# Patient Record
Sex: FEMALE | Race: WHITE | NOT HISPANIC OR LATINO | Employment: OTHER | URBAN - METROPOLITAN AREA
[De-identification: names, ages, dates, MRNs, and addresses within clinical notes are randomized per-mention and may not be internally consistent; named-entity substitution may affect disease eponyms.]

---

## 2017-01-13 ENCOUNTER — TELEPHONE (OUTPATIENT)
Dept: INTERNAL MEDICINE | Facility: CLINIC | Age: 82
End: 2017-01-13

## 2017-01-13 NOTE — TELEPHONE ENCOUNTER
----- Message from Kimberly Avalos sent at 1/13/2017  9:01 AM CST -----  Contact: Anca/daughter   [phone]  Pt daughter states she need to be seen today.  Pt went to the ER on 1/4/2017 and was told if she didn't get better to see her PCP    Please advise

## 2017-01-17 ENCOUNTER — OFFICE VISIT (OUTPATIENT)
Dept: INTERNAL MEDICINE | Facility: CLINIC | Age: 82
End: 2017-01-17
Payer: MEDICARE

## 2017-01-17 VITALS
HEART RATE: 82 BPM | TEMPERATURE: 98 F | BODY MASS INDEX: 27.02 KG/M2 | WEIGHT: 147.69 LBS | SYSTOLIC BLOOD PRESSURE: 168 MMHG | DIASTOLIC BLOOD PRESSURE: 88 MMHG | OXYGEN SATURATION: 97 %

## 2017-01-17 DIAGNOSIS — R51.9 CHRONIC NONINTRACTABLE HEADACHE, UNSPECIFIED HEADACHE TYPE: ICD-10-CM

## 2017-01-17 DIAGNOSIS — G89.29 CHRONIC NONINTRACTABLE HEADACHE, UNSPECIFIED HEADACHE TYPE: ICD-10-CM

## 2017-01-17 DIAGNOSIS — F41.0 PANIC DISORDER: Primary | ICD-10-CM

## 2017-01-17 PROCEDURE — 99999 PR PBB SHADOW E&M-EST. PATIENT-LVL IV: CPT | Mod: PBBFAC,,, | Performed by: INTERNAL MEDICINE

## 2017-01-17 PROCEDURE — 99499 UNLISTED E&M SERVICE: CPT | Mod: S$GLB,,, | Performed by: INTERNAL MEDICINE

## 2017-01-17 PROCEDURE — 1159F MED LIST DOCD IN RCRD: CPT | Mod: S$GLB,,, | Performed by: INTERNAL MEDICINE

## 2017-01-17 PROCEDURE — 99213 OFFICE O/P EST LOW 20 MIN: CPT | Mod: S$GLB,,, | Performed by: INTERNAL MEDICINE

## 2017-01-17 PROCEDURE — 1125F AMNT PAIN NOTED PAIN PRSNT: CPT | Mod: S$GLB,,, | Performed by: INTERNAL MEDICINE

## 2017-01-17 PROCEDURE — 1157F ADVNC CARE PLAN IN RCRD: CPT | Mod: S$GLB,,, | Performed by: INTERNAL MEDICINE

## 2017-01-17 PROCEDURE — 3077F SYST BP >= 140 MM HG: CPT | Mod: S$GLB,,, | Performed by: INTERNAL MEDICINE

## 2017-01-17 PROCEDURE — 1160F RVW MEDS BY RX/DR IN RCRD: CPT | Mod: S$GLB,,, | Performed by: INTERNAL MEDICINE

## 2017-01-17 PROCEDURE — 3079F DIAST BP 80-89 MM HG: CPT | Mod: S$GLB,,, | Performed by: INTERNAL MEDICINE

## 2017-01-17 RX ORDER — AMOXICILLIN AND CLAVULANATE POTASSIUM 875; 125 MG/1; MG/1
1 TABLET, FILM COATED ORAL 2 TIMES DAILY
Qty: 14 TABLET | Refills: 0 | Status: SHIPPED | OUTPATIENT
Start: 2017-01-17 | End: 2017-01-24

## 2017-01-17 RX ORDER — FLUTICASONE PROPIONATE 50 MCG
2 SPRAY, SUSPENSION (ML) NASAL DAILY PRN
Qty: 16 G | Refills: 12 | Status: SHIPPED | OUTPATIENT
Start: 2017-01-17 | End: 2017-02-16

## 2017-01-17 NOTE — MR AVS SNAPSHOT
[facility] Internal Medicine   [place] [address]  Phone: [phone]  Fax: [phone]                  Katherine Hernandez   2017 4:20 PM   Office Visit    Description:  Female : 1931   Provider:  Wilder Martinez MD   Department:  [facility] Medicine           Reason for Visit     Sinusitis     Headache                To Do List           Goals (5 Years of Data)     None       These Medications        Disp Refills Start End    fluticasone (FLONASE) 50 mcg/actuation nasal spray 16 g 12 2017    2 sprays by Each Nare route daily as needed (Use as directed for sinus pressure). - Each Nare    Pharmacy: [facility] [address] Ph #: [phone]       amoxicillin-clavulanate 875-125mg (AUGMENTIN) 875-125 mg per tablet 14 tablet 0 2017    Take 1 tablet by mouth 2 (two) times daily. - Oral    Pharmacy: [facility] [address] Ph #: [phone]         [facility]     [facility] Nurse [facility] -  Assistance  Registered nurses in the [facility] provide clinical advisement, health education, appointment booking, and other advisory services.  Call for this free service at [phone].             Medications           Message regarding Medications     Verify the changes and/or additions to your medication regime listed below are the same as discussed with your clinician today.  If any of these changes or additions are incorrect, please notify your healthcare provider.        START taking these NEW medications        Refills    fluticasone (FLONASE) 50 mcg/actuation nasal spray 12    Si sprays by Each Nare route daily as needed (Use as directed for sinus pressure).    Class: Print    Route: Each Nare    amoxicillin-clavulanate 875-125mg (AUGMENTIN) 875-125 mg per tablet 0    Sig: Take 1 tablet by mouth 2 (two) times daily.    Class: Print    Route:  Oral           Verify that the below list of medications is an accurate representation of the medications you are currently taking.  If none reported, the list may be blank. If incorrect, please contact your healthcare provider. Carry this list with you in case of emergency.           Current Medications     alprazolam (XANAX) 0.5 MG tablet Take 1 tablet (0.5 mg total) by mouth daily as needed for Anxiety (caution on sedative effects).    amlodipine (NORVASC) 10 MG tablet Take 5 mg by mouth once daily.     apixaban (ELIQUIS) 2.5 mg Tab Take 2.5 mg by mouth 2 (two) times daily.    CA CARB/D3/MAG OX//ANNA/ZN (CALTRATE + D3 PLUS MINERALS ORAL) Take by mouth.    furosemide (LASIX) 20 MG tablet Take 20 mg by mouth 2 (two) times daily.    gemfibrozil (LOPID) 600 MG tablet Take 600 mg by mouth. Take 1/2 tablet QHS    losartan (COZAAR) 50 MG tablet Take 50 mg by mouth 2 (two) times daily.    metoprolol tartrate (LOPRESSOR) 25 MG tablet Take 50 mg by mouth 2 (two) times daily.     omeprazole (PRILOSEC) 20 MG capsule Take 20 mg by mouth once daily.    oxycodone-acetaminophen (PERCOCET) 5-325 mg per tablet Take 1 tablet by mouth every 6 (six) hours as needed for Pain.    polycarbophil (FIBERCON) 625 mg tablet Take 625 mg by mouth once daily.    promethazine (PHENERGAN) 25 MG tablet Take 1 tablet (25 mg total) by mouth 3 (three) times daily as needed for Nausea.    rosuvastatin (CRESTOR) 10 MG tablet Take 10 mg by mouth every evening.    senna-docusate 8.6-50 mg (SENNA WITH DOCUSATE SODIUM) 8.6-50 mg per tablet Take 1 tablet by mouth once daily.    vitamins  A,C,E-zinc-copper (PRESERVISION AREDS) 7,160-113-100 unit-mg-unit Tab Take by mouth.    amoxicillin-clavulanate 875-125mg (AUGMENTIN) 875-125 mg per tablet Take 1 tablet by mouth 2 (two) times daily.    fluticasone (FLONASE) 50 mcg/actuation nasal spray 2 sprays by Each Nare route daily as needed (Use as directed for sinus pressure).           Clinical Reference  "Information           Vital Signs - Last Recorded  Most recent update: 1/17/2017  4:46 PM by Shyanne Crow LPN    BP Pulse Temp Wt SpO2 BMI    (!) 168/88 (BP Location: Right arm, Patient Position: Sitting, BP Method: Manual) 82 97.6 °F (36.4 °C) (Oral) 67 kg (147 lb 11.3 oz) 97% 27.02 kg/m2      Blood Pressure          Most Recent Value    BP  (!)  168/88      Allergies as of 1/17/2017     Ace Inhibitors    Diovan Hct [Valsartan-hydrochlorothiazide]    Fosinopril Sodium    Codeine    Niaspan Extended-release [Niacin]      Immunizations Administered on Date of Encounter - 1/17/2017     None      [facility] Sign-Up     Activating your [facility] account is as easy as 1-2-3!     1) Visit [email or web address], select Sign Up Now, enter this activation code and your date of birth, then select Next.  [email or web address]  Expires: 2/11/2017  8:41 AM      2) Create a username and password to use when you visit [facility] in the future and select a security question in case you lose your password and select Next.    3) Enter your e-mail address and click Sign Up!    Additional Information  If you have questions, please e-mail [email or web address] or call [phone] to talk to our [facility] staff. Remember, [facility] is NOT to be used for urgent needs. For medical emergencies, dial 911.         Instructions    Plan for the sinuses  · FIRST THING is to start with Flonase nasal spray. After doing this for about 7 days, if pressure has not gotten better, you can add a new antibiotic in addition to the Flonase.   · New antibiotic is Augmentin.       How to use your Flonase   - flonase is not a very good "as needed medicine" for nasal congestion  - it works best when you use it for a period of at least 7 days in A ROW.   - I want you to take this for 7 to 14 days in a row then take a 1 week break.   - After 1 week break, if congestion returns, you can start for another 14 days.   - Always take a 7 day break between uses so you don't " get side effects like nose bleed or sore throat.   - Gargle after each use of Flonase and spit out the water       Decongestants   - do not use daily, this can make your congestion worse over time   - for rapid relief of allergy congestion you can try something like Claritin-D with a decongestant. Afrin. Psuedophed. Phenylerphrine   - this is for rapid relief and should only be used as needed, less than 4 days per week.     NASAL SALINE   Washing the nasal cavities with saline reduces postnasal drainage, removes secretions, and rinses away allergens and irritants.     Saline washes can be used immediately prior to administration of other intranasal medications so that the mucosa is freshly cleansed when the medications are introduced    You can use this daily, or multiple times daily, depending on the severity of symptoms      Reasons to Call Clinic   · Start to have headache, facial pain, yellow mucous, fevers.

## 2017-01-17 NOTE — PATIENT INSTRUCTIONS
"Plan for the sinuses  · FIRST THING is to start with Flonase nasal spray. After doing this for about 7 days, if pressure has not gotten better, you can add a new antibiotic in addition to the Flonase.   · New antibiotic is Augmentin.       How to use your Flonase   - flonase is not a very good "as needed medicine" for nasal congestion  - it works best when you use it for a period of at least 7 days in A ROW.   - I want you to take this for 7 to 14 days in a row then take a 1 week break.   - After 1 week break, if congestion returns, you can start for another 14 days.   - Always take a 7 day break between uses so you don't get side effects like nose bleed or sore throat.   - Gargle after each use of Flonase and spit out the water       Decongestants   - do not use daily, this can make your congestion worse over time   - for rapid relief of allergy congestion you can try something like Claritin-D with a decongestant. Afrin. Psuedophed. Phenylerphrine   - this is for rapid relief and should only be used as needed, less than 4 days per week.     NASAL SALINE   Washing the nasal cavities with saline reduces postnasal drainage, removes secretions, and rinses away allergens and irritants.     Saline washes can be used immediately prior to administration of other intranasal medications so that the mucosa is freshly cleansed when the medications are introduced    You can use this daily, or multiple times daily, depending on the severity of symptoms      Reasons to Call Clinic   · Start to have headache, facial pain, yellow mucous, fevers.       "

## 2017-01-18 NOTE — PROGRESS NOTES
"Portions of this note are generated with voice recognition software. Typographical errors may exist.     SUBJECTIVE:    This is a/an 85 y.o. female here for primary care visit for  Chief Complaint   Patient presents with    Sinusitis    Headache     Sinus headache.  Patient states after completing doxycycline she had modest improvement in the symptoms but states that it has not resolved.  Patient denies constitutional symptoms.  Patient struggles to describe her symptoms in detail.  Patient becomes exasperated and states "I don't know how I feel."  Denies otalgia.  Denies any significant diarrheal symptoms after anti-biotic.  Self-care has been limited.  No use of intranasal steroids.  Episodic use of over-the-counter cough and sinus medication.  She denies significant cough.    Anxiousness.  Patient endorses panic attack symptoms relating to unresolved sinus congestion.  Patient states that she has alprazolam at home for management of her anxiety attacks.    Medications Reviewed and Updated    Past medical, family, and social histories were reviewed and updated.    Review of Systems negative unless noted otherwise in history of present illness-  General ROS: negative  Psychological ROS: negative  ENT ROS: negative  Allergy and Immunology ROS: negative  Hematological and Lymphatic ROS: negative  Respiratory ROS: negative    Allergic:    Review of patient's allergies indicates:   Allergen Reactions    Ace inhibitors     Diovan hct [valsartan-hydrochlorothiazide] Swelling    Fosinopril sodium Swelling    Codeine Rash    Niaspan extended-release [niacin] Rash       OBJECTIVE:  BP: (!) 168/88 Pulse: 82 Temp: 97.6 °F (36.4 °C)  Wt Readings from Last 3 Encounters:   01/17/17 67 kg (147 lb 11.3 oz)   01/04/17 54.4 kg (120 lb)   12/28/16 73 kg (161 lb)    Body mass index is 27.02 kg/(m^2).  Previous Blood Pressure Readings :   BP Readings from Last 3 Encounters:   01/17/17 (!) 168/88   01/04/17 (!) 150/83   12/28/16 " (!) 156/77     GEN: No apparent distress  HEENT: sclera non-icteric, conjunctiva clear, moderate sinus pressure on palpation bilateral maxillary sinuses.  Equal on both sides.  No significant edema of the nasal turbinates.  No polyposis.  CV: no peripheral edema  PULM: breathing non-labored no wheezing bilateral lung fields.  ABD: Obese, protuberant abdomen.  PSYCH: Moderate psychomotor agitation.  MSK: able to rise from chair without assistance  SKIN: normal skin turgor    Pertinent Labs Reviewed       ASSESSMENT/PLAN:    Chronic headache.  Clinical follow-up warranted.  Etiology likely multifactorial.  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  Chronic tension headache due to anxiety disorder also possible.  Recommend patient pursue a nasal corticosteroid.  Patient can pursue Augmentin therapy for augmentation as necessary.  If this fails the patient likely needs follow-up with PCP to broaden treatment approach.    Panic disorder.  Clinical follow-up warranted.  Recommend patient continue to use current medication.    No future appointments.    Wilder Martinez  1/17/2017  7:08 PM

## 2017-01-27 ENCOUNTER — OFFICE VISIT (OUTPATIENT)
Dept: INTERNAL MEDICINE | Facility: CLINIC | Age: 82
End: 2017-01-27
Payer: MEDICARE

## 2017-01-27 VITALS
HEIGHT: 62 IN | HEART RATE: 63 BPM | BODY MASS INDEX: 28.11 KG/M2 | SYSTOLIC BLOOD PRESSURE: 148 MMHG | WEIGHT: 152.75 LBS | DIASTOLIC BLOOD PRESSURE: 68 MMHG

## 2017-01-27 DIAGNOSIS — F32.A DEPRESSION, UNSPECIFIED DEPRESSION TYPE: ICD-10-CM

## 2017-01-27 DIAGNOSIS — J30.9 ALLERGIC RHINITIS, UNSPECIFIED ALLERGIC RHINITIS TRIGGER, UNSPECIFIED RHINITIS SEASONALITY: ICD-10-CM

## 2017-01-27 DIAGNOSIS — I50.42 CHRONIC COMBINED SYSTOLIC AND DIASTOLIC CONGESTIVE HEART FAILURE: ICD-10-CM

## 2017-01-27 DIAGNOSIS — I48.20 CHRONIC ATRIAL FIBRILLATION: ICD-10-CM

## 2017-01-27 DIAGNOSIS — I10 ESSENTIAL HYPERTENSION: Primary | ICD-10-CM

## 2017-01-27 DIAGNOSIS — K21.9 GASTROESOPHAGEAL REFLUX DISEASE, ESOPHAGITIS PRESENCE NOT SPECIFIED: ICD-10-CM

## 2017-01-27 DIAGNOSIS — F41.9 ANXIETY: ICD-10-CM

## 2017-01-27 PROCEDURE — 1159F MED LIST DOCD IN RCRD: CPT | Mod: S$GLB,,, | Performed by: INTERNAL MEDICINE

## 2017-01-27 PROCEDURE — 3077F SYST BP >= 140 MM HG: CPT | Mod: S$GLB,,, | Performed by: INTERNAL MEDICINE

## 2017-01-27 PROCEDURE — 1157F ADVNC CARE PLAN IN RCRD: CPT | Mod: S$GLB,,, | Performed by: INTERNAL MEDICINE

## 2017-01-27 PROCEDURE — 99214 OFFICE O/P EST MOD 30 MIN: CPT | Mod: S$GLB,,, | Performed by: INTERNAL MEDICINE

## 2017-01-27 PROCEDURE — 99999 PR PBB SHADOW E&M-EST. PATIENT-LVL III: CPT | Mod: PBBFAC,,, | Performed by: INTERNAL MEDICINE

## 2017-01-27 PROCEDURE — 3078F DIAST BP <80 MM HG: CPT | Mod: S$GLB,,, | Performed by: INTERNAL MEDICINE

## 2017-01-27 PROCEDURE — 99499 UNLISTED E&M SERVICE: CPT | Mod: S$GLB,,, | Performed by: INTERNAL MEDICINE

## 2017-01-27 PROCEDURE — 1126F AMNT PAIN NOTED NONE PRSNT: CPT | Mod: S$GLB,,, | Performed by: INTERNAL MEDICINE

## 2017-01-27 PROCEDURE — 1160F RVW MEDS BY RX/DR IN RCRD: CPT | Mod: S$GLB,,, | Performed by: INTERNAL MEDICINE

## 2017-01-27 RX ORDER — SERTRALINE HYDROCHLORIDE 50 MG/1
50 TABLET, FILM COATED ORAL DAILY
Qty: 30 TABLET | Refills: 1 | Status: SHIPPED | OUTPATIENT
Start: 2017-01-27 | End: 2017-03-03

## 2017-01-27 NOTE — PROGRESS NOTES
Subjective:       Patient ID: Katherine Hernandez is a 85 y.o. female.    Chief Complaint: Sinusitis and Gastroesophageal Reflux    HPI Pt. Here for f/u for allergies; pt. Daughter, Anca, is with the pt.; She has been using flonase and her symptoms are improving; I advised her to try OTC claritin prn; she reports occassional GERD and her daughter gave her prilosec this AM which helped; she is on xanax for anxiety and she feels she needs something for depression; she denies suicidal ideation; BP is borderline elevated; she sees cardiology for A-fib; she would prefer to have cardiology manage BP and does not want adjustment in BP meds at this time; she gained a few pounds  Review of Systems   Constitutional: Negative for chills, fatigue and fever.   HENT: Positive for postnasal drip and rhinorrhea. Negative for congestion and sore throat.         Flonase prn helps   Respiratory: Negative for cough, shortness of breath and wheezing.    Cardiovascular: Negative for chest pain.   Gastrointestinal: Negative for abdominal pain, nausea and vomiting.        GERD helped with prilosec prn   Genitourinary: Negative for dysuria.   Musculoskeletal: Negative for arthralgias.   Skin: Negative for rash.   Neurological: Negative for dizziness and headaches.   Psychiatric/Behavioral: Negative for sleep disturbance. The patient is nervous/anxious.         Anxiety and depression without suicidal ideation       Objective:      Physical Exam   Constitutional: She is oriented to person, place, and time.   overweight   Eyes: EOM are normal.   Neck: Normal range of motion.   Cardiovascular: Normal rate, regular rhythm and normal heart sounds.    Pulmonary/Chest: Effort normal and breath sounds normal.   Abdominal: Soft. There is no tenderness. There is no rebound and no guarding.   Musculoskeletal: Normal range of motion.   Neurological: She is alert and oriented to person, place, and time.   Skin: No rash noted.       Assessment:       1.  Essential hypertension Well controlled   2. Gastroesophageal reflux disease, esophagitis presence not specified Well controlled   3. Anxiety Active   4. Depression, unspecified depression type Active   5. Chronic atrial fibrillation Well controlled   6. Chronic combined systolic and diastolic congestive heart failure Well controlled   7. Allergic rhinitis, unspecified allergic rhinitis trigger, unspecified rhinitis seasonality Well controlled       Plan:         Essential hypertension  Comments:  based on age goal SBP <150; encouraged low Na diet and weight loss    Gastroesophageal reflux disease, esophagitis presence not specified  Comments:  continue prilosec prn     Anxiety  Comments:  continue xanax prn and cautioned on sedative effects; monitor on zoloft  Orders:  -     sertraline (ZOLOFT) 50 MG tablet; Take 1 tablet (50 mg total) by mouth once daily.  Dispense: 30 tablet; Refill: 1    Depression, unspecified depression type  Comments:  start zoloft and re-evaluate in 1 month  Orders:  -     sertraline (ZOLOFT) 50 MG tablet; Take 1 tablet (50 mg total) by mouth once daily.  Dispense: 30 tablet; Refill: 1    Chronic atrial fibrillation  Comments:  continue current regimen and f/u cardiology who is managing    Chronic combined systolic and diastolic congestive heart failure  Comments:  continue current regimen and f/u cardiology who is managing    Allergic rhinitis, unspecified allergic rhinitis trigger, unspecified rhinitis seasonality  Comments:  continue flonase prn and asked pt. to try claritin prn

## 2017-01-27 NOTE — MR AVS SNAPSHOT
[facility] Internal Medicine   [place]  [place] [address]  Phone: [phone]  Fax: [phone]                  Katherine Hernandez   2017 10:40 AM   Office Visit    Description:  Female : 1931   Provider:  Jacobo Gaspar MD   Department:  [facility] Medicine           Reason for Visit     Sinusitis     Gastroesophageal Reflux           Diagnoses this Visit        Comments    Essential hypertension    -  Primary based on age goal SBP <150; encouraged low Na diet and weight loss    Gastroesophageal reflux disease, esophagitis presence not specified     continue prilosec prn     Anxiety     continue xanax prn and cautioned on sedative effects; monitor on zoloft    Depression, unspecified depression type     start zoloft and re-evaluate in 1 month    Chronic atrial fibrillation     continue current regimen and f/u cardiology who is managing    Chronic combined systolic and diastolic congestive heart failure     continue current regimen and f/u cardiology who is managing    Allergic rhinitis, unspecified allergic rhinitis trigger, unspecified rhinitis seasonality     continue flonase prn and asked pt. to try claritin prn            To Do List           Future Appointments        Provider Department Dept Phone    3/3/2017 10:20 AM Jacobo Gaspar MD [facility] Medicine [phone]      Goals (5 Years of Data)     None      Follow-Up and Disposition     Return in about 1 month (around 2017).    Follow-up and Disposition History       These Medications        Disp Refills Start End    sertraline (ZOLOFT) 50 MG tablet 30 tablet 1 2017    Take 1 tablet (50 mg total) by mouth once daily. - Oral    Pharmacy: [place] [address] [address] Ph #: [phone]         [facility] On Call     [facility] On Call Nurse Care Line -  Assistance  Registered nurses in the [facility] provide clinical advisement, health education,  appointment booking, and other advisory services.  Call for this free service at [phone].             Medications           Message regarding Medications     Verify the changes and/or additions to your medication regime listed below are the same as discussed with your clinician today.  If any of these changes or additions are incorrect, please notify your healthcare provider.        START taking these NEW medications        Refills    sertraline (ZOLOFT) 50 MG tablet 1    Sig: Take 1 tablet (50 mg total) by mouth once daily.    Class: Normal    Route: Oral           Verify that the below list of medications is an accurate representation of the medications you are currently taking.  If none reported, the list may be blank. If incorrect, please contact your healthcare provider. Carry this list with you in case of emergency.           Current Medications     alprazolam (XANAX) 0.5 MG tablet Take 1 tablet (0.5 mg total) by mouth daily as needed for Anxiety (caution on sedative effects).    amlodipine (NORVASC) 10 MG tablet Take 5 mg by mouth once daily.     apixaban (ELIQUIS) 2.5 mg Tab Take 2.5 mg by mouth 2 (two) times daily.    CA CARB/D3/MAG OX//ANNA/ZN (CALTRATE + D3 PLUS MINERALS ORAL) Take by mouth.    fluticasone (FLONASE) 50 mcg/actuation nasal spray 2 sprays by Each Nare route daily as needed (Use as directed for sinus pressure).    furosemide (LASIX) 20 MG tablet Take 20 mg by mouth 2 (two) times daily.    gemfibrozil (LOPID) 600 MG tablet Take 600 mg by mouth. Take 1/2 tablet QHS    losartan (COZAAR) 50 MG tablet Take 50 mg by mouth 2 (two) times daily.    metoprolol tartrate (LOPRESSOR) 25 MG tablet Take 50 mg by mouth 2 (two) times daily.     omeprazole (PRILOSEC) 20 MG capsule Take 20 mg by mouth once daily.    oxycodone-acetaminophen (PERCOCET) 5-325 mg per tablet Take 1 tablet by mouth every 6 (six) hours as needed for Pain.    polycarbophil (FIBERCON) 625 mg tablet Take 625 mg by mouth once  "daily.    promethazine (PHENERGAN) 25 MG tablet Take 1 tablet (25 mg total) by mouth 3 (three) times daily as needed for Nausea.    rosuvastatin (CRESTOR) 10 MG tablet Take 10 mg by mouth every evening.    senna-docusate 8.6-50 mg (SENNA WITH DOCUSATE SODIUM) 8.6-50 mg per tablet Take 1 tablet by mouth once daily.    vitamins  A,C,E-zinc-copper (PRESERVISION AREDS) 7,160-113-100 unit-mg-unit Tab Take by mouth.    sertraline (ZOLOFT) 50 MG tablet Take 1 tablet (50 mg total) by mouth once daily.           Clinical Reference Information           Vital Signs - Last Recorded  Most recent update: 1/27/2017 11:25 AM by Jacobo Gaspar MD    BP Pulse Ht Wt BMI    (!) 148/68 63 5' 2" (1.575 m) 69.3 kg (152 lb 12.5 oz) 27.94 kg/m2      Blood Pressure          Most Recent Value    BP  (!)  148/68      Allergies as of 1/27/2017     Ace Inhibitors    Diovan Hct [Valsartan-hydrochlorothiazide]    Fosinopril Sodium    Codeine    Niaspan Extended-release [Niacin]      Immunizations Administered on Date of Encounter - 1/27/2017     None      [facility] Sign-Up     Activating your [facility] account is as easy as 1-2-3!     1) Visit [email or web address], select Sign Up Now, enter this activation code and your date of birth, then select Next.  [email or web address]  Expires: 2/11/2017  8:41 AM      2) Create a username and password to use when you visit [facility] in the future and select a security question in case you lose your password and select Next.    3) Enter your e-mail address and click Sign Up!    Additional Information  If you have questions, please e-mail [email or web address] or call [phone] to talk to our [facility] staff. Remember, [facility] is NOT to be used for urgent needs. For medical emergencies, dial 911.         "

## 2017-03-03 ENCOUNTER — OFFICE VISIT (OUTPATIENT)
Dept: INTERNAL MEDICINE | Facility: CLINIC | Age: 82
End: 2017-03-03
Payer: MEDICARE

## 2017-03-03 VITALS
HEIGHT: 62 IN | BODY MASS INDEX: 28.61 KG/M2 | HEART RATE: 65 BPM | SYSTOLIC BLOOD PRESSURE: 150 MMHG | DIASTOLIC BLOOD PRESSURE: 66 MMHG | WEIGHT: 155.44 LBS

## 2017-03-03 DIAGNOSIS — I10 ESSENTIAL HYPERTENSION: Primary | ICD-10-CM

## 2017-03-03 DIAGNOSIS — F41.9 ANXIETY: ICD-10-CM

## 2017-03-03 DIAGNOSIS — F32.A DEPRESSION, UNSPECIFIED DEPRESSION TYPE: ICD-10-CM

## 2017-03-03 DIAGNOSIS — I48.20 CHRONIC ATRIAL FIBRILLATION: ICD-10-CM

## 2017-03-03 DIAGNOSIS — E66.3 OVERWEIGHT (BMI 25.0-29.9): ICD-10-CM

## 2017-03-03 PROCEDURE — 3077F SYST BP >= 140 MM HG: CPT | Mod: S$GLB,,, | Performed by: INTERNAL MEDICINE

## 2017-03-03 PROCEDURE — 99214 OFFICE O/P EST MOD 30 MIN: CPT | Mod: S$GLB,,, | Performed by: INTERNAL MEDICINE

## 2017-03-03 PROCEDURE — 1159F MED LIST DOCD IN RCRD: CPT | Mod: S$GLB,,, | Performed by: INTERNAL MEDICINE

## 2017-03-03 PROCEDURE — 1126F AMNT PAIN NOTED NONE PRSNT: CPT | Mod: S$GLB,,, | Performed by: INTERNAL MEDICINE

## 2017-03-03 PROCEDURE — 3078F DIAST BP <80 MM HG: CPT | Mod: S$GLB,,, | Performed by: INTERNAL MEDICINE

## 2017-03-03 PROCEDURE — 1157F ADVNC CARE PLAN IN RCRD: CPT | Mod: S$GLB,,, | Performed by: INTERNAL MEDICINE

## 2017-03-03 PROCEDURE — 1160F RVW MEDS BY RX/DR IN RCRD: CPT | Mod: S$GLB,,, | Performed by: INTERNAL MEDICINE

## 2017-03-03 PROCEDURE — 99999 PR PBB SHADOW E&M-EST. PATIENT-LVL III: CPT | Mod: PBBFAC,,, | Performed by: INTERNAL MEDICINE

## 2017-03-03 PROCEDURE — 99499 UNLISTED E&M SERVICE: CPT | Mod: S$GLB,,, | Performed by: INTERNAL MEDICINE

## 2017-03-03 RX ORDER — VENLAFAXINE HYDROCHLORIDE 37.5 MG/1
37.5 CAPSULE, EXTENDED RELEASE ORAL DAILY
Qty: 30 CAPSULE | Refills: 1 | Status: SHIPPED | OUTPATIENT
Start: 2017-03-03 | End: 2017-04-19 | Stop reason: SDUPTHER

## 2017-03-03 NOTE — PROGRESS NOTES
Subjective:       Patient ID: Katherine Hernandez is a 85 y.o. female.    Chief Complaint: Follow-up    HPI Pt. Here for f/u for HTN; anxiety and depression; her daughter, Anca, is with the pt.; she could not tolerate zoloft and would like alternative; BP is elevated but she states she does not want me to adjust BP med and prefer cardiology to adjust; I explained risks of non-compliance; pt. Has gained a few pounds; she also f/u cardiology last week and is on Eliquis; she would prefer cardiology draw labs and does not want me to order; she will get reports for me; pt. Does not have smart phone for HTN digital program   Review of Systems   Constitutional: Negative for chills, fatigue and fever.   HENT: Negative for congestion, rhinorrhea and sore throat.    Respiratory: Negative for cough, shortness of breath and wheezing.    Cardiovascular: Negative for chest pain.   Gastrointestinal: Negative for abdominal pain, nausea and vomiting.   Genitourinary: Negative for dysuria.   Musculoskeletal: Negative for arthralgias.   Skin: Negative for rash.   Neurological: Negative for dizziness and headaches.   Psychiatric/Behavioral: Negative for sleep disturbance. The patient is nervous/anxious.         Anxiety and depression: could not tolerate zoloft and would like alternative       Objective:      Physical Exam   Constitutional: She is oriented to person, place, and time.   overweight   Eyes: EOM are normal.   Neck: Normal range of motion.   Cardiovascular: Normal rate, regular rhythm and normal heart sounds.    Pulmonary/Chest: Effort normal and breath sounds normal.   Abdominal: Soft. There is no tenderness. There is no rebound and no guarding.   Musculoskeletal: Normal range of motion.   Neurological: She is alert and oriented to person, place, and time.   Skin: No rash noted.       Assessment:       1. Essential hypertension Sub-optimally controlled   2. Anxiety Sub-optimally controlled   3. Depression, unspecified  depression type Sub-optimally controlled   4. Chronic atrial fibrillation Well controlled   5. Overweight (BMI 25.0-29.9) Sub-optimally controlled       Plan:         Essential hypertension  Comments:  pt. would prefer cardiology manage; encouraged low Na diet and weight loss; continue current regimen     Anxiety  Comments:  D/C zoloft and start effexor and re-evaluate in 1 month   Orders:  -     venlafaxine (EFFEXOR-XR) 37.5 MG 24 hr capsule; Take 1 capsule (37.5 mg total) by mouth once daily.  Dispense: 30 capsule; Refill: 1    Depression, unspecified depression type  Comments:  start effexor   Orders:  -     venlafaxine (EFFEXOR-XR) 37.5 MG 24 hr capsule; Take 1 capsule (37.5 mg total) by mouth once daily.  Dispense: 30 capsule; Refill: 1    Chronic atrial fibrillation  Comments:  continue current regimen and f/u cardiology who is managing    Overweight (BMI 25.0-29.9)  Comments:  encouraged diet

## 2017-03-03 NOTE — MR AVS SNAPSHOT
[facility]   [place]  [place] [address]  Phone: [phone]  Fax: [phone]                  Katherine Hernandez   3/3/2017 10:20 AM   Office Visit    Description:  Female : 1931   Provider:  Jacobo Gaspar MD   Department:  [facility]           Reason for Visit     Follow-up           Diagnoses this Visit        Comments    Essential hypertension    -  Primary pt. would prefer cardiology manage; encouraged low Na diet and weight loss; continue current regimen     Anxiety     D/C zoloft and start effexor and re-evaluate in 1 month     Depression, unspecified depression type     start effexor     Chronic atrial fibrillation     continue current regimen and f/u cardiology who is managing    Overweight (BMI 25.0-29.9)     encouraged diet            To Do List           Future Appointments        Provider Department Dept Phone    2017 1:20 PM Jacobo Gaspar MD [facility] [phone]      Goals (5 Years of Data)     None      Follow-Up and Disposition     Return in about 1 month (around 4/3/2017).    Follow-up and Disposition History       These Medications        Disp Refills Start End    venlafaxine (EFFEXOR-XR) 37.5 MG 24 hr capsule 30 capsule 1 3/3/2017 3/3/2018    Take 1 capsule (37.5 mg total) by mouth once daily. - Oral    Pharmacy: [facility] [place] [address] Ph #: [phone]         [facility] On Call     [facility] Nurse Care Line -  Assistance  Registered nurses in the [facility] provide clinical advisement, health education, appointment booking, and other advisory services.  Call for this free service at [phone].             Medications           Message regarding Medications     Verify the changes and/or additions to your medication regime listed below are the same as discussed with your clinician today.  If any of these changes or additions are incorrect, please notify  your healthcare provider.        START taking these NEW medications        Refills    venlafaxine (EFFEXOR-XR) 37.5 MG 24 hr capsule 1    Sig: Take 1 capsule (37.5 mg total) by mouth once daily.    Class: Normal    Route: Oral      STOP taking these medications     sertraline (ZOLOFT) 50 MG tablet Take 1 tablet (50 mg total) by mouth once daily.           Verify that the below list of medications is an accurate representation of the medications you are currently taking.  If none reported, the list may be blank. If incorrect, please contact your healthcare provider. Carry this list with you in case of emergency.           Current Medications     alprazolam (XANAX) 0.5 MG tablet Take 1 tablet (0.5 mg total) by mouth daily as needed for Anxiety (caution on sedative effects).    amlodipine (NORVASC) 10 MG tablet Take 5 mg by mouth once daily.     apixaban (ELIQUIS) 2.5 mg Tab Take 2.5 mg by mouth 2 (two) times daily.    CA CARB/D3/MAG OX//ANNA/ZN (CALTRATE + D3 PLUS MINERALS ORAL) Take by mouth.    furosemide (LASIX) 20 MG tablet Take 20 mg by mouth 2 (two) times daily.    gemfibrozil (LOPID) 600 MG tablet Take 600 mg by mouth. Take 1/2 tablet QHS    losartan (COZAAR) 50 MG tablet Take 50 mg by mouth 2 (two) times daily.    metoprolol tartrate (LOPRESSOR) 25 MG tablet Take 50 mg by mouth 2 (two) times daily.     omeprazole (PRILOSEC) 20 MG capsule Take 20 mg by mouth once daily.    oxycodone-acetaminophen (PERCOCET) 5-325 mg per tablet Take 1 tablet by mouth every 6 (six) hours as needed for Pain.    polycarbophil (FIBERCON) 625 mg tablet Take 625 mg by mouth once daily.    promethazine (PHENERGAN) 25 MG tablet Take 1 tablet (25 mg total) by mouth 3 (three) times daily as needed for Nausea.    rosuvastatin (CRESTOR) 10 MG tablet Take 10 mg by mouth every evening.    senna-docusate 8.6-50 mg (SENNA WITH DOCUSATE SODIUM) 8.6-50 mg per tablet Take 1 tablet by mouth once daily.    vitamins  A,C,E-zinc-copper  "(PRESERVISION AREDS) 7,160-113-100 unit-mg-unit Tab Take by mouth.    venlafaxine (EFFEXOR-XR) 37.5 MG 24 hr capsule Take 1 capsule (37.5 mg total) by mouth once daily.           Clinical Reference Information           Your Vitals Were     BP Pulse Height Weight BMI    150/66 (BP Location: Left arm, Patient Position: Sitting, BP Method: Manual) 65 5' 2" (1.575 m) 70.5 kg (155 lb 6.8 oz) 28.43 kg/m2      Blood Pressure          Most Recent Value    BP  (!)  150/66      Allergies as of 3/3/2017     Ace Inhibitors    Diovan Hct [Valsartan-hydrochlorothiazide]    Fosinopril Sodium    Codeine    Niaspan Extended-release [Niacin]      Immunizations Administered on Date of Encounter - 3/3/2017     None      [facility] Sign-Up     Activating your [facility] account is as easy as 1-2-3!     1) Visit [email or web address], select Sign Up Now, enter this activation code and your date of birth, then select Next.  [email or web address]  Expires: 4/17/2017 11:18 AM      2) Create a username and password to use when you visit [facility] in the future and select a security question in case you lose your password and select Next.    3) Enter your e-mail address and click Sign Up!    Additional Information  If you have questions, please e-mail [email or web address] or call [phone] to talk to our [facility] staff. Remember, [facility] is NOT to be used for urgent needs. For medical emergencies, dial 911.         Language Assistance Services     ATTENTION: Language assistance services are available, free of charge. Please call [phone].      ATENCIÓN: Si habla darcy, tiene a domingo disposición servicios gratuitos de asistencia lingüística. Llame al [phone].     CHÚ Ý: N?u b?n nói Ti?ng Vi?t, có các d?ch v? h? tr? ngôn ng? mi?n phí dành cho b?n. G?i s? [phone].         [facility] complies with applicable Federal civil rights laws and does not discriminate on the basis of race, color, national origin, age, " disability, or sex.

## 2017-04-19 ENCOUNTER — OFFICE VISIT (OUTPATIENT)
Dept: INTERNAL MEDICINE | Facility: CLINIC | Age: 82
End: 2017-04-19
Payer: MEDICARE

## 2017-04-19 VITALS
HEART RATE: 61 BPM | HEIGHT: 62 IN | BODY MASS INDEX: 28.72 KG/M2 | SYSTOLIC BLOOD PRESSURE: 140 MMHG | DIASTOLIC BLOOD PRESSURE: 66 MMHG | WEIGHT: 156.06 LBS

## 2017-04-19 DIAGNOSIS — F32.A DEPRESSION, UNSPECIFIED DEPRESSION TYPE: ICD-10-CM

## 2017-04-19 DIAGNOSIS — I10 ESSENTIAL HYPERTENSION: Primary | ICD-10-CM

## 2017-04-19 DIAGNOSIS — Z23 NEED FOR SHINGLES VACCINE: ICD-10-CM

## 2017-04-19 DIAGNOSIS — E66.3 OVERWEIGHT (BMI 25.0-29.9): ICD-10-CM

## 2017-04-19 DIAGNOSIS — Z00.00 PREVENTATIVE HEALTH CARE: ICD-10-CM

## 2017-04-19 DIAGNOSIS — F41.9 ANXIETY: ICD-10-CM

## 2017-04-19 DIAGNOSIS — E78.5 HYPERLIPIDEMIA, UNSPECIFIED HYPERLIPIDEMIA TYPE: ICD-10-CM

## 2017-04-19 PROCEDURE — 1159F MED LIST DOCD IN RCRD: CPT | Mod: S$GLB,,, | Performed by: INTERNAL MEDICINE

## 2017-04-19 PROCEDURE — 99999 PR PBB SHADOW E&M-EST. PATIENT-LVL III: CPT | Mod: PBBFAC,,, | Performed by: INTERNAL MEDICINE

## 2017-04-19 PROCEDURE — 1160F RVW MEDS BY RX/DR IN RCRD: CPT | Mod: S$GLB,,, | Performed by: INTERNAL MEDICINE

## 2017-04-19 PROCEDURE — 99499 UNLISTED E&M SERVICE: CPT | Mod: S$GLB,,, | Performed by: INTERNAL MEDICINE

## 2017-04-19 PROCEDURE — 3078F DIAST BP <80 MM HG: CPT | Mod: S$GLB,,, | Performed by: INTERNAL MEDICINE

## 2017-04-19 PROCEDURE — 1125F AMNT PAIN NOTED PAIN PRSNT: CPT | Mod: S$GLB,,, | Performed by: INTERNAL MEDICINE

## 2017-04-19 PROCEDURE — 3077F SYST BP >= 140 MM HG: CPT | Mod: S$GLB,,, | Performed by: INTERNAL MEDICINE

## 2017-04-19 PROCEDURE — 99214 OFFICE O/P EST MOD 30 MIN: CPT | Mod: S$GLB,,, | Performed by: INTERNAL MEDICINE

## 2017-04-19 RX ORDER — VENLAFAXINE HYDROCHLORIDE 37.5 MG/1
37.5 CAPSULE, EXTENDED RELEASE ORAL DAILY
Qty: 30 CAPSULE | Refills: 6 | Status: SHIPPED | OUTPATIENT
Start: 2017-04-19 | End: 2017-12-04 | Stop reason: SDUPTHER

## 2017-04-19 NOTE — MR AVS SNAPSHOT
[facility]   [place]  [place] [address]  Phone: [phone]  Fax: [phone]                  Katherine Hernandez   2017 10:40 AM   Office Visit    Description:  Female : 1931   Provider:  Jacobo Gaspar MD   Department:  [facility]           Reason for Visit     Hypertension     Pain     Medication Management           Diagnoses this Visit        Comments    Essential hypertension    -  Primary based on age, pt. at goal for BP    Anxiety     continue effexor    Depression, unspecified depression type     continue effexor    Hyperlipidemia, unspecified hyperlipidemia type     continue current regimen and encouraged diet modification     Overweight (BMI 25.0-29.9)     encouraged diet     Need for shingles vaccine         Preventative health care                To Do List           Future Appointments        Provider Department Dept Phone    10/16/2017 7:00 AM LAB, [place] [facility] [phone]    10/16/2017 7:30 AM SPECIMEN, [place] [facility] [phone]    10/19/2017 9:40 AM Jacobo Gaspar MD [facility] [phone]      Goals (5 Years of Data)     None      Follow-Up and Disposition     Return in about 6 months (around 10/19/2017).    Follow-up and Disposition History       These Medications        Disp Refills Start End    venlafaxine (EFFEXOR-XR) 37.5 MG 24 hr capsule 30 capsule 6 2017    Take 1 capsule (37.5 mg total) by mouth once daily. - Oral    Pharmacy: [facility] [address] Ph #: [phone]       zoster vaccine live, PF, (ZOSTAVAX, PF,) 19,400 unit/0.65 mL injection 1 vial 0 2017    Inject 19,400 Units into the skin once. - Subcutaneous    Pharmacy: [facility] [address] Ph #: [phone]         [facility]     [facility] Care [facility] -  Assistance  Unless otherwise  directed by your provider, please contact [facility], our nurse care line that is available for 24/7 assistance.     Registered nurses in the [facility] provide: appointment scheduling, clinical advisement, health education, and other advisory services.  Call: [phone] (toll free)               Medications           Message regarding Medications     Verify the changes and/or additions to your medication regime listed below are the same as discussed with your clinician today.  If any of these changes or additions are incorrect, please notify your healthcare provider.        START taking these NEW medications        Refills    zoster vaccine live, PF, (ZOSTAVAX, PF,) 19,400 unit/0.65 mL injection 0    Sig: Inject 19,400 Units into the skin once.    Class: Print    Route: Subcutaneous      STOP taking these medications     promethazine (PHENERGAN) 25 MG tablet Take 1 tablet (25 mg total) by mouth 3 (three) times daily as needed for Nausea.           Verify that the below list of medications is an accurate representation of the medications you are currently taking.  If none reported, the list may be blank. If incorrect, please contact your healthcare provider. Carry this list with you in case of emergency.           Current Medications     alprazolam (XANAX) 0.5 MG tablet Take 1 tablet (0.5 mg total) by mouth daily as needed for Anxiety (caution on sedative effects).    amlodipine (NORVASC) 10 MG tablet Take 5 mg by mouth once daily.     apixaban (ELIQUIS) 2.5 mg Tab Take 2.5 mg by mouth 2 (two) times daily.    CA CARB/D3/MAG OX//ANNA/ZN (CALTRATE + D3 PLUS MINERALS ORAL) Take by mouth.    furosemide (LASIX) 20 MG tablet Take 20 mg by mouth 2 (two) times daily.    gemfibrozil (LOPID) 600 MG tablet Take 600 mg by mouth. Take 1/2 tablet QHS    losartan (COZAAR) 50 MG tablet Take 50 mg by mouth 2 (two) times daily.    metoprolol tartrate (LOPRESSOR) 25 MG tablet Take 50 mg by mouth 2 (two) times  "daily.     omeprazole (PRILOSEC) 20 MG capsule Take 20 mg by mouth once daily.    oxycodone-acetaminophen (PERCOCET) 5-325 mg per tablet Take 1 tablet by mouth every 6 (six) hours as needed for Pain.    polycarbophil (FIBERCON) 625 mg tablet Take 625 mg by mouth once daily.    rosuvastatin (CRESTOR) 10 MG tablet Take 10 mg by mouth every evening.    senna-docusate 8.6-50 mg (SENNA WITH DOCUSATE SODIUM) 8.6-50 mg per tablet Take 1 tablet by mouth once daily.    venlafaxine (EFFEXOR-XR) 37.5 MG 24 hr capsule Take 1 capsule (37.5 mg total) by mouth once daily.    vitamins  A,C,E-zinc-copper (PRESERVISION AREDS) 7,160-113-100 unit-mg-unit Tab Take by mouth.    zoster vaccine live, PF, (ZOSTAVAX, PF,) 19,400 unit/0.65 mL injection Inject 19,400 Units into the skin once.           Clinical Reference Information           Your Vitals Were     BP Pulse Height Weight BMI    140/66 (BP Location: Left arm, Patient Position: Sitting, BP Method: Manual) 61 5' 2" (1.575 m) 70.8 kg (156 lb 1.4 oz) 28.55 kg/m2      Blood Pressure          Most Recent Value    BP  (!)  140/66      Allergies as of 4/19/2017     Ace Inhibitors    Diovan Hct [Valsartan-hydrochlorothiazide]    Fosinopril Sodium    Codeine    Niaspan Extended-release [Niacin]      Immunizations Administered on Date of Encounter - 4/19/2017     None      Orders Placed During Today's Visit     Future Labs/Procedures Expected by Expires    CBC auto differential  9/19/2017 12/19/2017    Comprehensive metabolic panel  9/19/2017 12/19/2017    Lipid panel  9/19/2017 12/19/2017    Urinalysis  9/19/2017 12/19/2017      [facility] Sign-Up     Activating your [facility] account is as easy as 1-2-3!     1) Visit [email or web address], select Sign Up Now, enter this activation code and your date of birth, then select Next.  [email or web address]  Expires: 6/3/2017 10:42 AM      2) Create a username and password to use when you visit [facility] in the future and select a security question in case " you lose your password and select Next.    3) Enter your e-mail address and click Sign Up!    Additional Information  If you have questions, please e-mail [email or web address] or call [phone] to talk to our [facility] staff. Remember, [facility] is NOT to be used for urgent needs. For medical emergencies, dial 911.         Language Assistance Services     ATTENTION: Language assistance services are available, free of charge. Please call [phone].      ATENCIÓN: Si habla español, tiene a domingo disposición servicios gratuitos de asistencia lingüística. Llame al [phone].     CHÚ Ý: N?u b?n nói Ti?ng Vi?t, có các d?ch v? h? tr? ngôn ng? mi?n phí dành cho b?n. G?i s? [phone].         [facility] complies with applicable Federal civil rights laws and does not discriminate on the basis of race, color, national origin, age, disability, or sex.

## 2017-04-19 NOTE — PROGRESS NOTES
Subjective:       Patient ID: Katherine Hernandez is a 85 y.o. female.    Chief Complaint: Hypertension; Pain (Back and Knee); and Medication Management (Effexor)    HPI Pt. Here for f/u for HTN, anxiety and depression; pt. Daughter Anca, is with the patient; she states effexor helps anxiety and depression; BP is borderline elevated and based on age, BP meets goals; weight is elevated but stable; she sees pain management for chronic back and B/L knees; she is using percocet and I advised her to f/u pain management for refills; she is compliant with meds; she does not want bone density; she would like shingles vaccine; pt. States she gets labs from cardiology and that is why she does not want labs; she will bring in report  Review of Systems   Constitutional: Negative for chills, fatigue and fever.   HENT: Negative for congestion, rhinorrhea and sore throat.    Respiratory: Negative for cough, shortness of breath and wheezing.    Cardiovascular: Negative for chest pain.   Gastrointestinal: Negative for abdominal pain, nausea and vomiting.   Genitourinary: Negative for dysuria.   Musculoskeletal: Positive for arthralgias and back pain.        R low back pain which is worse with ROM; L > R knee pain especially with ROM   Skin: Negative for rash.   Neurological: Negative for dizziness and headaches.   Psychiatric/Behavioral: Negative for sleep disturbance. The patient is nervous/anxious.         Anxiety and depression well controlled with effexor       Objective:      Physical Exam   Constitutional: She is oriented to person, place, and time.   overweight   Eyes: EOM are normal.   Neck: Normal range of motion.   Cardiovascular: Normal rate, regular rhythm and normal heart sounds.    Pulmonary/Chest: Effort normal and breath sounds normal.   Abdominal: Soft. There is no tenderness. There is no rebound and no guarding.   Musculoskeletal: Normal range of motion.   Neurological: She is alert and oriented to person, place,  and time.   Skin: No rash noted.       Assessment:       1. Essential hypertension Well controlled   2. Anxiety Well controlled   3. Depression, unspecified depression type Well controlled   4. Hyperlipidemia, unspecified hyperlipidemia type Active   5. Overweight (BMI 25.0-29.9) Sub-optimally controlled   6. Need for shingles vaccine Active   7. Preventative health care Active       Plan:         Essential hypertension  Comments:  based on age, pt. at goal for BP  Orders:  -     CBC auto differential; Future; Expected date: 9/19/17  -     Comprehensive metabolic panel; Future; Expected date: 9/19/17  -     Urinalysis; Future; Expected date: 9/19/17    Anxiety  Comments:  continue effexor  Orders:  -     venlafaxine (EFFEXOR-XR) 37.5 MG 24 hr capsule; Take 1 capsule (37.5 mg total) by mouth once daily.  Dispense: 30 capsule; Refill: 6    Depression, unspecified depression type  Comments:  continue effexor  Orders:  -     venlafaxine (EFFEXOR-XR) 37.5 MG 24 hr capsule; Take 1 capsule (37.5 mg total) by mouth once daily.  Dispense: 30 capsule; Refill: 6    Hyperlipidemia, unspecified hyperlipidemia type  Comments:  continue current regimen and encouraged diet modification   Orders:  -     Lipid panel; Future; Expected date: 9/19/17    Overweight (BMI 25.0-29.9)  Comments:  encouraged diet     Need for shingles vaccine  -     zoster vaccine live, PF, (ZOSTAVAX, PF,) 19,400 unit/0.65 mL injection; Inject 19,400 Units into the skin once.  Dispense: 1 vial; Refill: 0    Preventative health care  -     CBC auto differential; Future; Expected date: 9/19/17  -     Comprehensive metabolic panel; Future; Expected date: 9/19/17  -     Lipid panel; Future; Expected date: 9/19/17  -     Urinalysis; Future; Expected date: 9/19/17

## 2017-07-24 PROBLEM — Z00.00 PREVENTATIVE HEALTH CARE: Status: RESOLVED | Noted: 2017-04-19 | Resolved: 2017-07-24

## 2017-09-18 ENCOUNTER — LAB VISIT (OUTPATIENT)
Dept: LAB | Facility: HOSPITAL | Age: 82
End: 2017-09-18
Attending: INTERNAL MEDICINE
Payer: MEDICARE

## 2017-09-18 DIAGNOSIS — I10 ESSENTIAL HYPERTENSION: ICD-10-CM

## 2017-09-18 DIAGNOSIS — Z00.00 PREVENTATIVE HEALTH CARE: ICD-10-CM

## 2017-09-18 LAB
BILIRUB UR QL STRIP: NEGATIVE
CLARITY UR REFRACT.AUTO: CLEAR
COLOR UR AUTO: NORMAL
GLUCOSE UR QL STRIP: NEGATIVE
HGB UR QL STRIP: NEGATIVE
KETONES UR QL STRIP: NEGATIVE
LEUKOCYTE ESTERASE UR QL STRIP: NEGATIVE
NITRITE UR QL STRIP: NEGATIVE
PH UR STRIP: 8 [PH] (ref 5–8)
PROT UR QL STRIP: NEGATIVE
SP GR UR STRIP: 1.01 (ref 1–1.03)
URN SPEC COLLECT METH UR: NORMAL
UROBILINOGEN UR STRIP-ACNC: NEGATIVE EU/DL

## 2017-09-18 PROCEDURE — 81003 URINALYSIS AUTO W/O SCOPE: CPT

## 2017-10-19 ENCOUNTER — OFFICE VISIT (OUTPATIENT)
Dept: INTERNAL MEDICINE | Facility: CLINIC | Age: 82
End: 2017-10-19
Payer: MEDICARE

## 2017-10-19 VITALS
DIASTOLIC BLOOD PRESSURE: 80 MMHG | OXYGEN SATURATION: 96 % | WEIGHT: 170.19 LBS | SYSTOLIC BLOOD PRESSURE: 140 MMHG | HEART RATE: 95 BPM | BODY MASS INDEX: 31.32 KG/M2 | HEIGHT: 62 IN

## 2017-10-19 DIAGNOSIS — F32.A DEPRESSION, UNSPECIFIED DEPRESSION TYPE: ICD-10-CM

## 2017-10-19 DIAGNOSIS — I10 ESSENTIAL HYPERTENSION: Primary | ICD-10-CM

## 2017-10-19 DIAGNOSIS — E66.9 CLASS 1 OBESITY WITH SERIOUS COMORBIDITY AND BODY MASS INDEX (BMI) OF 31.0 TO 31.9 IN ADULT, UNSPECIFIED OBESITY TYPE: ICD-10-CM

## 2017-10-19 DIAGNOSIS — F41.9 ANXIETY: ICD-10-CM

## 2017-10-19 DIAGNOSIS — Z23 NEED FOR PROPHYLACTIC VACCINATION WITH STREPTOCOCCUS PNEUMONIAE (PNEUMOCOCCUS) AND INFLUENZA VACCINES: ICD-10-CM

## 2017-10-19 DIAGNOSIS — I48.20 CHRONIC ATRIAL FIBRILLATION: ICD-10-CM

## 2017-10-19 DIAGNOSIS — E78.5 HYPERLIPIDEMIA, UNSPECIFIED HYPERLIPIDEMIA TYPE: ICD-10-CM

## 2017-10-19 DIAGNOSIS — R80.9 PROTEINURIA, UNSPECIFIED TYPE: ICD-10-CM

## 2017-10-19 PROCEDURE — 90732 PPSV23 VACC 2 YRS+ SUBQ/IM: CPT | Mod: S$GLB,,, | Performed by: INTERNAL MEDICINE

## 2017-10-19 PROCEDURE — 90662 IIV NO PRSV INCREASED AG IM: CPT | Mod: S$GLB,,, | Performed by: INTERNAL MEDICINE

## 2017-10-19 PROCEDURE — G0008 ADMIN INFLUENZA VIRUS VAC: HCPCS | Mod: S$GLB,,, | Performed by: INTERNAL MEDICINE

## 2017-10-19 PROCEDURE — 99397 PER PM REEVAL EST PAT 65+ YR: CPT | Mod: S$GLB,,, | Performed by: INTERNAL MEDICINE

## 2017-10-19 PROCEDURE — G0009 ADMIN PNEUMOCOCCAL VACCINE: HCPCS | Mod: S$GLB,,, | Performed by: INTERNAL MEDICINE

## 2017-10-19 PROCEDURE — 99499 UNLISTED E&M SERVICE: CPT | Mod: S$GLB,,, | Performed by: INTERNAL MEDICINE

## 2017-10-19 PROCEDURE — 99999 PR PBB SHADOW E&M-EST. PATIENT-LVL IV: CPT | Mod: PBBFAC,,, | Performed by: INTERNAL MEDICINE

## 2017-10-19 NOTE — PROGRESS NOTES
Pt. ID: Katherine Hernandez is a 86 y.o. female      Chief complaint:   Chief Complaint   Patient presents with    Hypertension     follow up       HPI: Pt. Here for well visit and f/u for HTN; daughter, Anca, is with the pt.; repeat U/A dated 9/18/17 was negative for protein; based on age, BP is at goal; pt. Has gained significant weight; she states she has been sedentary; of note, HR was 114 and repeat was 95; shingles vaccine was last month; I reviewed labs dated 6/1/17 from Dr. Collins; cholesterol was well controlled; she is seeing cardiology for A-fib and is on Eliquis; effexor helps anxiety and depression; she would like a flu shot and pneumonia shot; she does not want tetanus shot       Review of Systems   Constitutional: Negative for chills and fever.   Respiratory: Negative for cough and shortness of breath.    Cardiovascular: Negative for chest pain.   Gastrointestinal: Negative for abdominal pain, nausea and vomiting.   Genitourinary: Negative for dysuria.   Psychiatric/Behavioral: Positive for depression. The patient is nervous/anxious.         Anxiety and depression well controlled with current regimen          Objective:    Physical Exam   Constitutional: She is oriented to person, place, and time.   obese   Eyes: EOM are normal.   Neck: Normal range of motion.   Cardiovascular: Normal rate, regular rhythm and normal heart sounds.    Pulmonary/Chest: Effort normal and breath sounds normal.   Abdominal: Soft. There is no tenderness. There is no rebound and no guarding.   Musculoskeletal: Normal range of motion.   Gait instability: uses a walker   Neurological: She is alert and oriented to person, place, and time.   Skin: No rash noted.         Health Maintenance   Topic Date Due    Pneumococcal (65+) (1 of 2 - PCV13) 07/11/1996    Lipid Panel  06/01/2022    TETANUS VACCINE  10/19/2027    Zoster Vaccine  Completed    Influenza Vaccine  Completed         Assessment:     1. Essential hypertension Well  controlled   2. Proteinuria, unspecified type Well controlled   3. Hyperlipidemia, unspecified hyperlipidemia type Well controlled   4. Chronic atrial fibrillation Well controlled   5. Anxiety Well controlled   6. Depression, unspecified depression type Well controlled   7. Need for prophylactic vaccination with Streptococcus pneumoniae (Pneumococcus) and Influenza vaccines Active   8. Class 1 obesity with serious comorbidity and body mass index (BMI) of 31.0 to 31.9 in adult, unspecified obesity type Sub-optimally controlled         Plan: Katherine was seen today for hypertension.    Diagnoses and all orders for this visit:    Essential hypertension  Comments:  based on age, BP is well controlled; encouraged low Na diet and weight loss    Proteinuria, unspecified type  Comments:  repeat U/A negative; continue ARB    Hyperlipidemia, unspecified hyperlipidemia type  Comments:  continue current regimen and encouraged diet modification     Chronic atrial fibrillation  Comments:  rate controlled; continue current regimen     Anxiety  Comments:  continue current regimen     Depression, unspecified depression type  Comments:  continue current regimen     Need for prophylactic vaccination with Streptococcus pneumoniae (Pneumococcus) and Influenza vaccines  -     Influenza - High Dose (65+) (PF) (IM)  -     Pneumococcal Polysaccharide Vaccine (23 Valent) (SQ/IM)    Class 1 obesity with serious comorbidity and body mass index (BMI) of 31.0 to 31.9 in adult, unspecified obesity type        Problem List Items Addressed This Visit        Psychiatric    Anxiety    Depression       Cardiac/Vascular    Hypertension - Primary    Atrial fibrillation    Hyperlipidemia       Renal/    Proteinuria    Overview     repeat U/A negative; continue ARB            ID    Need for prophylactic vaccination with Streptococcus pneumoniae (Pneumococcus) and Influenza vaccines    Relevant Orders    Influenza - High Dose (65+) (PF) (IM)     Pneumococcal Polysaccharide Vaccine (23 Valent) (SQ/IM)       Endocrine    Class 1 obesity with serious comorbidity and body mass index (BMI) of 31.0 to 31.9 in adult      Other Visit Diagnoses    None.

## 2017-12-04 DIAGNOSIS — F41.9 ANXIETY: ICD-10-CM

## 2017-12-04 DIAGNOSIS — F32.A DEPRESSION, UNSPECIFIED DEPRESSION TYPE: ICD-10-CM

## 2017-12-04 RX ORDER — VENLAFAXINE HYDROCHLORIDE 37.5 MG/1
37.5 CAPSULE, EXTENDED RELEASE ORAL DAILY
Qty: 30 CAPSULE | Refills: 6 | Status: SHIPPED | OUTPATIENT
Start: 2017-12-04 | End: 2017-12-04 | Stop reason: SDUPTHER

## 2017-12-04 RX ORDER — VENLAFAXINE HYDROCHLORIDE 37.5 MG/1
37.5 CAPSULE, EXTENDED RELEASE ORAL DAILY
Qty: 30 CAPSULE | Refills: 2 | Status: SHIPPED | OUTPATIENT
Start: 2017-12-04 | End: 2018-11-06 | Stop reason: SDUPTHER

## 2017-12-14 PROBLEM — I48.91 ATRIAL FIBRILLATION WITH RVR: Status: ACTIVE | Noted: 2017-12-14

## 2017-12-14 PROBLEM — I50.33 ACUTE ON CHRONIC DIASTOLIC CHF (CONGESTIVE HEART FAILURE): Status: ACTIVE | Noted: 2017-12-14

## 2017-12-14 PROBLEM — I50.9 ACUTE ON CHRONIC CONGESTIVE HEART FAILURE: Status: ACTIVE | Noted: 2017-12-14

## 2017-12-14 PROBLEM — R79.89 ELEVATED TROPONIN: Status: ACTIVE | Noted: 2017-12-14

## 2017-12-14 PROBLEM — J96.01 ACUTE HYPOXEMIC RESPIRATORY FAILURE: Status: ACTIVE | Noted: 2017-12-14

## 2017-12-16 PROBLEM — I50.9 ACUTE ON CHRONIC CONGESTIVE HEART FAILURE: Status: ACTIVE | Noted: 2017-12-16

## 2017-12-18 ENCOUNTER — TELEPHONE (OUTPATIENT)
Dept: INTERNAL MEDICINE | Facility: CLINIC | Age: 82
End: 2017-12-18

## 2017-12-18 PROBLEM — J96.01 ACUTE HYPOXEMIC RESPIRATORY FAILURE: Status: RESOLVED | Noted: 2017-12-14 | Resolved: 2017-12-18

## 2017-12-18 NOTE — TELEPHONE ENCOUNTER
----- Message from Sarah Solano sent at 12/18/2017  2:23 PM CST -----  Contact: Stephani Queen from [facility] [phone]  Calling to talk to nurse concerning patients home health services. Please advice

## 2017-12-18 NOTE — TELEPHONE ENCOUNTER
Spoke with Stephani w/ [facility] just to confirm if pt is establish with us. Informed Stephani that Ms Katherine Hernandez is establish w/ Dr Gaspar. She voices understanding.

## 2017-12-19 ENCOUNTER — OUTPATIENT CASE MANAGEMENT (OUTPATIENT)
Dept: ADMINISTRATIVE | Facility: OTHER | Age: 82
End: 2017-12-19

## 2017-12-20 ENCOUNTER — OUTPATIENT CASE MANAGEMENT (OUTPATIENT)
Dept: ADMINISTRATIVE | Facility: OTHER | Age: 82
End: 2017-12-20

## 2017-12-20 NOTE — PATIENT INSTRUCTIONS
What Can Cause Depression?  Depression is a real illness and certain factors have been known to trigger it. Below are some common known causes. Any of these factors, or a combination of them, can make depression more likely. Sometimes, depression occurs for no one clear reason. But no matter what the cause, depression can be treated.    Loss or stress  Depression can occur in children and adults, but it often starts in adulthood. Normal grief over a death, breakup, or other loss may become depression. Life stresses such as physical abuse, job loss, or sudden change in finances can also trigger depression. In some cases, years go by before the depression sets in.  Family history  The tendency to develop depression seems to run in families. If one or more of your close relatives (parents, grandparents, or siblings) have had an episode of depression, you may be more likely to develop the illness, too.  Drugs or alcohol  Drugs and alcohol can upset the chemical balance in the brain. This can lead to an episode of depression. Some depressed people turn to drugs or alcohol to numb the pain. But in the long run, doing so just makes depression worse.  Medicines  Depression can be a side effect of some medicines for high blood pressure, cancer, pain, and other health problems. So tell your doctor about all medicines you take. But never stop taking one without your doctors OK.  Physical illness  Being sick can make anyone feel frustrated and sad. But some health problems may cause actual changes in your brain that lead to depression. Other health problems, such as an underactive thyroid, may be mistaken for depression.  Hormones  Hormones carry messages in the bloodstream. They may affect brain chemicals, leading to depression. Women may get depressed when their hormone levels change quickly, such as just before their period, after giving birth, or during menopause.  Date Last Reviewed: 1/1/2017  © 1434-5212 [employer]. [address] [place] [address], [place] [address]. All rights reserved. This information is not intended as a substitute for professional medical care. Always follow your healthcare professional's instructions.        Eating Heart-Healthy Foods  Eating has a big impact on your heart health. In fact, eating healthier can improve several of your heart risks at once. For instance, it helps you manage weight, cholesterol, and blood pressure. Here are ideas to help you make heart-healthy changes without giving up all the foods and flavors you love.  Getting started  · Talk with your health care provider about eating plans, such as the DASH or Mediterranean diet. You may also be referred to a dietitian.  · Change a few things at a time. Give yourself time to get used to a few eating changes before adding more.  · Work to create a tasty, healthy eating plan that you can stick to for the rest of your life.    Goals for healthy eating  Below are some tips to improve your eating habits:  · Limit saturated fats and trans fats. Saturated fats raise your levels of cholesterol, so keep these fats to a minimum. They are found in foods such as fatty meats, whole milk, cheese, and palm and coconut oils. Avoid trans fats because they lower good cholesterol as well as raise bad cholesterol. Trans fats are most often found in processed foods.  · Reduce sodium (salt) intake. Eating too much salt may increase your blood pressure. Limit your sodium intake to 2,300 milligrams (mg) per day, or less if your health care provider recommends it. Dining out less often and eating fewer processed foods are two great ways to decrease the amount of salt you consume.  · Managing calories. A calorie is a unit of energy. Your body burns calories for fuel, but if you eat more calories than your body burns, the extras are stored as fat. Your health care provider can help you create a diet plan to manage your calories. This will likely include  eating healthier foods as well as exercising regularly. To help you track your progress, keep a diary to record what you eat and how often you exercise.  Choose the right foods  Aim to make these foods staples of your diet. If you have diabetes, you may have different recommendations than what is listed here:  · Fruits and vegetable provide plenty of nutrients without a lot of calories. At meals, fill half your plate with these foods. Split the other half of your plate between whole grains and lean protein.  · Whole grains are high in fiber and rich in vitamins and nutrients. Good choices include whole-wheat bread, pasta, and brown rice.  · Lean proteins give you nutrition with less fat. Good choices include fish, skinless chicken, and beans.  · Low-fat or nonfat dairy provides nutrients without a lot of fat. Try low-fat or nonfat milk, cheese, or yogurt.  · Healthy fats can be good for you in small amounts. These are unsaturated fats, such as olive oil, nuts, and fish. Try to have at least 2 servings per week of fatty fish such as salmon, sardines, mackerel, rainbow trout, and albacore tuna. These contain omega-3 fatty acids, which are good for your heart. Flaxseed is another source of a heart-healthy fat.  More on heart healthy eating    Read food labels  Healthy eating starts at the grocery store. Be sure to pay attention to food labels on packaged foods. Look for products that are high in fiber and protein, and low in saturated fat, cholesterol, and sodium. Avoid products that contain trans fat. And pay close attention to serving size. For instance, if you plan to eat two servings, double all the numbers on the label.  Prepare food right  A key part of healthy cooking is cutting down on added fat and salt. Look on the internet for lower-fat, lower-sodium recipes. Also, try these tips:  · Remove fat from meat and skin from poultry before cooking.  · Skim fat from the surface of soups and sauces.  · Broilroniil,  bake, steam, grill, and microwave food without added fats.  · Choose ingredients that spice up your food without adding calories, fat, or sodium. Try these items: horseradish, hot sauce, lemon, mustard, nonfat salad dressings, and vinegar. For salt-free herbs and spices, try basil, cilantro, cinnamon, pepper, and rosemary.  Date Last Reviewed: 6/25/2015 © 2000-2017 [employer]. [address] [place] [address]. All rights reserved. This information is not intended as a substitute for professional medical care. Always follow your healthcare professional's instructions.        Women and Heart Disease: What Women Need to Know  You may be surprised to learn that heart disease is the biggest threat to your health--even more so than breast cancer. And the same factors that put you at risk of a heart attack, also known as acute myocardial infarction, or AMI, also increase your chances of stroke and other health problems. The main risk factors include high blood pressure, smoking, poor diet, diabetes, family history, obesity, and sedentary lifestyle. If your heart is in trouble, your body may send you warning signs. Its up to you to notice these and talk to your healthcare provider about them. Your health--and your life--could depend on it.    Learn to read the signs  When your heart isnt getting enough oxygen, you may experience a feeling called angina. It can be a sign that you are at risk for having a heart attack.  Angina is often referred to as chest pain, but this can be misleading. Its not always painful, and its not always in the chest. Many women have other symptoms along with--or instead of--chest pain or discomfort. Talk to your healthcare provider if you notice any of the following:  · Discomfort, aching, tightness, or pressure that comes and goes, in the back, abdomen, arm, shoulder, neck, or jaw or chest  · Feeling much more tired than usual, for no clear reason  · Becoming  breathless while doing something that used to be easy  · Heartburn, nausea, or a burning feeling that seems unrelated to food  · Lightheadedness or faintness  Get to the heart of the problem  Women often dont realize their symptoms could be related to heart trouble. Even some healthcare providers dont make the connection. If you feel any of the symptoms listed here, see your healthcare provider and ask to be tested for heart disease--even if youre not sure thats the cause. Tests, such as a stress echocardiogram and nuclear imaging, will reveal more about the problem. If your symptoms are heart-related, your healthcare provider will start treatment.  Hormone therapy is not the answer  Healthcare providers used to think that older women could reduce their heart disease risk by taking hormones in pill form (hormone therapy, or HT). It turns out thats not true. In fact, HT could actually increase your risk of having a heart attack or stroke. Talk to your healthcare provider about the risks and benefits of HT. It may be prescribed for other health problems. But it should not be taken to prevent or treat heart disease.     Is it angina or a heart attack?  Angina that occurs on exertion and goes away after a few minutes of rest or with medicine is considered stable angina. Unstable angina is unpredictable or unexpected angina symptoms that do not resolve, or go away and come back. This is a medical emergency and could be a sign of an active heart attack. Call 911 right away!   Date Last Reviewed: 4/8/2016  © 6607-4097 [employer]. [address] [place] [address], [place] [address]. All rights reserved. This information is not intended as a substitute for professional medical care. Always follow your healthcare professional's instructions.        Left- or Right- Side Congestive Heart Failure (CHF)    The heart is a large muscle. It is a pump that circulates blood throughout the body. Blood carries oxygen to all of  the organs, including the brain, muscles, and skin. After your body takes the oxygen out of the blood, the blood returns to the heart. The right side of the heart collects the blood from the body and pumps it to the lungs. In the lungs, it gets fresh oxygen and gives up carbon dioxide. The oxygen-rich blood from the lungs then returns to the left side of the heart, where it is pumped back out to the rest of your body, starting the process all over.  Congestive heart failure (CHF) occurs when the heart muscle is weakened. This affects the pumping action of the heart. Heart failure can affect the right side of the heart or the left side. But heart failure may affect not only the right side of the heart or only the left side. Although it may have started on one side, it can and often eventually does affect both sides.  Right-side heart failure  When the right side of the heart is weakened, it cant handle the blood it is getting from the rest of the body. This blood returns to the heart through veins. When too much pressure builds up in the veins, fluid leaks out into the tissues. Gravity then causes that fluid to move to those parts of the body that are the lowest. So one of the first symptoms of right-side CHF can include swelling in the feet and ankles. If the condition gets worse, the swelling can even go up past the knees. Sometimes it gets so severe, the liver can get congested as well.  Left-side heart failure  When the left side of the heart is weakened, it cant handle the blood it gets from the lungs. Pressure then builds up in the veins of the lungs, causing fluid to leak into the lung tissues. This may cause CHF and pulmonary edema. This causes you to feel short of breath, weak, or dizzy. These symptoms are often worse with exertion, such as when climbing stairs or walking up hills. Lying with your head flat is uncomfortable and can make your breathing worse. This may make sleeping difficult. You may need  to use extra pillows to elevate your upper body to sleep well. The same is true when just resting during the daytime.  There are many causes of heart failure including:  · Coronary artery disease  · Past heart attack (also known as acute myocardial infarction, or AMI)  · High blood pressure  · Damaged heart valve  · Diabetes  · Obesity  · Cigarette smoking  · Alcohol abuse  Heart failure is a chronic condition. There is no cure. The purpose of medical treatment is to improve the pumping action of the heart. The main way to do this is to remove excess water from the body. A number of medicines can help reach this goal, improve symptoms, and prevent the heart from becoming weaker. Sometimes, heart failure can become so severe that a device is placed in the heart to help with pumping. Another major goal is to better treat the causes of heart failure, such as diabetes and high blood pressure, by making changes in your lifestyle and maximizing medical control when needed.  Home care  Follow these guidelines when caring for yourself at home:  · Check your weight every day. This is very important because a sudden increase in weight gain could mean worsening heart failure. Keep these things in mind:  ¨ Use the same scale every day.  ¨ Weigh yourself at the same time every day.  ¨ Make sure the scale is on a hard floor surface, not on a rug or carpet.  ¨ Keep a record of your weight every day so your healthcare provider can see it. If you are not given a log sheet for this, keep a separate journal for this purpose.   · Cut back on the amount of salt (sodium) you eat. Follow your healthcare provider's recommendation on how much salt or sodium you should have each day.  ¨ Avoid high-salt foods. These include olives, pickles, smoked meats, salted potato chips, and most prepared foods.  ¨ Don't add salt to your food at the table. Use only small amounts of salt when cooking.  ¨ Read the labels carefully on food packages to learn  how much salt or sodium is in each serving in the package. Remember, a can or package of food may contain more than 1 serving. So if you eat all the food in the package, you may be getting more salt than you think.  · Follow your healthcare provider's recommendations about how much fluid you should have. Be aware that some foods, such as soup, pudding, and juicy fruits like oranges or melons, contain liquid. You'll need to count the liquid in those foods as part of your daily fluid intake. Your provider can help you with this.  · Stop smoking.  · Cut back on how much alcohol you drink.  · Lose weight if you are overweight. The excess weight adds a lot of stress on the workload of the heart.  · Stay active. Talk with your provider about an exercise program that is safe for your heart.  · Keep your feet elevated to reduce swelling. Ask your provider about support hose as a preventive treatment for daytime leg swelling.  Besides taking your medicine as instructed, an important part of treatment is lifestyle changes. These include diet, physical activity, stopping smoking, and weight control.  Improve your diet by including more fresh foods, cutting back on how much sugar and saturated fat you eat, and eating fewer processed foods and less salt.  Follow-up care  Follow up with your healthcare provider, or as advised.  Make sure to keep any appointments that were made for you. These can help better control your congestive heart failure. You will need to follow up with your provider on a routine basis to make sure your heart failure is well managed.  If an X-ray, ECG, or other tests were done, you will be told of any new findings that may affect your care.  Call 911  Call 911 if you:  · Become severely short of breath  · Feel lightheaded, or feel like you might pass out or faint  · Have chest pain or discomfort that is different than usual, the medicines your doctor told you to use for this don't help, or the pain lasts  longer than 10 to 15 minutes  · You suddenly develop a rapid heart rate  When to seek medical advice  The following may be signs that your heart failure is getting worse. Call your healthcare provider right away if any of these happen:  · Sudden weight gain. This means 3 or more pounds in one day, or 5 or more pounds in 1 week.  · Trouble breathing not related to being active  · New or increased swelling of your legs or ankles  · Swelling or pain in your abdomen  · Breathing trouble at night. This means waking up short of breath or needing more pillows to breathe.  · Frequent coughing that doesnt go away  · Feeling much more tired than usual  Date Last Reviewed: 1/4/2016  © 8758-0538 [employer]. [address] [place] [address], [place] [address]. All rights reserved. This information is not intended as a substitute for professional medical care. Always follow your healthcare professional's instructions.        What is Heart Failure?  The heart is a muscle that pumps oxygen-rich blood to all parts of the body. When you have heart failure, the heart is not able to pump as well as it should. Blood and fluid may back up into the lungs, and some parts of the body dont get enough oxygen-rich blood to work normally. These problems lead to the symptoms you feel.  When you have heart failure  With heart failure, not enough oxygen-rich blood leaves the heart with each beat. There are 2 types of heart failure. Both affect the ventricles ability to pump blood. You may have 1 or both types.       Systolic heart failure. The heart muscle becomes weak and enlarged. It cant pump enough oxygen-rich blood forward to the rest of the body when the ventricles contract. The measurement of how much blood your heart pushes out when it beats is called ejection fraction. In systolic heart failure, the ejection fraction is lower than normal. This can cause blood to back up into the lungs and cause shortness of breath and eventually  ankle swelling (edema).  This is also called heart failure with reduced ejection fraction, or  HFrEF.    Diastolic heart failure. The heart muscle becomes stiff. It doesnt relax normally between contractions, which keeps the ventricles from filling with blood. Ejection fraction is often in the normal range. This can still lead to the backup of blood into the body and affect the organs such as the liver. This is also called heart failure with preserved ejection fraction or HFpEF.     Recognizing heart failure symptoms  When you have heart failure, you need to pay close attention to your body and how you feel, every single day. That way, if a problem occurs, you can get help before it becomes too severe. You'll need to watch for changes in your symptoms. As long as symptoms stay about the same from one day to the next, your heart failure is stable. But if symptoms start to get worse, it's time to take action.  Signs and symptoms of worsening heart failure include:  · Rapid weight gain  · Shortness of breath  · Swelling (edema)  · Fatigue  How heart failure affects your body  When the heart doesn't pump enough blood, hormones (body chemicals) are sent to increase the amount of work the heart does. Some hormones make the heart grow larger. Others tell the heart to pump faster. As a result, the heart may pump more blood at first, but it can't keep up with the ongoing demands. So, the heart muscle becomes even more weak. Over time, even less blood is pumped through the heart. This leads to problems throughout the body as organs began to feel the effects of a long-term lack of oxygen. Eventually, if untreated, this can cause problems with your lungs, liver, kidneys, and loss of elasticity in the skin, and skin changes in the lower legs. A weak heart itself can eventually cause a severe decline in health and possible death if left untreated.  What is ejection fraction?  Ejection fraction (EF) measures how much blood the  heart pumps out (ejects). This is measured to help diagnose heart failure. A healthy heart pumps at least half of the blood from the ventricles with each beat. This means a normal ejection fraction is around 50% to 70%. Your doctor will calculate ejection fraction from an echocardiogram.     My ejection fraction     Date: ______________________  Ejection fraction: _____________  Test used: __________________  Date Last Reviewed: 3/15/2016  © 4929-2851 [employer]. [address] [place] [address], [place] [address]. All rights reserved. This information is not intended as a substitute for professional medical care. Always follow your healthcare professional's instructions.        Heart Failure: Making Changes to Your Diet  You have a condition called heart failure. When you have heart failure, excess fluid is more likely to build up in your body because your heart isn't working well. This makes the heart work harder to pump blood. Fluid buildup causes symptoms such as shortness of breath and swelling (edema). This is often referred to as congestive heart failure or CHF. Controlling the amount of salt (sodium) you eat may help stop fluid from building up. Your doctor may also tell you to reduce the amount of fluid you drink.  Reading food labels    Your healthcare provider will tell you how much sodium you can eat each day. Read food labels to keep track. Keep in mind that certain foods are high in salt. These include canned, frozen, and processed foods. Check the amount of sodium in each serving. Watch out for high-sodium ingredients. These include MSG (monosodium glutamate), baking soda, and sodium phosphate.   Eating less salt  Give yourself time to get used to eating less salt. It may take a little while. Here are some tips to help:  · Take the saltshaker off the table. Replace it with salt-free herb mixes and spices.  · Eat fresh or plain frozen vegetables. These have much less salt than canned  vegetables.  · Choose low-sodium snacks like sodium-free pretzels, crackers, or air-popped popcorn.  · Dont add salt to your food when youre cooking. Instead, season your foods with pepper, lemon, garlic, or onion.  · When you eat out, ask that your food be cooked without added salt.  · Avoid eating fried foods as these often have a great deal of salt.  If youre told to limit fluids  You may need to limit how much fluid you have to help prevent swelling. This includes anything that is liquid at room temperature, such as ice cream and soup. If your doctor tells you to limit fluid, try these tips:  · Measure drinks in a measuring cup before you drink them. This will help you meet daily goals.  · Chill drinks to make them more refreshing.  · Suck on frozen lemon wedges to quench thirst.  · Only drink when youre thirsty.  · Chew sugarless gum or suck on hard candy to keep your mouth moist.  · Weigh yourself daily to know if your body's fluid content is rising.  My sodium goal  Your healthcare provider may give you a sodium goal to meet each day. This includes sodium found in food as well as salt that you add. My goal is to eat no more than ___________ mg of sodium per day.     When to call your doctor  Call your doctor right away if you have any symptoms of worsening heart failure. These can include:  · Sudden weight gain  · Increased swelling of your legs or ankles  · Trouble breathing when youre resting or at night  · Increase in the number of pillows you have to sleep on  · Chest pain, pressure, discomfort, or pain in the jaw, neck, or back   Date Last Reviewed: 3/21/2016  © 4640-5938 [employer]. [address] [place] [address], [place] [address]. All rights reserved. This information is not intended as a substitute for professional medical care. Always follow your healthcare professional's instructions.        Leg Swelling in Both Legs    Swelling of the feet, ankles, and legs is called edema. It is  caused by excess fluid that has collected in the tissues. Extra fluid in the body settles in the lowest part because of gravity. This is why the legs and feet are most affected.  Some of the causes for edema include:  · Disease of the heart like congestive heart failure  · Standing or sitting for long periods of time  · Infection of the feet or legs  · Blood pooling in the veins of your legs (venous insufficiency)  · Dilated veins in your lower leg (varicose veins)  · Garters or other clothing that is tight on your legs. This will cause blood to pool in your legs because the clothing limits blood flow.  · Some medicines such as hormones like birth control pills, some blood pressure medicines like calcium channel blockers (amlodipine) and steroids, some antidepressants like MAO inhibitors and tricyclics  · Menstrual periods that cause you to retain fluids  · Many types of renal disease  · Liver failure or cirrhosis  · Pregnancy, some swelling is normal, but a sudden increase in leg swelling or weight gain can be a sign of a dangerous complication of pregnancy  · Poor nutrition  · Thyroid disease  Medical treatment will depend on what is causing the swelling in your legs. Your healthcare provider may prescribe water pills (diuretics) to get rid of the extra fluid.  Home care  Follow these guidelines when caring for yourself at home:  · Don't wear clothing like garters that is tight on your legs.  · Keep your legs up while lying or sitting.  · If infection, injury, or recent surgery is causing the swelling, stay off your legs as much as possible until symptoms get better.  · If your healthcare provider says that your leg swelling is caused by venous insufficiency or varicose veins, don't sit or  one place for long periods of time. Take breaks and walk about every few hours. Brisk walking is a good exercise. It helps circulate the blood that has collected in your leg. Talk with your provider about using support  stockings to stop daytime leg swelling.  · If your provider says that heart disease is causing your leg swelling, follow a low-salt diet to stop extra fluid from staying in your body. You may also need medicine.  Follow-up care  Follow up with your healthcare provider, or as advised.  When to seek medical advice  Call your healthcare provider right away if any of these occur:  · New shortness of breath or chest pain  · Shortness of breath or chest pain that gets worse  · Swelling in both legs or ankles that gets worse  · Swelling of the abdomen  · Redness, warmth, or swelling in one leg  · Fever of 100.4ºF (38ºC) or higher, or as directed by your healthcare provider  · Yellow color to your skin or eyes  · Rapid, unexplained weight gain  · Having to sleep upright or use an increased number of pillows  Date Last Reviewed: 3/31/2016  © 4488-1029 [employer]. [address] [place] [address]. All rights reserved. This information is not intended as a substitute for professional medical care. Always follow your healthcare professional's instructions.        Fall Prevention  Falls often occur due to slipping, tripping or losing your balance. Millions of people fall every year and injure themselves. Here are ways to reduce your risk of falling again.  · Think about your fall, was there anything that caused your fall that can be fixed, removed, or replaced?  · Make your home safe by keeping walkways clear of objects you may trip over.  · Use non-slip pads under rugs. Do not use area rugs or small throw rugs.  · Use non-slip mats in bathtubs and showers.  · Install handrails and lights on staircases.  · Do not walk in poorly lit areas.  · Do not stand on chairs or wobbly ladders.  · Use caution when reaching overhead or looking upward. This position can cause a loss of balance.  · Be sure your shoes fit properly, have non-slip bottoms and are in good condition.   · Wear shoes both inside and out. Avoid  going barefoot or wearing slippers.  · Be cautious when going up and down stairs, curbs, and when walking on uneven sidewalks.  · If your balance is poor, consider using a cane or walker.  · If your fall was related to alcohol use, stop or limit alcohol intake.   · If your fall was related to use of sleeping medicines, talk to your doctor about this. You may need to reduce your dosage at bedtime if you awaken during the night to go to the bathroom.    · To reduce the need for nighttime bathroom trips:  ¨ Avoid drinking fluids for several hours before going to bed  ¨ Empty your bladder before going to bed  ¨ Men can keep a urinal at the bedside  · Stay as active as you can. Balance, flexibility, strength, and endurance all come from exercise. They all play a role in preventing falls. Ask your healthcare provider which types of activity are right for you.  · Get your vision checked on a regular basis.  · If you have pets, know where they are before you stand up or walk so you don't trip over them.  · Use night lights.  Date Last Reviewed: 11/5/2015 © 2000-2017 [employer]. [address] [place] [address]. All rights reserved. This information is not intended as a substitute for professional medical care. Always follow your healthcare professional's instructions.        Preventing Falls in the Home  An adult or child can fall for many reasons. If you are an older adult, you may fall because your reaction time slows down. Your muscles and joints may get stiff, weak, or less flexible because of illness, medicines, or a physical condition. These things can also make a child more likely to fall or be injured in a fall.  Other health problems that make falls more likely include:  · Arthritis  · Dizziness or lightheadedness when you get out of bed (orthostatic hypotension)  · History of a stroke  · Dizziness  · Anemia  · Certain medicines taken for mental illness  · Problems with balance or  gait  · History of falls with or without an injury  · Changes in vision (vision impairment)  · Changes in thinking skills and memory (cognitive impairment)  Injuries from a fall can include broken bones, dislocated joints, and cuts. When these injuries are serious enough, they can make it impossible for you or a child who is injured in a fall to live on his or her own.  Prevention tips  To help prevent falls and fall-related injuries, follow the tips below.   Floors  Make floors safer by doing the following:   · Put nonskid pads under area rugs.  · Remove throw rugs.  · Replace worn floor coverings.  · Tack carpets firmly to each step on carpeted stairs. Put nonskid strips on the edges of uncarpeted stairs.  · Keep floors and stairs free of clutter and cords.  · Arrange furniture so there are clear pathways.  · Clean up any spills right away.  · Wear shoes that fit.  Bathrooms    Make bathrooms safer by doing the following:   · Install grab bars in the tub or shower.  · Apply nonskid strips or put a nonskid rubber mat in the tub or shower.  · Sit on a bath chair to bathe.  · Use bathmats with nonskid backing.  Lighting and the environment  Improve lighting in your home by doing the following:   · Keep a flashlight in each room. Or put a lamp next to the bed within easy reach.  · Put nightlights in the bedrooms, hallways, kitchen, and bathrooms.  · Make sure all stairways have good lighting.  · Take your time when going up and down stairs.  · Put handrails on both sides of stairs and in walkways for more support. To prevent injury to your wrist or arm, dont use handrails to pull yourself up.  · Install grab bars to pull yourself up.  · Move or rearrange items that you use often. This will make them easier to find or reach.  · Look at your home to find any safety hazards. Especially look at doorways, walkways, and the driveway. Remove or repair any safety problems that you find.  Date Last Reviewed: 8/1/2016  ©  9503-9130 [employer]. [address] [place] [address], [place] [address]. All rights reserved. This information is not intended as a substitute for professional medical care. Always follow your healthcare professional's instructions.        Preventing Falls: Make Your Health a Priority    Having a health problem can make you more likely to fall. Taking certain kinds of medicines may also increase your risk of falls. So, improving your health can help you avoid a fall. Work with your healthcare provider to manage health problems and to review your medicines. If you have your health under control, your risk of falling is lessened.  How chronic conditions increase your fall risk  Health problems like diabetes, high or low blood pressure, and arthritis are called chronic health conditions. They can be managed, but they don't go away. Chronic health problems put you at greater risk of a fall. This is because they can affect many parts of your body. They may cause problems with movement, balance, or vision. And certain medicines you take for them may have side effects, such as dizziness or drowsiness. These side effects also increase your risk.  Work with your healthcare provider  Your healthcare provider can work with you to help prevent a fall. See your healthcare provider for a medical exam each year. Go more often if you have symptoms, such as leg numbness or dizziness, that could raise your risk of falling. If you have a history of falls, let your provider know. Bring a list of your medicines to review with your healthcare provider. Discuss your nutrition and exercise routine. And ask whether you need any tests to assess your risk of falling.  Review your medicines  Medicines (even ones you buy over the counter) can cause side effects that lead to a fall. Common medicines that cause these kinds of side effects are blood pressure, heart, or pain medicines, medicines for sleep, and antidepressants. Store pain  medicine in a secure area, such as an upper cabinet in your kitchen or bathroom. Don't mix different pills in the same bottle. Always store and travel with medicines in their original containers with clear labels. This will reduce the chance of taking the wrong medicine or too much of a medicine. Taking too much of a medicine or taking the wrong medicine may cause side effects that can increase your risk of falling.  Also, the way your body reacts to medicines can change as you age. So, certain medicines that were fine in the past may cause side effects now. Your healthcare provider (such as your doctor or pharmacist) can help review your medicines and make changes if needed.  Get your eyes and ears checked  Problems with vision or hearing can lead to falls:  · Get your eyes checked at least once a year. Take time to adjust to new glasses.  · Get your hearing checked at least every other year.  · Have your doctor check your inner ear for problems that may affect your balance.  Get the right nutrition  If you don't get enough to eat or drink, you can become dizzy and fall:  · Your sense of thirst decreases with age. Drink water throughout the day.  · Eat breakfast. Plan regular meals.  · Ask your healthcare provider whether you need supplements. These can help strengthen your bones and muscles to help prevent falls. They can also help prevent fractures if you do fall.  Stay as active as you can  Staying active is one of the best things you can do to prevent falls. Keep in mind that doing too little can be as risky as doing too much. That's because not being active can make you weaker and more likely to fall. Balance, flexibility, strength, and endurance all come from exercise. They all play a role in preventing falls. Ask your healthcare provider which types of activity are right for you.  When to call your healthcare provider  Be sure to call your healthcare provider if you fall. Also call if you have any of these  signs or symptoms (someone else may need to point them out to you):  · Feeling lightheaded or dizzy more than once a day  · Losing your balance often or feeling unsteady on your feet  · Feeling numbness in your legs or feet, or noticing a change in the way you walk  · Having a steady decline in your memory or mental sharpness   Date Last Reviewed: 6/13/2015  © 3529-9670 [employer]. [address] [place] [address], [place] [address]. All rights reserved. This information is not intended as a substitute for professional medical care. Always follow your healthcare professional's instructions.        Preventing Pneumonia  Pneumonia is an infection in one or both of the lungs. Those most at risk include older adults, smokers, and people with chronic lung diseases. For example, those with conditions like chronic obstructive pulmonary disease (COPD), including emphysema or chronic bronchitis, asthma, and those with weak immune systems. There are some things you can do to lessen the chance that you will get pneumonia.    Avoid infection  · Wash your hands often:  ¨ Use soap and warm water.  ¨ If soap and water aren't available, use a hand  with alcohol in it.  · Avoid touching your face and mouth with your hands.  · Use disposable tissues instead of a handkerchief. Throw used tissues away.  · Avoid people who have a cold or the flu.  · Try to avoid crowded places.  Get vaccinated  Talk with your healthcare provider about vaccinations and whether you should get a yearly flu shot. There are now 2 different pneumonia vaccines. Both of these are needed if you have a chronic disease or fit into the higher risk category.    · Get a flu shot every year as soon as it's available in your area. The flu shot helps prevent you from getting the flu and complications of the flu, such as pneumonia.  · Get pneumococcal pneumonia vaccines. Talk with your healthcare provider about which pneumococcal vaccines are right for  you.  Do suggested breathing exercises  Deep breathing and coughing exercises can help clear your lungs. Your healthcare provider may suggest them. If so, you will be shown how to do them. Do them as often as your healthcare provider instructs.  Take care of your body  · Drink at least 6 to 8 glasses of water a day.  · Eat well-balanced, healthy meals.  · Avoid drinking alcohol.  · Dont smoke. Avoid places where people are smoking.  · Moving around helps keep your lungs clear. Ask your healthcare provider what type of activity is best for you. Walking is often a good choice.  · Get enough rest. Sleep at least 8 hours each night. Rest or nap during the day as needed.  · Ask your healthcare provider when you should schedule follow-up care to make sure the infection is gone.  Date Last Reviewed: 12/1/2016  © 1655-5104 [employer]. [address] [place] [address], [place] [address]. All rights reserved. This information is not intended as a substitute for professional medical care. Always follow your healthcare professional's instructions.

## 2017-12-20 NOTE — PROGRESS NOTES
Talked to fritz March to perform initial assessment for OPCM.  Patient resides with daughter Anca in which Anca is primary caregiver.  Patient is an 86-year-old female with diagnosis of acute on chronic diastolic CHF, A. fib with RVR, hyperlipidemia, hypertension, anxiety, depression, low back pain and arthralgia.  Medication reconciliation performed and is taking all medications as prescribed.  PH Q-2 performed and score of 0 obtained.  Patient was recently hospitalized from December 14, 2017 until December 18, 2017 at [facility] and was treated for A. fib, elevated troponin, and acute on chronic diastolic CHF.  Patient currently is enrolled with [facility].  Anca reports that the registered nurse visits patient once a week and physical therapy/occupational therapy visits twice a week.  Anca states that patient has fallen once in the past year without injury.  Anca states patient uses a walker to assist with mobility.  Anca states the patient has also wheelchair as well as a cane.  Anca express that it has been a financial strain to the patient and in regards to medical co-pays.  Anca states that when her mother visits with the specialist the co-pay is $50 per visit.  I will in-basket financial assistance to assist patient with a co-pays.  Anca states that the patient's hearing is progressively getting worse.  I will in-basket message Dr. Gaspar in regards in getting a patient an audiology consult.  Anca states that the patient does have a scale and starting tomorrow she will weigh her mother daily.  Educated Anca the importance of wine daily.  Discussed with Anca that if patient gains more than 3 pounds in a 24-hour period or 5 pounds within a week to call Dr. Gaspar as soon as possible.  Anca verbalized understanding of all education provided.  Encouraged Anca and her mother to call this RN if having any questions or concerns.  I will mail educational materials regarding:  CHF, low sodium diet, fall prevention and preventing lung infection.  Anca states that she is medical power of , financial power of , and patient does have a living will established.  Anca states that she does have a disaster planning in place for her mother.  Discussed with Anca that this RN will contact her on December 27, 2017.  Anca verbalized understanding.  Will admit to OPCM.  Will continue to follow.  TIFFANIE Asher-RN    Plan:    -Continue to educate about CHF. Review signs and symptoms of CHF flare up. Encourage patient to follow medication and treatment regimen. Encourage patient to maintain follow up with doctors.   -Continue to educate about fall prevention and safety in home setting. Encourage patient to follow medication and treatment regimen. Encourage patient to maintain follow up with doctors.  -Continue to educate about depression. Review signs and symptoms of worsening of depression. Encourage patient to follow medication and treatment regimen. Encourage patient to maintain follow up with doctors.  -Follow up with financial assistance and audiology consult.    TIFFANIE Asher-RN

## 2017-12-21 ENCOUNTER — OUTPATIENT CASE MANAGEMENT (OUTPATIENT)
Dept: ADMINISTRATIVE | Facility: OTHER | Age: 82
End: 2017-12-21

## 2017-12-21 ENCOUNTER — TELEPHONE (OUTPATIENT)
Dept: INTERNAL MEDICINE | Facility: CLINIC | Age: 82
End: 2017-12-21

## 2017-12-21 DIAGNOSIS — H91.90 HEARING LOSS, UNSPECIFIED HEARING LOSS TYPE, UNSPECIFIED LATERALITY: Primary | ICD-10-CM

## 2017-12-21 NOTE — PROGRESS NOTES
Please note an Outpatient Complex Care Management welcome packet and consent form was created and mailed to the patient on 12/21/17.    Please contact [facility] at [phone] with any questions.    Thank you,    So White, SSC

## 2017-12-21 NOTE — LETTER
December 21, 2017    Katherine Hernandez  [address]  [place] [address]             Outpatient Case Management  [address]  [facility] [address] Dear Katherine Hernandez:    We understand that receiving many services from different doctors and healthcare providers is overwhelming. There are appointments to make, transportation to arrange, dietary instructions to understand, and new medications to obtain.    This is where [facility] can help.     You are eligible to receive Outpatient Case Management services when you have healthcare needs that require the coordination of many providers, treatments, and services. You also qualify if you need assistance with a new treatment plan.     There is no charge for this support. You may have been referred to this program from your doctor(s), hospital staff member(s), or insurance company but you always have a choice to participate or not participate. To participate, you must give us your permission to be enrolled.     When you are enrolled in the [facility] program, the  who is assigned to you is    Catina Carrasco RN  [phone]    Depending on your needs and wishes, your  may speak with you by phone, visit you at your place of living (for example your home, skilled nursing facility, or rehabilitation facility), or meet you at your doctors office.     Your  will tell you why you have been selected to participate in the program and will complete an assessment of your needs. Then a personalized plan of care will be developed with you and or your caregiver.             Here are examples of the services your [facility]  provides.     Coordinate communication among multiple providers.   Arrange for transportation, doctors visits, durable medical equipment, home care services, and special clinics.    Provide coaching on how to manage your health condition.    Answer  questions about your health condition.   Help you understand your doctors treatment plan.    Provide additional instruction about your health condition, treatments, and medications.    Help you obtain information about your insurance coverage.    Advocate for your individual needs.    Request a Licensed Clinical  (LCSW) to visit you if you need their services. LCSWs help with long term planning (discussing placement options, advanced planning directives), financial planning, and assistance (for example rent, utilities, medication funding).     Your  will coordinate their activities with other outpatient services you are receiving. All services provided by [facility]  are coordinated with and communicated to your primary care physician.    Our goal is to help you manage your health condition(s) safely within your living environment, whether that is your home or a medical facility. We want to help you function at the healthiest and highest level possible.     Sincerely,      Francisco Way MD  Medical Director    Enclosures:    Frequently Asked Questions  Patient Rights and Responsibilities   Reporting a Grievance or Complaint  Consent/Release of Information  Stamped Addressed Envelope                  Frequently Asked Questions about [facility]    What is [facility]?  Outpatient Case management is not Home healthcare services. [facility]  do not provide hands-on care. [facility]  will work with your doctor to arrange for home health services, if needed. Home health services have a limited duration and there are some restrictions as to who can get these services. There is no prescribed limit to the amount of time you receive [facility] services. [facility]  are not agents of your insurance company. However, [facility] Case  Managers can help you obtain information from your insurance company.     Who are the [facility] ?  [facility]  are Registered Nurses and Social Workers. It is important to remember that you and your  are a team that works together with your primary care physician to create your individualized plan of care. The ultimate goal of your care plan is to help you implement your doctors treatment plan and to help you function at the highest level of health possible.     What are my rights as a patient?  It is important for you to know and understand your rights and responsibilities while receiving services from the [facility] program. We have enclosed a complete description of your rights and responsibilities. You can help to make your care more effective when you understand your right and responsibilities.     What is needed to be enrolled in the program?  You are only enrolled in the [facility] Program when you give us your consent to participate. You will find enclosed a consent form. You are receiving this letter because you or your caregivers have given us a verbal consent to enroll you in [facility] Program. We ask that you sign and return the enclosed written consent in the stamped self-addressed envelope.                           Patient Rights and Responsibilities    We consider you a partner in your care. When you are well informed, participate in treatment decisions and communicate openly with your doctor and other healthcare professionals, you help make your care more effective.     While you are in the Outpatient Case Management Program, your rights include the following:     You have a right to be provided services in a non-discriminatory manner in accordance with the provisions of Title VI of the Civil Rights Act of 1964, Section 504 of the Rehabilitation Act of 1973, the Age  Discrimination Act of 1975, the Americans with Disabilities Act as well as any other applicable Federal and State laws and regulations.     You have the right to a reasonable, timely response to your request or need for care, as well as the right to considerate and respectful care including an environment that preserves dignity and contributes to a positive self-image. You are responsible for being considerate and respectful of our staff.     You have a right to information regarding patient rights, advocacy services and complaint mechanisms, and the right to prompt resolution of any complaint. You or a designee has the right to participate in the resolution of ethical issues surrounding your care. You have a right to file a complaint if you feel that your rights have been infringed, without fear or penalty from [facility] or the federal government. You may file a complaint with the Director of Outpatient Case Management by calling [phone]. At any time, you may lodge a grievance with the [facility] and [facility] by calling [phone], or the Joint Commission on Accreditation of Healthcare Organizations at [phone].     You, or someone acting on your behalf, have the right to understandable information on your health status, treatment and progress in order to make decisions. You have the right to know the nature, risks and alternatives to treatment. You have the right to be informed, when appropriate, regarding the outcome of the care that has been provided. You have the right to refuse treatment to the extent permitted by law, and the right to be informed of the alternatives and consequences of refusing treatment.     You, in collaboration with your physician, have the right to make decisions regarding care and the right to participate in the development and implementation of the plan of care and effective pain management. You have the right to know the name and professional status of  those responsible for the delivery of your care and treatment.       You have a right, within legal guidelines, to have a guardian, next-of-kin or legal designee exercises your patient rights when you are unable to do so. You have the right for your wishes regarding end-of-life decisions to be addressed by the healthcare team through advance directives. You have the right to personal privacy and confidentiality and to expect confidentiality of all records and communications pertaining to your care.      You have the right to receive communications about your health information confidentially. You have the right to request restrictions on the uses and disclosures of your health information. You have the right to inspect, copy, request amendments and receive an accounting of to whom we have disclosed your health information.     You have the right to be provided with interpretation services if you do not speak English; to alternative communication techniques if you are hearing or vision impaired; and to have any other resources needed on your behalf to ensure effective communication. These services are provided free of charge.     You have a right to personal safety (free from mental, physical, sexual and verbal abuse, neglect and exploitation). You have the right to access protective and advocacy services.     Advance Directives  A Patient Advocate is available to meet with patients to answer questions regarding advance directives.    Living Will  A document that outlines what medical treatment the patient does or does not want in the event the patient becomes unable to make those decisions at the appropriate time.    Durable Medical Power of   A document by which the patient designates an individual to be responsible for making medical decisions in the event the patient becomes unable to do so.    HIPAA Notice of Privacy Practices  Your medical information is governed by federal privacy laws. HIPAA  protects private medical information and how that information is disclosed. If you have a question regarding the HIPAA Notice of Privacy Practices, or if you believe your privacy rights have been violated, you may call our designated hotline at [phone].            Quality Improvement  Because we consistently strive to improve the care and service provided to our patients, we welcome your feedback. Your comments are an important part of our quality improvement process, as we like to know what we are doing right and which areas are in need of improvement. Our policy is to listen, be responsive and provide you with an appropriate and timely follow-up to your questions or concerns. Our goal is active patient and family involvement in all aspects of the care process.            Reporting a Grievance or Complaint    During your time with the [facility] team you may have a grievance or complaint with our services. Your Patients Bill of Rights gives patients, families, and caregivers the right to express concerns and grievances and the right to expect a reasonable and timely response.     Your presentation of your concerns is not viewed negatively. It is an opportunity for us to improve the quality of our care and services we provide to you.     You may report your concerns directly to your , or you can phone in a complaint to:     Director of Outpatient Case Management  [phone]    You may also send a complaint letter to:    Director of Outpatient Case Management Services  [address] [place] [id number]    Tell us the details of your complaint and provide us with a contact phone number so we can contact you to obtain additional information. We will return a call to you within two business days of our receipt of your complaint, and to request additional information as needed. If you choose to mail a letter, your complaint may take a few days longer to reach us.      All grievances will be addressed as quickly as possible. A grievance or complaint that involves situations or practices which place patients in immediate danger will be addressed as an urgent matter. We will work to resolve all other complaints within seven days of receipt. By that time, you will receive a phone call with either the resolution of your complaint, or a plan for corrective action. A formal written response will be sent to you within 30 days of receipt of your grievance.     If a resolution cannot be completed within 30 days, a letter will be sent to you or your family member with an estimated time for the final response.    Additionally, all patients have the right to file complaints with external agencies, without exception. Complaints/grievances can be addressed to the following agencies:            Patient Safety or Quality of Care Concerns  Office of Quality Monitoring   [facility] [place] [address]  [phone] Toll Free    HIPPA Privacy/Security Concerns  Office for Civil Rights Region IV  [place] Department of Health & Human Services  [address], [address]  [place] [address] [phone] Phone  [phone] TDD  [phone] Toll Free    Medicare/Medicaid Billing Concerns  [place] for Medicare & Medicaid Services  Region 6  [address], [address]  [place] [address] [phone] Phone  [phone] Toll Free    General Concerns  [place] [facility] ([facility])  [phone] Toll Free Complaint Hotline                                      Consent Form/Release of Information    By signing--     (1) I agree I have read the Outpatient Case Management information provided to me;     (2) I agree to voluntarily participate in the Outpatient Case Management program;     (3) I understand I must consent to participation in the Outpatient Case Management program during my first interview with my Case  Manager;    (4) I consent to the discussion and release of my personal health information to my healthcare team (including my personal physician, my medical home care team, any specialty physician(s), and my [facility] team);     (5) I agree my consent is valid for the length of time I am receiving Outpatient Case Management;    (6) I agree to referrals to community resources which my Case Management team recommends for me. I agree to the release of my personal information and personal health information as necessary to referral sources.    ___________________________________________________________________  Patients Printed Name     ___________________________________________________________________  Patients Signature       Date    If patient is in being cared for, please complete this section:     ___________________________________________________________________  Printed Name of Person Caring For Patient   Relationship To Patient    ___________________________________________________________________   Signature of Person Caring For Patient     Date    PLEASE SIGN AND RETURN IN THE ENCLOSED PRE-ADDRESSED ENVELOPE.

## 2017-12-27 ENCOUNTER — OUTPATIENT CASE MANAGEMENT (OUTPATIENT)
Dept: ADMINISTRATIVE | Facility: OTHER | Age: 82
End: 2017-12-27

## 2017-12-27 ENCOUNTER — OFFICE VISIT (OUTPATIENT)
Dept: INTERNAL MEDICINE | Facility: CLINIC | Age: 82
End: 2017-12-27
Payer: MEDICARE

## 2017-12-27 VITALS
SYSTOLIC BLOOD PRESSURE: 110 MMHG | BODY MASS INDEX: 31.24 KG/M2 | DIASTOLIC BLOOD PRESSURE: 60 MMHG | WEIGHT: 169.75 LBS | HEIGHT: 62 IN | HEART RATE: 84 BPM

## 2017-12-27 DIAGNOSIS — I10 ESSENTIAL HYPERTENSION: Primary | ICD-10-CM

## 2017-12-27 DIAGNOSIS — E66.9 CLASS 1 OBESITY WITH BODY MASS INDEX (BMI) OF 31.0 TO 31.9 IN ADULT, UNSPECIFIED OBESITY TYPE, UNSPECIFIED WHETHER SERIOUS COMORBIDITY PRESENT: ICD-10-CM

## 2017-12-27 DIAGNOSIS — N28.9 RENAL INSUFFICIENCY: ICD-10-CM

## 2017-12-27 DIAGNOSIS — I50.9 CONGESTIVE HEART FAILURE, UNSPECIFIED CONGESTIVE HEART FAILURE CHRONICITY, UNSPECIFIED CONGESTIVE HEART FAILURE TYPE: ICD-10-CM

## 2017-12-27 DIAGNOSIS — Z00.00 PREVENTATIVE HEALTH CARE: ICD-10-CM

## 2017-12-27 DIAGNOSIS — I48.91 ATRIAL FIBRILLATION WITH RVR: ICD-10-CM

## 2017-12-27 DIAGNOSIS — E87.1 HYPONATREMIA: ICD-10-CM

## 2017-12-27 DIAGNOSIS — E78.5 HYPERLIPIDEMIA, UNSPECIFIED HYPERLIPIDEMIA TYPE: ICD-10-CM

## 2017-12-27 PROCEDURE — 99999 PR PBB SHADOW E&M-EST. PATIENT-LVL IV: CPT | Mod: PBBFAC,,, | Performed by: INTERNAL MEDICINE

## 2017-12-27 PROCEDURE — 99499 UNLISTED E&M SERVICE: CPT | Mod: S$GLB,,, | Performed by: INTERNAL MEDICINE

## 2017-12-27 PROCEDURE — 99214 OFFICE O/P EST MOD 30 MIN: CPT | Mod: S$GLB,,, | Performed by: INTERNAL MEDICINE

## 2017-12-27 NOTE — PROGRESS NOTES
Attempted to update plan of care for OPCM; left voice message to return call.  PRASHANT Carrasco, OPCM-RN

## 2017-12-27 NOTE — PROGRESS NOTES
Pt. ID: Katherine Hernandez is a 86 y.o. female      Chief complaint:   Chief Complaint   Patient presents with    Hospital Follow Up       HPI: Pt. Here for f/u for A-fid and CHF; pt. Was placed on amiodorone, toprol, eliquis and digoxin; weight is elevated but stable; he is scheduled to see cardiology later this week; home health is seeing pt.; she is feeling better and cough/SOB have resolved; I reviewed labs dated 12/18/17; sodium is stable; kidney fxn is elevated but stable      Review of Systems   Constitutional: Negative for chills and fever.   Respiratory: Negative for cough and shortness of breath.    Cardiovascular: Negative for chest pain.   Gastrointestinal: Negative for abdominal pain, nausea and vomiting.   Genitourinary: Negative for dysuria.         Objective:    Physical Exam   Constitutional: She is oriented to person, place, and time.   obese   Eyes: EOM are normal.   Neck: Normal range of motion.   Cardiovascular: Normal rate, regular rhythm and normal heart sounds.    Pulmonary/Chest: Effort normal and breath sounds normal. No respiratory distress. She has no wheezes. She has no rales.   Abdominal: Soft. There is no tenderness. There is no rebound and no guarding.   Musculoskeletal: Normal range of motion.   Gait instability: uses walker   Neurological: She is alert and oriented to person, place, and time.   Skin: No rash noted.         Health Maintenance   Topic Date Due    Pneumococcal (65+) (2 of 2 - PCV13) 10/19/2018    Lipid Panel  06/01/2022    TETANUS VACCINE  10/19/2027    Zoster Vaccine  Completed    Influenza Vaccine  Completed         Assessment:     1. Essential hypertension Well controlled   2. Atrial fibrillation with RVR Active   3. Congestive heart failure, unspecified congestive heart failure chronicity, unspecified congestive heart failure type Stable   4. Hyponatremia Stable   5. Renal insufficiency Stable   6. Hyperlipidemia, unspecified hyperlipidemia type Active   7.  Class 1 obesity with body mass index (BMI) of 31.0 to 31.9 in adult, unspecified obesity type, unspecified whether serious comorbidity present Sub-optimally controlled   8. Preventative health care Active         Plan: Essential hypertension  Comments:  continue current regimen and encouraged low Na diet and weight loss  Orders:  -     CBC auto differential; Future; Expected date: 02/27/2018  -     Comprehensive metabolic panel; Future; Expected date: 02/27/2018  -     Urinalysis; Future; Expected date: 02/27/2018    Atrial fibrillation with RVR  Comments:  continue current regimen and f/u cardiology who is managing     Congestive heart failure, unspecified congestive heart failure chronicity, unspecified congestive heart failure type  Comments:  continue current regimen and f/u cardiology who is managing     Hyponatremia  Comments:  monitor  Orders:  -     Comprehensive metabolic panel; Future; Expected date: 02/27/2018    Renal insufficiency  Comments:  monitor  Orders:  -     Comprehensive metabolic panel; Future; Expected date: 02/27/2018    Hyperlipidemia, unspecified hyperlipidemia type  Comments:  continue current regimen and encouraged diet modification   Orders:  -     Lipid panel; Future; Expected date: 02/27/2018    Class 1 obesity with body mass index (BMI) of 31.0 to 31.9 in adult, unspecified obesity type, unspecified whether serious comorbidity present  Comments:  encouraged diet and explained risks     Preventative health care  -     CBC auto differential; Future; Expected date: 02/27/2018  -     Comprehensive metabolic panel; Future; Expected date: 02/27/2018  -     Lipid panel; Future; Expected date: 02/27/2018  -     Urinalysis; Future; Expected date: 02/27/2018        Problem List Items Addressed This Visit        Cardiac/Vascular    Hypertension - Primary    Relevant Orders    CBC auto differential    Comprehensive metabolic panel    Urinalysis    Hyperlipidemia    Relevant Orders    Lipid panel     Atrial fibrillation with RVR    Congestive heart failure       Renal/    Renal insufficiency    Relevant Orders    Comprehensive metabolic panel    Hyponatremia    Relevant Orders    Comprehensive metabolic panel       Endocrine    Class 1 obesity with body mass index (BMI) of 31.0 to 31.9 in adult      Other Visit Diagnoses     Preventative health care   (Active)      Relevant Orders    CBC auto differential    Comprehensive metabolic panel    Lipid panel    Urinalysis

## 2018-01-03 ENCOUNTER — OUTPATIENT CASE MANAGEMENT (OUTPATIENT)
Dept: ADMINISTRATIVE | Facility: OTHER | Age: 83
End: 2018-01-03

## 2018-01-03 NOTE — PROGRESS NOTES
Talked to daughter Anca to update plan of care for OPCM.  Anca stated that patient is taking all medications as prescribed.  Anca stated that patient did receive all educational materials via mail and has not had a change to review all of packet but plans on doing so this week.  Anca reports that patient is not had any SOB, cough or falls since last conversation.  Anca stated that she is being weighted daily and patient's weight is ranging from 165-166 lbs within the past week.  Educated Anca that if patient gains more than 3 lbs in a 24 hr period or 5 lbs in a week to call Dr. Gaspar and Anca verbalized understanding.  Anca reports that [facility] is visiting patient:  RN once a week and PT/OT twice a week.  Anca stated that patient is scheduled for a f/u appointment with Dr. Mensah (cardio) on 2/9/2018 at 0930.  Anca reports that she did receive a financial assistance application but has not completed at this time.  Anca stated that she plans on completing application within the next week.  Discussed with Anca that Dr. Gaspar did put in the audiology referral per [facility] and Anca stated that she will call scheduling to set up an Audiology appointment for her mother.  Anca also stated that patient is following a low sodium diet without difficulty.  Discussed with Anca that this RN will call Anca back on 1/10/2018 and verbalized understanding.  Encouraged Anca to call this RN if having any questions/concerns.  Will continue to follow.  PRASHANT Carrasco, OPCM-RN    Plan:    -Continue to educate about CHF. Review signs and symptoms of CHF flare up. Encourage patient to follow medication and treatment regimen. Encourage patient to maintain follow up with doctors.   -Continue to educate about fall prevention and safety in home setting. Encourage patient to follow medication and treatment regimen. Encourage patient to maintain follow up with doctors.  -Continue to educate about depression. Review signs and symptoms  of worsening of depression. Encourage patient to follow medication and treatment regimen. Encourage patient to maintain follow up with doctors.  -Follow up with financial assistance and audiology consult.      JARED AsherM-RN

## 2018-01-05 ENCOUNTER — TELEPHONE (OUTPATIENT)
Dept: INTERNAL MEDICINE | Facility: CLINIC | Age: 83
End: 2018-01-05

## 2018-01-10 ENCOUNTER — OUTPATIENT CASE MANAGEMENT (OUTPATIENT)
Dept: ADMINISTRATIVE | Facility: OTHER | Age: 83
End: 2018-01-10

## 2018-01-10 NOTE — PROGRESS NOTES
Attempted to update plan of care for OPCM; left voice message to return call.  PRASHANT Carrasco, OPCM-RN    []

## 2018-01-17 ENCOUNTER — PES CALL (OUTPATIENT)
Dept: ADMINISTRATIVE | Facility: CLINIC | Age: 83
End: 2018-01-17

## 2018-01-17 ENCOUNTER — OUTPATIENT CASE MANAGEMENT (OUTPATIENT)
Dept: ADMINISTRATIVE | Facility: OTHER | Age: 83
End: 2018-01-17

## 2018-01-17 NOTE — PROGRESS NOTES
Good afternoon. My name is Catina Carrasco OPCM-RN, I work for [facility]s Outpatient case management department with Dr. Gaspar.   I wanted to call and review your disaster plan with you. I also wanted to go over some crucial information and provide you with emergency phone numbers for your [place].   [x] Called patient, no answer, I left a message with the phone number for the [place] Office of Emergency Preparedness.   [] Please be sure to have a supply of water, non-perishable food items, flashlights and batteries with you in your house.   [] Please be sure you bring all of your medications with you. Bring at least a five day supply. It is best to bring your medication bottles, in case you are displaced for a longer period of time, therefore you can get your medication refilled from your temporary location.   [] Please bring sure to bring any DME that you may need. This includes walkers, wheelchairs, shower chairs, nebulizer machine, etc.   [] If you are a diabetic- be sure to bring your glucometer and all glucometer monitoring supplies.   [] If you have high blood pressure- be sure to bring your blood pressure cuff so you can continue to monitor.   [] If you have CHF-be sure to bring your scale so you can continue to monitor.  [] If on PEG feeding- be sure to bring tube feedings and feeding supplies.  [] If on oxygen- be sure to bring all of your oxygen supplies: Cannula, portable tanks, concentrator, etc. Also contact you Oxygen Supply Company to find out the nearest location of an oxygen supply company to where you will be located. ([employer]: [phone], [facility]: [phone], [facility] [employer]:[phone], [employer]: [phone]).   [] If you receive hemodialysis- you should already have a plan in place with your dialysis center. If you do not know where you need to evacuate to in order to be close to a dialysis center- reach out to your dialysis center for further direction. ([facility] service  line: [phone], [facility] service line [phone])  [] If receiving treatment at an infusion center- please contact the infusion center to find out which infusion center they have a contract with. Also ask your infusion center for location of the infusion center they are in contract with, so if need be you can evacuate to that area.   Office of Emergency Preparedness Phone number:  [] [facility]: [phone]  [] [facility]: [phone]  [] [place]: [phone]  [] [facility]: [phone]  [] [place] [facility]: [phone]  [] [facility]: [phone]  [] [facility]: [phone]  [] [facility]: [phone]  [] [facility]: [phone]  [] [facility]: [phone]  [] [facility]: [phone]  [] [place] [facility]: [phone]  [] [facility]: [phone]    [] [facility]:  [phone]   [] [facility]:  [phone]   [] [facility]:  [phone]   [] [facility]:  [phone]   [] [facility]:  [phone]   [] [facility]: [phone]   [] [facility]:  [phone]   [] [facility]:  [phone]   [] [facility]:  [phone]   [] [facility]:  [phone]   [] [facility]:  [phone]   [] [facility]:  [phone]   [] [facility]:  [phone]   [] [facility]:  [phone]   [] [facility]:  [phone]   [] [facility]: [phone]   [] [facility]:  [phone]   [] [facility]: [phone]   [] [facility]: [phone]  Attempted to discuss emergency preparedness with daughter Anca/patient.  Unable to reach and unable to leave voice message.  JARED AsherM-RN

## 2018-01-24 ENCOUNTER — OUTPATIENT CASE MANAGEMENT (OUTPATIENT)
Dept: ADMINISTRATIVE | Facility: OTHER | Age: 83
End: 2018-01-24

## 2018-01-24 NOTE — PROGRESS NOTES
Attempted to update plan of care for OPCM; unable to reach and unable to leave voice message.  PRASHANT Carrasco, OPCM-RN
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
## 2018-01-27 PROBLEM — I48.20 CHRONIC ATRIAL FIBRILLATION: Status: ACTIVE | Noted: 2017-12-14

## 2018-01-27 PROBLEM — E87.1 HYPONATREMIA: Status: RESOLVED | Noted: 2017-12-27 | Resolved: 2018-01-27

## 2018-01-27 PROBLEM — R79.89 ELEVATED TROPONIN: Status: RESOLVED | Noted: 2017-12-14 | Resolved: 2018-01-27

## 2018-01-27 PROBLEM — N18.30 CKD (CHRONIC KIDNEY DISEASE) STAGE 3, GFR 30-59 ML/MIN: Status: ACTIVE | Noted: 2017-12-27

## 2018-01-27 PROBLEM — I50.32 CHRONIC DIASTOLIC HEART FAILURE: Status: ACTIVE | Noted: 2017-12-14

## 2018-01-31 ENCOUNTER — TELEPHONE (OUTPATIENT)
Dept: FAMILY MEDICINE | Facility: CLINIC | Age: 83
End: 2018-01-31

## 2018-01-31 ENCOUNTER — OUTPATIENT CASE MANAGEMENT (OUTPATIENT)
Dept: ADMINISTRATIVE | Facility: OTHER | Age: 83
End: 2018-01-31

## 2018-01-31 ENCOUNTER — TELEPHONE (OUTPATIENT)
Dept: INTERNAL MEDICINE | Facility: CLINIC | Age: 83
End: 2018-01-31

## 2018-01-31 DIAGNOSIS — H91.90 HEARING LOSS, UNSPECIFIED HEARING LOSS TYPE, UNSPECIFIED LATERALITY: Primary | ICD-10-CM

## 2018-01-31 NOTE — PROGRESS NOTES
"Talked to daughter Anca to update plan of care for OPCM.  Anca reports that patient is taking all medications as prescribed.  Anca reports that patient did fall on 1/26/2018 and was hospitalized and d/c on 1/27/2018 for a fall.  Anca stated that patient fell on "her tailbone" and did not experienced any fractures.  Anca reports that on patient's buttocks there is a bruise but is healing.  Anca reports that patient does not have any SOB or cough at this time.  Anca stated that she is encouraging patient to drink a can of ensure a day to help increase her caloric intake.  Anca reports that her weight is 167.0 lbs.  Anca stated that she has not received an audiology consult for her mother. I will in-basket message Dr. Gaspar in regards to getting patient an audiology consult.  Anca also stated that no one from [employer] has contacted her for her mother.   I will send an in-basket message to financial assistance in order for F.A. To connect with Anca for her mother.  Reminded Anca of upcoming appointments:  2/9 at 0930 with Dr. Mensah (cardio) and for fasting lab work on 3/19 at 0715 and verbalized understanding. Anca also reports that [facility] is visiting patient:  RN once a week and PT/OT twice a week.  Encouraged Anca to call this RN if having any questions/concerns.  Discussed with Anca that this RN will her back to update plan of care on 2/8/2018 and verbalized understanding.  Also Anca requested that I call her back on her cell:  [phone].  Will continue to follow.  PRASHANT Carrasco OPCM-RN    Plan:  Continue to educate about CHF. Review signs and symptoms of CHF flare up. Encourage patient to follow medication and treatment regimen. Encourage patient to maintain follow up with doctors.   -Continue to educate about fall prevention and safety in home setting. Encourage patient to follow medication and treatment regimen. Encourage patient to maintain follow up with doctors.  -Continue to " educate about depression. Review signs and symptoms of worsening of depression. Encourage patient to follow medication and treatment regimen. Encourage patient to maintain follow up with doctors.  -Follow up with financial assistance and audiology consult.

## 2018-01-31 NOTE — TELEPHONE ENCOUNTER
Spoke with pts daughter Anca,informed Anca that I will forward the request to Dr Gaspar and I will call her back for plan & care. She voices understanding.

## 2018-01-31 NOTE — TELEPHONE ENCOUNTER
----- Message from Catina Carrasco RN sent at 1/31/2018 10:48 AM CST -----  Hi. This is Catina Carrasco RN. I am with the Outpatient case management team with [facility]. I received a referral on the above patient. I spoke with patient's daughter Anca today on the telephone.  Her cell is [phone].    Anca is requesting for an audiology referral and to have an appointment set up for patient.  If you are in agreement, please refer to audiology.      Please notify patient of your recommendations.     Thank you,  Catina Carrasco R.N.  Outpatient Complex Case Manager

## 2018-02-01 ENCOUNTER — PATIENT MESSAGE (OUTPATIENT)
Dept: INTERNAL MEDICINE | Facility: CLINIC | Age: 83
End: 2018-02-01

## 2018-02-01 PROBLEM — N19 UREMIA: Status: ACTIVE | Noted: 2018-02-01

## 2018-02-02 ENCOUNTER — TELEPHONE (OUTPATIENT)
Dept: FAMILY MEDICINE | Facility: CLINIC | Age: 83
End: 2018-02-02

## 2018-02-02 PROBLEM — E86.0 DEHYDRATION: Status: ACTIVE | Noted: 2018-02-02

## 2018-02-02 PROBLEM — I25.10 CAD (CORONARY ARTERY DISEASE): Status: ACTIVE | Noted: 2018-02-02

## 2018-02-02 PROBLEM — G93.41 ACUTE METABOLIC ENCEPHALOPATHY: Status: ACTIVE | Noted: 2018-02-02

## 2018-02-02 PROBLEM — S32.10XA CLOSED FRACTURE OF SACRUM: Status: ACTIVE | Noted: 2018-02-02

## 2018-02-02 PROBLEM — N17.9 AKI (ACUTE KIDNEY INJURY): Status: ACTIVE | Noted: 2018-02-02

## 2018-02-02 NOTE — TELEPHONE ENCOUNTER
----- Message from Kimberly Avalos sent at 2/1/2018  2:37 PM CST -----  Contact: daughter/Anca [phone]  Pt daughter requesting to speak with you concerning the pt orders for a bed.  Please call and advise

## 2018-02-04 PROBLEM — R33.9 URINARY RETENTION: Status: ACTIVE | Noted: 2018-02-04

## 2018-02-04 PROBLEM — E86.0 DEHYDRATION: Status: RESOLVED | Noted: 2018-02-02 | Resolved: 2018-02-04

## 2018-02-04 PROBLEM — N17.9 AKI (ACUTE KIDNEY INJURY): Status: RESOLVED | Noted: 2018-02-02 | Resolved: 2018-02-04

## 2018-02-04 PROBLEM — N39.0 UTI (URINARY TRACT INFECTION): Status: ACTIVE | Noted: 2018-02-04

## 2018-02-05 PROBLEM — E87.6 HYPOKALEMIA: Status: ACTIVE | Noted: 2018-02-05

## 2018-02-05 PROBLEM — R13.10 DYSPHAGIA: Status: ACTIVE | Noted: 2018-02-05

## 2018-02-06 PROBLEM — E87.1 HYPONATREMIA: Status: RESOLVED | Noted: 2017-12-27 | Resolved: 2018-02-06

## 2018-02-06 PROBLEM — N17.9 AKI (ACUTE KIDNEY INJURY): Status: RESOLVED | Noted: 2018-02-02 | Resolved: 2018-02-06

## 2018-02-06 PROBLEM — R33.9 URINARY RETENTION: Status: RESOLVED | Noted: 2018-02-04 | Resolved: 2018-02-06

## 2018-02-06 PROBLEM — G93.41 ACUTE METABOLIC ENCEPHALOPATHY: Status: RESOLVED | Noted: 2018-02-02 | Resolved: 2018-02-06

## 2018-02-06 PROBLEM — N19 UREMIA: Status: RESOLVED | Noted: 2018-02-01 | Resolved: 2018-02-06

## 2018-02-07 ENCOUNTER — PATIENT OUTREACH (OUTPATIENT)
Dept: ADMINISTRATIVE | Facility: CLINIC | Age: 83
End: 2018-02-07

## 2018-02-09 ENCOUNTER — TELEPHONE (OUTPATIENT)
Dept: INTERNAL MEDICINE | Facility: CLINIC | Age: 83
End: 2018-02-09

## 2018-02-09 NOTE — TELEPHONE ENCOUNTER
Hospital bed ordered from [facility]; please fax back; home health re certification signed from [facility]

## 2018-02-14 ENCOUNTER — OUTPATIENT CASE MANAGEMENT (OUTPATIENT)
Dept: ADMINISTRATIVE | Facility: OTHER | Age: 83
End: 2018-02-14

## 2018-02-22 ENCOUNTER — OUTPATIENT CASE MANAGEMENT (OUTPATIENT)
Dept: ADMINISTRATIVE | Facility: OTHER | Age: 83
End: 2018-02-22

## 2018-02-22 NOTE — PROGRESS NOTES
Talked to daughter Anca to update plan of care for OPCM.  Anca stated that patient was in hospital at [facility] from 2/1/2018-2/6/2018 for dx of acute metabolic encephalopathy and dysphagia.  [facility], patient was transferred to [facility] and anticipated d/c date to home will be on 2/26/2018.  Discussed with Anca that this RN will follow up with Anca on 2/26/2018.  Also this RN will be mailing Anca some Ensure coupons to help patient with cost of Ensure.  Encouraged Anca to call this RN if having any questions/concerns.  PRASHANT Carrasco, OPCM-RN

## 2018-02-26 ENCOUNTER — OUTPATIENT CASE MANAGEMENT (OUTPATIENT)
Dept: ADMINISTRATIVE | Facility: OTHER | Age: 83
End: 2018-02-26

## 2018-03-06 ENCOUNTER — OUTPATIENT CASE MANAGEMENT (OUTPATIENT)
Dept: ADMINISTRATIVE | Facility: OTHER | Age: 83
End: 2018-03-06

## 2018-03-06 ENCOUNTER — TELEPHONE (OUTPATIENT)
Dept: FAMILY MEDICINE | Facility: CLINIC | Age: 83
End: 2018-03-06

## 2018-03-06 NOTE — TELEPHONE ENCOUNTER
----- Message from Sarah Solano sent at 3/6/2018 10:27 AM CST -----  Contact: [facility] [phone]  Home health nurse is calling to talk to you concerning patient home health services. Please advice

## 2018-03-06 NOTE — PROGRESS NOTES
Talked to Anca-daughter to update plan of care for OPCM.  Anca stated that patient is not at home as of yet but will be d/c from [facility] sometime today.  Anca asked this RN to call Anca back on 3/8/2018 in order to update plan of care.  PRASHANT Carrasco, OPCM-RN

## 2018-03-07 ENCOUNTER — TELEPHONE (OUTPATIENT)
Dept: INTERNAL MEDICINE | Facility: CLINIC | Age: 83
End: 2018-03-07

## 2018-03-07 DIAGNOSIS — R06.2 WHEEZING: Primary | ICD-10-CM

## 2018-03-07 RX ORDER — ALBUTEROL SULFATE 90 UG/1
2 AEROSOL, METERED RESPIRATORY (INHALATION) EVERY 6 HOURS PRN
Qty: 1 INHALER | Refills: 0 | Status: SHIPPED | OUTPATIENT
Start: 2018-03-07 | End: 2018-06-08

## 2018-03-07 NOTE — TELEPHONE ENCOUNTER
Spoke with [facility] nurse and informed of MD recommendations and verbalized understanding.

## 2018-03-08 ENCOUNTER — OUTPATIENT CASE MANAGEMENT (OUTPATIENT)
Dept: ADMINISTRATIVE | Facility: OTHER | Age: 83
End: 2018-03-08

## 2018-03-08 NOTE — PROGRESS NOTES
Talked to Anca, patient's daughter, to update plan of care for OPCM.  Anca asked that this RN call back at a later date and time.  Letter sent.  PRASHANT Carrasco, OPCM-RN

## 2018-03-08 NOTE — LETTER
March 8, 2018    Katherine Hernandez   [address]  [facility] [address]             [facility] [address] [place] [address] Dear Katherine,    I work with [facility] Outpatient Case Management Department. I have been unsuccessful at reaching you to follow-up to see how you have been doing. If you require any future assistance or if any new concerns or problems arise, please do not hesitate to call.     The Outpatient Case Management Department can be reached at [phone] from 8:00AM to 4:30 PM on Monday thru Friday. [facility] also has a program where a nurse is available 24/7 to answer questions or provide medical advice, their number is [phone].    Thanks,      Catina Carrasco, RN  Outpatient Case Management

## 2018-03-09 ENCOUNTER — PATIENT MESSAGE (OUTPATIENT)
Dept: INTERNAL MEDICINE | Facility: CLINIC | Age: 83
End: 2018-03-09

## 2018-03-09 ENCOUNTER — TELEPHONE (OUTPATIENT)
Dept: INTERNAL MEDICINE | Facility: CLINIC | Age: 83
End: 2018-03-09

## 2018-03-09 NOTE — TELEPHONE ENCOUNTER
Notify pt. [facility] sent me a message stating pt. Could not swallow, please clarify with home health what meds pt. Needs and cannot swallow; is the pt. Having difficulty swallowing liquids and solids? If she cannot swallow food, liquids and meds, she will need to be evaluated in ER.

## 2018-03-12 NOTE — TELEPHONE ENCOUNTER
Spoke with Carolyn ([facility]) requesting Medication list for Ms David, also she mentioned that pt medication needed to be crushed due to trouble swallowing. Informed Carolyn, per Dr Gaspar, pt need to come in to be evaluated before releasing any order form. Carolyn states she will contact family to schedule an appt.

## 2018-03-13 ENCOUNTER — TELEPHONE (OUTPATIENT)
Dept: INTERNAL MEDICINE | Facility: CLINIC | Age: 83
End: 2018-03-13

## 2018-03-13 NOTE — TELEPHONE ENCOUNTER
Notify home health that they can crush pt. meds until she is evaluated in my office; please provide med list if needed by home health; home health order signed; please send back and copy to chart

## 2018-03-15 ENCOUNTER — OUTPATIENT CASE MANAGEMENT (OUTPATIENT)
Dept: ADMINISTRATIVE | Facility: OTHER | Age: 83
End: 2018-03-15

## 2018-03-15 NOTE — PROGRESS NOTES
Third attempt to update plan of care for OPCM; unable to reach and unable to leave voice message.  Will dis-enroll patient at this time.  Case closed.  PRASHANT Carrasco, OPCM-RN

## 2018-03-19 ENCOUNTER — LAB VISIT (OUTPATIENT)
Dept: LAB | Facility: HOSPITAL | Age: 83
End: 2018-03-19
Attending: INTERNAL MEDICINE
Payer: MEDICARE

## 2018-03-19 DIAGNOSIS — Z00.00 PREVENTATIVE HEALTH CARE: ICD-10-CM

## 2018-03-19 DIAGNOSIS — N28.9 RENAL INSUFFICIENCY: ICD-10-CM

## 2018-03-19 DIAGNOSIS — E78.5 HYPERLIPIDEMIA, UNSPECIFIED HYPERLIPIDEMIA TYPE: ICD-10-CM

## 2018-03-19 DIAGNOSIS — I10 ESSENTIAL HYPERTENSION: ICD-10-CM

## 2018-03-19 DIAGNOSIS — E87.1 HYPONATREMIA: ICD-10-CM

## 2018-03-19 LAB
ALBUMIN SERPL BCP-MCNC: 3 G/DL
ALP SERPL-CCNC: 118 U/L
ALT SERPL W/O P-5'-P-CCNC: 35 U/L
ANION GAP SERPL CALC-SCNC: 14 MMOL/L
AST SERPL-CCNC: 36 U/L
BASOPHILS # BLD AUTO: 0.02 K/UL
BASOPHILS NFR BLD: 0.2 %
BILIRUB SERPL-MCNC: 0.6 MG/DL
BUN SERPL-MCNC: 34 MG/DL
CALCIUM SERPL-MCNC: 9.2 MG/DL
CHLORIDE SERPL-SCNC: 94 MMOL/L
CHOLEST SERPL-MCNC: 163 MG/DL
CHOLEST/HDLC SERPL: 2.4 {RATIO}
CO2 SERPL-SCNC: 27 MMOL/L
CREAT SERPL-MCNC: 1.9 MG/DL
DIFFERENTIAL METHOD: ABNORMAL
EOSINOPHIL # BLD AUTO: 0.4 K/UL
EOSINOPHIL NFR BLD: 3.2 %
ERYTHROCYTE [DISTWIDTH] IN BLOOD BY AUTOMATED COUNT: 15.9 %
EST. GFR  (AFRICAN AMERICAN): 27.1 ML/MIN/1.73 M^2
EST. GFR  (NON AFRICAN AMERICAN): 23.5 ML/MIN/1.73 M^2
GLUCOSE SERPL-MCNC: 116 MG/DL
HCT VFR BLD AUTO: 32.8 %
HDLC SERPL-MCNC: 68 MG/DL
HDLC SERPL: 41.7 %
HGB BLD-MCNC: 10.2 G/DL
IMM GRANULOCYTES # BLD AUTO: 0.06 K/UL
IMM GRANULOCYTES NFR BLD AUTO: 0.6 %
LDLC SERPL CALC-MCNC: 82.6 MG/DL
LYMPHOCYTES # BLD AUTO: 1.6 K/UL
LYMPHOCYTES NFR BLD: 14.9 %
MCH RBC QN AUTO: 30.7 PG
MCHC RBC AUTO-ENTMCNC: 31.1 G/DL
MCV RBC AUTO: 99 FL
MONOCYTES # BLD AUTO: 0.8 K/UL
MONOCYTES NFR BLD: 7.4 %
NEUTROPHILS # BLD AUTO: 8 K/UL
NEUTROPHILS NFR BLD: 73.7 %
NONHDLC SERPL-MCNC: 95 MG/DL
NRBC BLD-RTO: 0 /100 WBC
PLATELET # BLD AUTO: 391 K/UL
PMV BLD AUTO: 9.9 FL
POTASSIUM SERPL-SCNC: 4 MMOL/L
PROT SERPL-MCNC: 6.8 G/DL
RBC # BLD AUTO: 3.32 M/UL
SODIUM SERPL-SCNC: 135 MMOL/L
TRIGL SERPL-MCNC: 62 MG/DL
WBC # BLD AUTO: 10.86 K/UL

## 2018-03-19 PROCEDURE — 80053 COMPREHEN METABOLIC PANEL: CPT

## 2018-03-19 PROCEDURE — 36415 COLL VENOUS BLD VENIPUNCTURE: CPT | Mod: PO

## 2018-03-19 PROCEDURE — 85025 COMPLETE CBC W/AUTO DIFF WBC: CPT

## 2018-03-19 PROCEDURE — 80061 LIPID PANEL: CPT

## 2018-03-20 ENCOUNTER — OFFICE VISIT (OUTPATIENT)
Dept: INTERNAL MEDICINE | Facility: CLINIC | Age: 83
End: 2018-03-20
Payer: MEDICARE

## 2018-03-20 VITALS
OXYGEN SATURATION: 95 % | HEIGHT: 62 IN | DIASTOLIC BLOOD PRESSURE: 64 MMHG | HEART RATE: 63 BPM | SYSTOLIC BLOOD PRESSURE: 144 MMHG

## 2018-03-20 DIAGNOSIS — D75.839 THROMBOCYTOSIS: ICD-10-CM

## 2018-03-20 DIAGNOSIS — I10 ESSENTIAL HYPERTENSION: Primary | ICD-10-CM

## 2018-03-20 DIAGNOSIS — E87.1 HYPONATREMIA: ICD-10-CM

## 2018-03-20 DIAGNOSIS — N18.4 CHRONIC RENAL INSUFFICIENCY, STAGE 4 (SEVERE): ICD-10-CM

## 2018-03-20 DIAGNOSIS — D64.9 ANEMIA, UNSPECIFIED TYPE: ICD-10-CM

## 2018-03-20 DIAGNOSIS — N39.0 URINARY TRACT INFECTION WITHOUT HEMATURIA, SITE UNSPECIFIED: ICD-10-CM

## 2018-03-20 DIAGNOSIS — R26.81 GAIT INSTABILITY: ICD-10-CM

## 2018-03-20 DIAGNOSIS — I48.91 ATRIAL FIBRILLATION, UNSPECIFIED TYPE: ICD-10-CM

## 2018-03-20 PROCEDURE — 99499 UNLISTED E&M SERVICE: CPT | Mod: S$GLB,,, | Performed by: INTERNAL MEDICINE

## 2018-03-20 PROCEDURE — 99999 PR PBB SHADOW E&M-EST. PATIENT-LVL V: CPT | Mod: PBBFAC,,, | Performed by: INTERNAL MEDICINE

## 2018-03-20 PROCEDURE — 99214 OFFICE O/P EST MOD 30 MIN: CPT | Mod: S$GLB,,, | Performed by: INTERNAL MEDICINE

## 2018-03-20 RX ORDER — FUROSEMIDE 20 MG/1
20 TABLET ORAL DAILY
Qty: 30 TABLET | Refills: 1 | Status: SHIPPED | OUTPATIENT
Start: 2018-03-20 | End: 2018-04-18 | Stop reason: SDUPTHER

## 2018-03-20 RX ORDER — NITROFURANTOIN 25; 75 MG/1; MG/1
100 CAPSULE ORAL 2 TIMES DAILY
Qty: 14 CAPSULE | Refills: 0 | Status: ON HOLD | OUTPATIENT
Start: 2018-03-20 | End: 2018-03-29 | Stop reason: HOSPADM

## 2018-03-20 NOTE — PROGRESS NOTES
Pt. ID: Katherine Hernandez is a 86 y.o. female      Chief complaint:   Chief Complaint   Patient presents with    Extremity Weakness     wounds and easy bruising to both legs       HPI: Pt. Here for f/u for HTN; I reviewed labs dated 3/19/18; pt. Has signs of UTI; anemia is stable and platelets are elevated but stable; kidney fxn went up and she is on lasix 40 mg QD; I will decrease to 20 mg QD; sodium is mildly low; pt. Is wheelchair bound and is being seen by home health and is getting PT as well as wound care; she is scheduled to see cardiology for A-fib; BP is borderline elevated      Review of Systems   Constitutional: Negative for chills and fever.   Respiratory: Negative for cough and shortness of breath.    Cardiovascular: Negative for chest pain.   Gastrointestinal: Negative for abdominal pain, nausea and vomiting.   Musculoskeletal:        Gait instability: wheelchair bound         Objective:    Physical Exam   Constitutional: She is oriented to person, place, and time.   frail   Eyes: EOM are normal.   Neck: Normal range of motion.   Cardiovascular: Normal rate, regular rhythm and normal heart sounds.    Pulmonary/Chest: Effort normal and breath sounds normal.   Abdominal: Soft. There is no tenderness. There is no rebound and no guarding.   Musculoskeletal:   Gait instability: wheelchair bound   Neurological: She is alert and oriented to person, place, and time.   Skin: No rash noted.   Lower extremity wounds dressed         Health Maintenance   Topic Date Due    Pneumococcal (65+) (2 of 2 - PCV13) 10/19/2018    Lipid Panel  03/19/2023    TETANUS VACCINE  10/19/2027    Zoster Vaccine  Completed    Influenza Vaccine  Completed         Assessment:     1. Essential hypertension Active   2. Chronic renal insufficiency, stage 4 (severe) Active   3. Urinary tract infection without hematuria, site unspecified Active   4. Thrombocytosis Stable   5. Anemia, unspecified type Stable   6. Hyponatremia Active    7. Atrial fibrillation, unspecified type Active   8. Gait instability Active         Plan: Essential hypertension  Comments:  continue current regimen and encouraged low Na diet   Orders:  -     CBC auto differential; Future; Expected date: 03/20/2018  -     Comprehensive metabolic panel; Future; Expected date: 03/20/2018  -     Urinalysis; Future; Expected date: 03/20/2018    Chronic renal insufficiency, stage 4 (severe)  Comments:  decrease lasix to 20 mg QD and re-evaluate in 3 weeks; avoid NSAIDs and refer to nephrology for further evaluation   Orders:  -     furosemide (LASIX) 20 MG tablet; Take 1 tablet (20 mg total) by mouth once daily.  Dispense: 30 tablet; Refill: 1  -     Ambulatory consult to Nephrology  -     Comprehensive metabolic panel; Future; Expected date: 03/20/2018    Urinary tract infection without hematuria, site unspecified  Comments:  start macrobid and repeat U/A prior to 3 week appt.   Orders:  -     nitrofurantoin, macrocrystal-monohydrate, (MACROBID) 100 MG capsule; Take 1 capsule (100 mg total) by mouth 2 (two) times daily.  Dispense: 14 capsule; Refill: 0  -     Urinalysis; Future; Expected date: 03/20/2018    Thrombocytosis  Comments:  monitor   Orders:  -     CBC auto differential; Future; Expected date: 03/20/2018    Anemia, unspecified type  Comments:  monitor   Orders:  -     CBC auto differential; Future; Expected date: 03/20/2018  -     Ferritin; Future; Expected date: 03/20/2018    Hyponatremia  Comments:  repeat Na prior to 3 week appt.   Orders:  -     Comprehensive metabolic panel; Future; Expected date: 03/20/2018    Atrial fibrillation, unspecified type  Comments:  continue current regimen and f/u cardiology as scheduled     Gait instability  Comments:  continue home health with PT and wound care        Problem List Items Addressed This Visit        Cardiac/Vascular    Hypertension - Primary    Relevant Orders    CBC auto differential    Comprehensive metabolic panel     Urinalysis    Atrial fibrillation       Renal/    UTI (urinary tract infection)    Relevant Medications    nitrofurantoin, macrocrystal-monohydrate, (MACROBID) 100 MG capsule    Other Relevant Orders    Urinalysis    Hyponatremia    Relevant Orders    Comprehensive metabolic panel    Chronic renal insufficiency, stage 4 (severe)    Overview     decrease lasix to 20 mg QD and re-evaluate in 3 weeks; avoid NSAIDs and refer to nephrology for further evaluation          Relevant Medications    furosemide (LASIX) 20 MG tablet    Other Relevant Orders    Ambulatory consult to Nephrology    Comprehensive metabolic panel       Oncology    Anemia    Overview     monitor          Relevant Orders    CBC auto differential    Ferritin    Thrombocytosis    Overview     monitor          Relevant Orders    CBC auto differential       Other    Gait instability

## 2018-03-25 PROBLEM — R00.1 BRADYCARDIA: Status: ACTIVE | Noted: 2018-03-25

## 2018-03-26 PROBLEM — E87.6 HYPOKALEMIA: Status: RESOLVED | Noted: 2018-02-05 | Resolved: 2018-03-26

## 2018-03-26 PROBLEM — L89.302 DECUBITUS ULCER OF BUTTOCK, STAGE 2: Status: ACTIVE | Noted: 2018-03-26

## 2018-03-26 PROBLEM — D75.839 THROMBOCYTOSIS: Status: RESOLVED | Noted: 2018-03-20 | Resolved: 2018-03-26

## 2018-03-26 PROBLEM — I48.0 PAROXYSMAL ATRIAL FIBRILLATION: Status: ACTIVE | Noted: 2017-12-14

## 2018-03-26 PROBLEM — R53.81 PHYSICAL DECONDITIONING: Status: ACTIVE | Noted: 2018-03-26

## 2018-03-26 PROBLEM — R79.89 ELEVATED TROPONIN: Status: RESOLVED | Noted: 2017-12-14 | Resolved: 2018-03-26

## 2018-03-27 PROBLEM — I48.0 PAROXYSMAL ATRIAL FIBRILLATION: Chronic | Status: ACTIVE | Noted: 2017-12-14

## 2018-03-27 PROBLEM — K21.9 GERD (GASTROESOPHAGEAL REFLUX DISEASE): Chronic | Status: ACTIVE | Noted: 2018-03-27

## 2018-03-27 PROBLEM — I50.32 CHRONIC DIASTOLIC HEART FAILURE: Chronic | Status: ACTIVE | Noted: 2017-12-14

## 2018-03-27 PROBLEM — D63.8 ANEMIA OF CHRONIC DISEASE: Chronic | Status: ACTIVE | Noted: 2018-03-27

## 2018-03-27 PROBLEM — N18.30 CKD (CHRONIC KIDNEY DISEASE) STAGE 3, GFR 30-59 ML/MIN: Chronic | Status: ACTIVE | Noted: 2017-12-27

## 2018-03-27 PROBLEM — R00.1 SYMPTOMATIC BRADYCARDIA: Status: ACTIVE | Noted: 2018-03-27

## 2018-03-27 PROBLEM — F32.A DEPRESSION: Chronic | Status: ACTIVE | Noted: 2017-03-03

## 2018-03-27 PROBLEM — E78.5 HYPERLIPIDEMIA: Chronic | Status: ACTIVE | Noted: 2017-04-19

## 2018-03-27 PROBLEM — Z79.01 CHRONIC ANTICOAGULATION: Chronic | Status: ACTIVE | Noted: 2018-03-27

## 2018-03-27 PROBLEM — D63.8 ANEMIA OF CHRONIC DISEASE: Status: ACTIVE | Noted: 2018-03-27

## 2018-03-28 ENCOUNTER — TELEPHONE (OUTPATIENT)
Dept: INTERNAL MEDICINE | Facility: CLINIC | Age: 83
End: 2018-03-28

## 2018-03-28 ENCOUNTER — HOSPITAL ENCOUNTER (INPATIENT)
Facility: HOSPITAL | Age: 83
LOS: 1 days | Discharge: HOME HEALTH | DRG: 242 | End: 2018-03-29
Attending: INTERNAL MEDICINE | Admitting: INTERNAL MEDICINE
Payer: MEDICARE

## 2018-03-28 DIAGNOSIS — R00.1 SYMPTOMATIC BRADYCARDIA: Primary | ICD-10-CM

## 2018-03-28 DIAGNOSIS — I10 ESSENTIAL (PRIMARY) HYPERTENSION: ICD-10-CM

## 2018-03-28 PROBLEM — E43 SEVERE MALNUTRITION: Status: ACTIVE | Noted: 2018-03-28

## 2018-03-28 PROBLEM — E43 SEVERE MALNUTRITION: Chronic | Status: ACTIVE | Noted: 2018-03-28

## 2018-03-28 PROBLEM — R53.81 PHYSICAL DECONDITIONING: Chronic | Status: ACTIVE | Noted: 2018-03-26

## 2018-03-28 LAB
ALBUMIN SERPL BCP-MCNC: 2.4 G/DL
ALP SERPL-CCNC: 95 U/L
ALT SERPL W/O P-5'-P-CCNC: 22 U/L
ANION GAP SERPL CALC-SCNC: 10 MMOL/L
ANION GAP SERPL CALC-SCNC: 11 MMOL/L
APTT BLDCRRT: 30.8 SEC
AST SERPL-CCNC: 24 U/L
BASOPHILS # BLD AUTO: 0.01 K/UL
BASOPHILS # BLD AUTO: 0.02 K/UL
BASOPHILS NFR BLD: 0.1 %
BASOPHILS NFR BLD: 0.2 %
BILIRUB SERPL-MCNC: 0.7 MG/DL
BUN SERPL-MCNC: 16 MG/DL
BUN SERPL-MCNC: 17 MG/DL
CALCIUM SERPL-MCNC: 8.4 MG/DL
CALCIUM SERPL-MCNC: 8.6 MG/DL
CHLORIDE SERPL-SCNC: 98 MMOL/L
CHLORIDE SERPL-SCNC: 98 MMOL/L
CO2 SERPL-SCNC: 27 MMOL/L
CO2 SERPL-SCNC: 27 MMOL/L
CREAT SERPL-MCNC: 1.2 MG/DL
CREAT SERPL-MCNC: 1.3 MG/DL
DIFFERENTIAL METHOD: ABNORMAL
DIFFERENTIAL METHOD: ABNORMAL
EOSINOPHIL # BLD AUTO: 0.3 K/UL
EOSINOPHIL # BLD AUTO: 0.4 K/UL
EOSINOPHIL NFR BLD: 2.9 %
EOSINOPHIL NFR BLD: 3.1 %
ERYTHROCYTE [DISTWIDTH] IN BLOOD BY AUTOMATED COUNT: 15.4 %
ERYTHROCYTE [DISTWIDTH] IN BLOOD BY AUTOMATED COUNT: 15.4 %
EST. GFR  (AFRICAN AMERICAN): 43 ML/MIN/1.73 M^2
EST. GFR  (AFRICAN AMERICAN): 47 ML/MIN/1.73 M^2
EST. GFR  (NON AFRICAN AMERICAN): 37 ML/MIN/1.73 M^2
EST. GFR  (NON AFRICAN AMERICAN): 41 ML/MIN/1.73 M^2
GLUCOSE SERPL-MCNC: 77 MG/DL
GLUCOSE SERPL-MCNC: 79 MG/DL
HCT VFR BLD AUTO: 31.9 %
HCT VFR BLD AUTO: 32 %
HGB BLD-MCNC: 10.2 G/DL
HGB BLD-MCNC: 10.3 G/DL
INR PPP: 1.2
LYMPHOCYTES # BLD AUTO: 0.9 K/UL
LYMPHOCYTES # BLD AUTO: 1.1 K/UL
LYMPHOCYTES NFR BLD: 9 %
LYMPHOCYTES NFR BLD: 9.7 %
MAGNESIUM SERPL-MCNC: 2 MG/DL
MCH RBC QN AUTO: 30.9 PG
MCH RBC QN AUTO: 31 PG
MCHC RBC AUTO-ENTMCNC: 32 G/DL
MCHC RBC AUTO-ENTMCNC: 32.2 G/DL
MCV RBC AUTO: 96 FL
MCV RBC AUTO: 97 FL
MONOCYTES # BLD AUTO: 0.9 K/UL
MONOCYTES # BLD AUTO: 1.1 K/UL
MONOCYTES NFR BLD: 8.9 %
MONOCYTES NFR BLD: 9.7 %
NEUTROPHILS # BLD AUTO: 7.9 K/UL
NEUTROPHILS # BLD AUTO: 8.6 K/UL
NEUTROPHILS NFR BLD: 77.4 %
NEUTROPHILS NFR BLD: 79 %
PHOSPHATE SERPL-MCNC: 3.6 MG/DL
PLATELET # BLD AUTO: 312 K/UL
PLATELET # BLD AUTO: 323 K/UL
PMV BLD AUTO: 9 FL
PMV BLD AUTO: 9.2 FL
POTASSIUM SERPL-SCNC: 4.2 MMOL/L
POTASSIUM SERPL-SCNC: 4.4 MMOL/L
PROT SERPL-MCNC: 6 G/DL
PROTHROMBIN TIME: 12.2 SEC
RBC # BLD AUTO: 3.3 M/UL
RBC # BLD AUTO: 3.32 M/UL
SODIUM SERPL-SCNC: 135 MMOL/L
SODIUM SERPL-SCNC: 136 MMOL/L
WBC # BLD AUTO: 11.18 K/UL
WBC # BLD AUTO: 9.94 K/UL

## 2018-03-28 PROCEDURE — 25000003 PHARM REV CODE 250

## 2018-03-28 PROCEDURE — 0JH606Z INSERTION OF PACEMAKER, DUAL CHAMBER INTO CHEST SUBCUTANEOUS TISSUE AND FASCIA, OPEN APPROACH: ICD-10-PCS | Performed by: INTERNAL MEDICINE

## 2018-03-28 PROCEDURE — A4216 STERILE WATER/SALINE, 10 ML: HCPCS | Performed by: HOSPITALIST

## 2018-03-28 PROCEDURE — 80053 COMPREHEN METABOLIC PANEL: CPT

## 2018-03-28 PROCEDURE — 25000003 PHARM REV CODE 250: Performed by: HOSPITALIST

## 2018-03-28 PROCEDURE — 99152 MOD SED SAME PHYS/QHP 5/>YRS: CPT | Mod: ,,, | Performed by: INTERNAL MEDICINE

## 2018-03-28 PROCEDURE — 83735 ASSAY OF MAGNESIUM: CPT

## 2018-03-28 PROCEDURE — 84100 ASSAY OF PHOSPHORUS: CPT

## 2018-03-28 PROCEDURE — 25000003 PHARM REV CODE 250: Performed by: INTERNAL MEDICINE

## 2018-03-28 PROCEDURE — 21400001 HC TELEMETRY ROOM

## 2018-03-28 PROCEDURE — 36415 COLL VENOUS BLD VENIPUNCTURE: CPT

## 2018-03-28 PROCEDURE — C1898 LEAD, PMKR, OTHER THAN TRANS: HCPCS

## 2018-03-28 PROCEDURE — 33208 INSRT HEART PM ATRIAL & VENT: CPT | Mod: KX,,, | Performed by: INTERNAL MEDICINE

## 2018-03-28 PROCEDURE — 63600175 PHARM REV CODE 636 W HCPCS

## 2018-03-28 PROCEDURE — 99223 1ST HOSP IP/OBS HIGH 75: CPT | Mod: ,,, | Performed by: INTERNAL MEDICINE

## 2018-03-28 PROCEDURE — 80048 BASIC METABOLIC PNL TOTAL CA: CPT

## 2018-03-28 PROCEDURE — 94761 N-INVAS EAR/PLS OXIMETRY MLT: CPT

## 2018-03-28 PROCEDURE — 93010 ELECTROCARDIOGRAM REPORT: CPT | Mod: ,,, | Performed by: INTERNAL MEDICINE

## 2018-03-28 PROCEDURE — 85610 PROTHROMBIN TIME: CPT

## 2018-03-28 PROCEDURE — 85730 THROMBOPLASTIN TIME PARTIAL: CPT

## 2018-03-28 PROCEDURE — 85025 COMPLETE CBC W/AUTO DIFF WBC: CPT

## 2018-03-28 PROCEDURE — 02HK3JZ INSERTION OF PACEMAKER LEAD INTO RIGHT VENTRICLE, PERCUTANEOUS APPROACH: ICD-10-PCS | Performed by: INTERNAL MEDICINE

## 2018-03-28 PROCEDURE — 02H63JZ INSERTION OF PACEMAKER LEAD INTO RIGHT ATRIUM, PERCUTANEOUS APPROACH: ICD-10-PCS | Performed by: INTERNAL MEDICINE

## 2018-03-28 RX ORDER — ONDANSETRON 2 MG/ML
8 INJECTION INTRAMUSCULAR; INTRAVENOUS EVERY 8 HOURS PRN
Status: DISCONTINUED | OUTPATIENT
Start: 2018-03-28 | End: 2018-03-29 | Stop reason: HOSPADM

## 2018-03-28 RX ORDER — GEMFIBROZIL 600 MG/1
300 TABLET, FILM COATED ORAL
Status: DISCONTINUED | OUTPATIENT
Start: 2018-03-28 | End: 2018-03-28

## 2018-03-28 RX ORDER — PANTOPRAZOLE SODIUM 40 MG/1
40 TABLET, DELAYED RELEASE ORAL DAILY
Status: DISCONTINUED | OUTPATIENT
Start: 2018-03-28 | End: 2018-03-29 | Stop reason: HOSPADM

## 2018-03-28 RX ORDER — ROSUVASTATIN CALCIUM 10 MG/1
10 TABLET, COATED ORAL NIGHTLY
Status: DISCONTINUED | OUTPATIENT
Start: 2018-03-28 | End: 2018-03-28

## 2018-03-28 RX ORDER — ACETAMINOPHEN 500 MG
500 TABLET ORAL EVERY 6 HOURS PRN
Status: DISCONTINUED | OUTPATIENT
Start: 2018-03-28 | End: 2018-03-29 | Stop reason: HOSPADM

## 2018-03-28 RX ORDER — SODIUM CHLORIDE 0.9 % (FLUSH) 0.9 %
3 SYRINGE (ML) INJECTION EVERY 8 HOURS
Status: DISCONTINUED | OUTPATIENT
Start: 2018-03-28 | End: 2018-03-29 | Stop reason: HOSPADM

## 2018-03-28 RX ORDER — LEVOTHYROXINE SODIUM 25 UG/1
25 TABLET ORAL
Status: DISCONTINUED | OUTPATIENT
Start: 2018-03-28 | End: 2018-03-29 | Stop reason: HOSPADM

## 2018-03-28 RX ORDER — NITROFURANTOIN 25; 75 MG/1; MG/1
100 CAPSULE ORAL 2 TIMES DAILY
Status: DISCONTINUED | OUTPATIENT
Start: 2018-03-28 | End: 2018-03-28

## 2018-03-28 RX ORDER — ATROPINE SULFATE 0.1 MG/ML
1 INJECTION INTRAVENOUS
Status: DISCONTINUED | OUTPATIENT
Start: 2018-03-28 | End: 2018-03-28

## 2018-03-28 RX ORDER — ATROPINE SULFATE 0.1 MG/ML
1 INJECTION INTRAVENOUS ONCE AS NEEDED
Status: DISPENSED | OUTPATIENT
Start: 2018-03-28 | End: 2018-03-28

## 2018-03-28 RX ORDER — ALBUTEROL SULFATE 90 UG/1
2 AEROSOL, METERED RESPIRATORY (INHALATION) EVERY 6 HOURS PRN
Status: DISCONTINUED | OUTPATIENT
Start: 2018-03-28 | End: 2018-03-28

## 2018-03-28 RX ORDER — LOSARTAN POTASSIUM 25 MG/1
25 TABLET ORAL NIGHTLY
Status: DISCONTINUED | OUTPATIENT
Start: 2018-03-28 | End: 2018-03-28

## 2018-03-28 RX ORDER — ACETAMINOPHEN 325 MG/1
650 TABLET ORAL EVERY 4 HOURS PRN
Status: DISCONTINUED | OUTPATIENT
Start: 2018-03-28 | End: 2018-03-29 | Stop reason: HOSPADM

## 2018-03-28 RX ORDER — CEPHALEXIN 500 MG/1
500 CAPSULE ORAL EVERY 6 HOURS
Status: DISCONTINUED | OUTPATIENT
Start: 2018-03-28 | End: 2018-03-29 | Stop reason: HOSPADM

## 2018-03-28 RX ORDER — FUROSEMIDE 20 MG/1
20 TABLET ORAL DAILY
Status: DISCONTINUED | OUTPATIENT
Start: 2018-03-28 | End: 2018-03-28

## 2018-03-28 RX ORDER — CEFAZOLIN SODIUM 2 G/50ML
2 SOLUTION INTRAVENOUS
Status: DISCONTINUED | OUTPATIENT
Start: 2018-03-28 | End: 2018-03-29 | Stop reason: HOSPADM

## 2018-03-28 RX ORDER — FUROSEMIDE 20 MG/1
20 TABLET ORAL DAILY
Status: DISCONTINUED | OUTPATIENT
Start: 2018-03-28 | End: 2018-03-29 | Stop reason: HOSPADM

## 2018-03-28 RX ORDER — ROSUVASTATIN CALCIUM 10 MG/1
10 TABLET, COATED ORAL NIGHTLY
Status: DISCONTINUED | OUTPATIENT
Start: 2018-03-28 | End: 2018-03-29 | Stop reason: HOSPADM

## 2018-03-28 RX ORDER — VENLAFAXINE HYDROCHLORIDE 37.5 MG/1
37.5 CAPSULE, EXTENDED RELEASE ORAL DAILY
Status: DISCONTINUED | OUTPATIENT
Start: 2018-03-28 | End: 2018-03-29 | Stop reason: HOSPADM

## 2018-03-28 RX ORDER — RAMELTEON 8 MG/1
8 TABLET ORAL NIGHTLY PRN
Status: DISCONTINUED | OUTPATIENT
Start: 2018-03-28 | End: 2018-03-29 | Stop reason: HOSPADM

## 2018-03-28 RX ORDER — AMOXICILLIN 250 MG
1 CAPSULE ORAL DAILY
Status: DISCONTINUED | OUTPATIENT
Start: 2018-03-28 | End: 2018-03-28

## 2018-03-28 RX ORDER — CLONIDINE HYDROCHLORIDE 0.1 MG/1
0.1 TABLET ORAL 3 TIMES DAILY PRN
Status: DISCONTINUED | OUTPATIENT
Start: 2018-03-28 | End: 2018-03-29 | Stop reason: HOSPADM

## 2018-03-28 RX ORDER — OXYCODONE AND ACETAMINOPHEN 5; 325 MG/1; MG/1
1 TABLET ORAL EVERY 6 HOURS PRN
Status: DISCONTINUED | OUTPATIENT
Start: 2018-03-28 | End: 2018-03-28

## 2018-03-28 RX ORDER — ONDANSETRON 2 MG/ML
4 INJECTION INTRAMUSCULAR; INTRAVENOUS EVERY 8 HOURS PRN
Status: DISCONTINUED | OUTPATIENT
Start: 2018-03-28 | End: 2018-03-28

## 2018-03-28 RX ORDER — VENLAFAXINE HYDROCHLORIDE 37.5 MG/1
37.5 CAPSULE, EXTENDED RELEASE ORAL DAILY
Status: DISCONTINUED | OUTPATIENT
Start: 2018-03-28 | End: 2018-03-28

## 2018-03-28 RX ORDER — ALPRAZOLAM 0.5 MG/1
0.5 TABLET ORAL DAILY PRN
Status: DISCONTINUED | OUTPATIENT
Start: 2018-03-28 | End: 2018-03-28

## 2018-03-28 RX ORDER — AMOXICILLIN 250 MG
1 CAPSULE ORAL 2 TIMES DAILY PRN
Status: DISCONTINUED | OUTPATIENT
Start: 2018-03-28 | End: 2018-03-29 | Stop reason: HOSPADM

## 2018-03-28 RX ORDER — AMIODARONE HYDROCHLORIDE 200 MG/1
200 TABLET ORAL DAILY
Status: DISCONTINUED | OUTPATIENT
Start: 2018-03-29 | End: 2018-03-29 | Stop reason: HOSPADM

## 2018-03-28 RX ORDER — METOPROLOL TARTRATE 25 MG/1
25 TABLET, FILM COATED ORAL 2 TIMES DAILY
Status: DISCONTINUED | OUTPATIENT
Start: 2018-03-28 | End: 2018-03-29 | Stop reason: HOSPADM

## 2018-03-28 RX ORDER — PANTOPRAZOLE SODIUM 40 MG/1
40 TABLET, DELAYED RELEASE ORAL DAILY
Status: DISCONTINUED | OUTPATIENT
Start: 2018-03-28 | End: 2018-03-28

## 2018-03-28 RX ORDER — GEMFIBROZIL 600 MG/1
600 TABLET, FILM COATED ORAL DAILY
Status: DISCONTINUED | OUTPATIENT
Start: 2018-03-28 | End: 2018-03-29 | Stop reason: HOSPADM

## 2018-03-28 RX ORDER — LOSARTAN POTASSIUM 25 MG/1
25 TABLET ORAL NIGHTLY
Status: DISCONTINUED | OUTPATIENT
Start: 2018-03-28 | End: 2018-03-29 | Stop reason: HOSPADM

## 2018-03-28 RX ORDER — OXYCODONE AND ACETAMINOPHEN 5; 325 MG/1; MG/1
1 TABLET ORAL EVERY 4 HOURS PRN
Status: DISCONTINUED | OUTPATIENT
Start: 2018-03-28 | End: 2018-03-29 | Stop reason: HOSPADM

## 2018-03-28 RX ADMIN — CEPHALEXIN 500 MG: 500 CAPSULE ORAL at 04:03

## 2018-03-28 RX ADMIN — ROSUVASTATIN CALCIUM 10 MG: 10 TABLET, FILM COATED ORAL at 09:03

## 2018-03-28 RX ADMIN — LOSARTAN POTASSIUM 25 MG: 25 TABLET, FILM COATED ORAL at 09:03

## 2018-03-28 RX ADMIN — ROSUVASTATIN CALCIUM 10 MG: 10 TABLET, FILM COATED ORAL at 01:03

## 2018-03-28 RX ADMIN — CEPHALEXIN 500 MG: 500 CAPSULE ORAL at 11:03

## 2018-03-28 RX ADMIN — OXYCODONE HYDROCHLORIDE AND ACETAMINOPHEN 1 TABLET: 5; 325 TABLET ORAL at 11:03

## 2018-03-28 RX ADMIN — SODIUM CHLORIDE, PRESERVATIVE FREE 3 ML: 5 INJECTION INTRAVENOUS at 09:03

## 2018-03-28 RX ADMIN — LOSARTAN POTASSIUM 25 MG: 25 TABLET, FILM COATED ORAL at 01:03

## 2018-03-28 RX ADMIN — SODIUM CHLORIDE, PRESERVATIVE FREE 3 ML: 5 INJECTION INTRAVENOUS at 05:03

## 2018-03-28 RX ADMIN — METOPROLOL TARTRATE 25 MG: 25 TABLET ORAL at 09:03

## 2018-03-28 RX ADMIN — LEVOTHYROXINE SODIUM 25 MCG: 25 TABLET ORAL at 05:03

## 2018-03-28 NOTE — ASSESSMENT & PLAN NOTE
Patient was on metoprolol for her atrial fibrillation and was noted to be bradycardic which was the likely etiology of her symptoms.  I have reviewed the EKG and it reveals sinus bradycardia without evidence of acute ischemia.  She was discontinued from the metoprolol but her heart rate increased into the 120's with what was reported to be atrial fibrillation with rapid ventricular response.  She was given a dose of intravenous metoprolol and she was later found to be in a junctional rhythm; I agree with the second EKG.  She is not on any other AV-arturo blockers at this time.  I'm curious if she has sick-sinus syndrome or if this is simply medication-induced.  [facility], Dr. Jw Healy, recommended the patient be transferred to a facility where a permanent pacemaker could be placed if she needed.  She is otherwise hemodynamically-stable a this time.  Will monitor on Telemetry and consult Cardiology in the morning for further recommendation.

## 2018-03-28 NOTE — SUBJECTIVE & OBJECTIVE
Past Medical History:   Diagnosis Date    Anxiety     Arthritis     Bronchitis 11/13/2014    CAD (coronary artery disease)     Chronic back pain     Chronic diastolic CHF (congestive heart failure)     GERD (gastroesophageal reflux disease)     Hyperlipidemia     Hypertension     Paroxysmal A-fib        Past Surgical History:   Procedure Laterality Date    CERVICAL SPINE SURGERY      CHOLECYSTECTOMY      HIP SURGERY         Review of patient's allergies indicates:   Allergen Reactions    Ace inhibitors     Diovan hct [valsartan-hydrochlorothiazide] Swelling    Fosinopril sodium Swelling    Codeine Rash    Niaspan extended-release [niacin] Rash       Current Facility-Administered Medications on File Prior to Encounter   Medication    [DISCONTINUED] albuterol inhaler 2 puff    [DISCONTINUED] ALPRAZolam tablet 0.5 mg    [DISCONTINUED] apixaban tablet 5 mg    [DISCONTINUED] atropine injection 1 mg    [DISCONTINUED] cloNIDine tablet 0.1 mg    [DISCONTINUED] collagenase ointment    [DISCONTINUED] furosemide tablet 20 mg    [DISCONTINUED] gemfibrozil tablet 600 mg    [DISCONTINUED] levothyroxine tablet 25 mcg    [DISCONTINUED] losartan tablet 25 mg    [DISCONTINUED] metoprolol tartrate (LOPRESSOR) tablet 25 mg    [DISCONTINUED] multivitamin tablet 1 tablet    [DISCONTINUED] nitrofurantoin (macrocrystal-monohydrate) 100 MG capsule 100 mg    [DISCONTINUED] ondansetron injection 4 mg    [DISCONTINUED] oxyCODONE-acetaminophen 5-325 mg per tablet 1 tablet    [DISCONTINUED] pantoprazole EC tablet 40 mg    [DISCONTINUED] rosuvastatin tablet 10 mg    [DISCONTINUED] senna-docusate 8.6-50 mg per tablet 1 tablet    [DISCONTINUED] sodium chloride 0.9% flush 3 mL    [DISCONTINUED] venlafaxine 24 hr capsule 37.5 mg     Current Outpatient Prescriptions on File Prior to Encounter   Medication Sig    albuterol 90 mcg/actuation inhaler Inhale 2 puffs into the lungs every 6 (six) hours as needed for  Wheezing. Rescue    alprazolam (XANAX) 0.5 MG tablet Take 1 tablet (0.5 mg total) by mouth daily as needed for Anxiety (caution on sedative effects).    apixaban (ELIQUIS) 5 mg Tab Take 5 mg by mouth 2 (two) times daily.     CA CARB/D3/MAG OX//ANNA/ZN (CALTRATE + D3 PLUS MINERALS ORAL) Take 1 tablet by mouth once daily.     furosemide (LASIX) 20 MG tablet Take 1 tablet (20 mg total) by mouth once daily.    gemfibrozil (LOPID) 600 MG tablet Take 300 mg by mouth. Take 1/2 tablet QHS    lidocaine (LIDODERM) 5 % Place 1 patch onto the skin once daily. Remove & Discard patch within 12 hours or as directed by MD    losartan (COZAAR) 25 MG tablet Take 1 tablet (25 mg total) by mouth every evening.    multivitamin (THERAGRAN) per tablet Take 1 tablet by mouth once daily.    [] nitrofurantoin, macrocrystal-monohydrate, (MACROBID) 100 MG capsule Take 1 capsule (100 mg total) by mouth 2 (two) times daily.    omeprazole (PRILOSEC) 20 MG capsule Take 20 mg by mouth daily as needed.     ondansetron (ZOFRAN-ODT) 4 MG TbDL Take 1 tablet (4 mg total) by mouth every 6 (six) hours as needed.    oxyCODONE-acetaminophen (PERCOCET) 5-325 mg per tablet Take 1 tablet by mouth every 6 (six) hours as needed for Pain.    polycarbophil (FIBERCON) 625 mg tablet Take 625 mg by mouth once daily.    rosuvastatin (CRESTOR) 10 MG tablet Take 10 mg by mouth every evening.    senna-docusate 8.6-50 mg (SENNA WITH DOCUSATE SODIUM) 8.6-50 mg per tablet Take 1 tablet by mouth once daily.    venlafaxine (EFFEXOR-XR) 37.5 MG 24 hr capsule Take 1 capsule (37.5 mg total) by mouth once daily.    vitamins  A,C,E-zinc-copper (PRESERVISION AREDS) 7,160-113-100 unit-mg-unit Tab Take 1 tablet by mouth 2 (two) times daily.      Family History     None        Social History Main Topics    Smoking status: Former Smoker     Quit date: 1984    Smokeless tobacco: Never Used    Alcohol use No    Drug use: No    Sexual activity: No      Review of Systems   Constitution: Negative for decreased appetite.   HENT: Negative for ear discharge.    Eyes: Negative for blurred vision.   Respiratory: Negative for hemoptysis.    Endocrine: Negative for polyphagia.   Hematologic/Lymphatic: Negative for adenopathy.   Skin: Negative for color change.   Musculoskeletal: Negative for joint swelling.   Neurological: Negative for brief paralysis.   Psychiatric/Behavioral: Negative for hallucinations.     Objective:     Vital Signs (Most Recent):  Temp: 98.7 °F (37.1 °C) (03/28/18 0452)  Pulse: 62 (03/28/18 0452)  Resp: 17 (03/28/18 0452)  BP: (!) 154/67 (03/28/18 0452)  SpO2: 96 % (03/28/18 0452) Vital Signs (24h Range):  Temp:  [96.4 °F (35.8 °C)-98.9 °F (37.2 °C)] 98.7 °F (37.1 °C)  Pulse:  [] 62  Resp:  [16-28] 17  SpO2:  [93 %-97 %] 96 %  BP: ()/(53-70) 154/67     Weight: 73.7 kg (162 lb 7.7 oz)  Body mass index is 29.72 kg/m².    SpO2: 96 %  O2 Device (Oxygen Therapy): room air    No intake or output data in the 24 hours ending 03/28/18 0735    Lines/Drains/Airways     Pressure Ulcer                 Pressure Injury 03/26/18 1330 Right medial Buttocks Stage 2 1 day          Peripheral Intravenous Line                 Peripheral IV - Single Lumen 03/25/18 1700 Left Antecubital 2 days                Physical Exam   Constitutional: She is oriented to person, place, and time. She appears well-developed and well-nourished.   HENT:   Head: Normocephalic and atraumatic.   Eyes: Conjunctivae are normal. Pupils are equal, round, and reactive to light.   Neck: Normal range of motion. Neck supple.   Cardiovascular: Normal rate, normal heart sounds and intact distal pulses.    Pulmonary/Chest: Effort normal and breath sounds normal.   Abdominal: Soft. Bowel sounds are normal.   Musculoskeletal: Normal range of motion.   Neurological: She is alert and oriented to person, place, and time.   Skin: Skin is warm and dry.       Significant Labs: All pertinent lab  results from the last 24 hours have been reviewed.    Significant Imaging: X-Ray: CXR: X-Ray Chest 1 View (CXR): No results found for this visit on 03/28/18.

## 2018-03-28 NOTE — HPI
HPI: Mrs. Katherine Hernandez is a 86 y.o. female with essential hypertension, hyperlipidemia (LDL 82.6 Mar 2018), chronic diastolic heart failure (LVEF unknown), CKD Stage 3, paroxysmal atrial fibrillation ([id number] score 4) on chronic anticoagulation, anemia of chronic disease, GERD, and depression who presented initially to [facility] ED yesterday with complaints of weakness for three days.  She was admitted to the Hospital Medicine service at that facility.  Her daughter has a blood pressure cuff at home and found that her heart rate was 42 BPMs.  She did not complain of any shortness of breath, chest pain, palpitations, lightheadedness, nor any dizziness.  She does take metoprolol at home and her daughter states that there is no way that she may have taken too many pills and she is the one who administers Mrs. Hernandez her medications.      Currently NSR 60 on telemetry  EKG sinus bradycardia 51    Followed by Dr Collins  Saw Dr Patterson prior to transfer     History of Present Illness:  Katherine Hernandez is a 86 y.o. female with PMH of paroxysmal Afib, diastolic heart failure, CAD, HTN, HLD, multiple falls and mostly bed bound now, who presents with bradycardia.  Pertinent history/events as follows:     3/25/2018: Pt brought to ED due to increased fatigue and decreased appetite over the last 4 days. As per daughter, she noticed patient was having bradycardia for about a week and her home lopressor dose was initially decreased and then d/angela by Dr. Collins (they were supposed to see him today at his clinic). She was also Dx with UTI by her PCP visit on 3/20 and has been on Macrobid since then. No noted fevers or chills. Does not report any chest pain, shortness of breath, cough, abdominal pain. In ED urinalysis showed no signs of infection. Had bradycardia though and so admitted. EKG showed sinus bradycardia with rate of 55 bpm; low voltage QRS.  Troponin negative.  Pt remained asymptomatic.       3/26/2018:  Beta blocker was continued to be held.  Heart rates improved to the 60's.  Pt with no chest pain or SOB.        3/27/2018: Pt's heart rate increased to 120's-130's and noted to be sustained on telemetry.  She was given a PO dose of metoprolol in the afternoon and heart rate decreased to the 40's .  Pt remained asymptomatic.  EKG now shows junctional rhythm with narrow complex QRS and rate of 51 bpm. Pt currently asymptomatic with junctional rhythm with rate of 49 bpm.      12- echo done in [facility]- 1.The left ventricle is normal in size. Global left ventricular systolic function appears to be borderline normal with difficulty evaluating the left ventricular ejection fraction due to increased heart rate, wall motion abnormality, and atrial fibrillation. Left ventricular diastolic function is inderterminate. Regional wall motion analysis shows hypokinesis of anterior wall, basal anterolateral segment and mid anterolateral segment. Wall motion score index is 1.83. Noted mild left ventricular hypertrophy.  Right ventricular systolic function is normal.  Mild mitral annular calcification is noted. Mild calcification of the mitral valve is noted.  Aortic valve appears to be mildly stenotic based on aortic valve area by continuity equation. Mild calcification is noted. Aortic valve area continuity equation is 1.62 cm². LVOT Diameter is 1.9 cms.  Trace pulmonic regurgitation. Mild (1+) aortic regurgitation. Moderate (2+) mitral regurgitation. Moderate to severe (3+) tricuspid regurgitation.   The pulmonary artery systolic pressure is 61 mmHg. Evidence of suspected pulmonary hypertension is detected.

## 2018-03-28 NOTE — ASSESSMENT & PLAN NOTE
Patient's blood pressure is controlled; will continue home regimen of losartan, and provide as-needed clonidine.

## 2018-03-28 NOTE — PROGRESS NOTES
TN contacted Dr. De Jesus at [phone] to schedule cardiology; spoke with Radha who scheduled the next available appt on 04/10/18 at 10:15 AM..    TN contacted Dr. Gaspar's office at [phone] to schedule a PCP f/u appt; spoke with Dixie; appt scheduled on 04/09/18 at 1:20 PM

## 2018-03-28 NOTE — NURSING
Pt arrived from [facility] via stretcher.  Visualized pt, and Iv.  Pt denies any pain or discomfort. Bed lowered, locked, and armed. Call light is within reach. Will continue to monitor.

## 2018-03-28 NOTE — H&P
[facility] Hospital Medicine  History & Physical    Patient Name: Katherine Hernandez  MRN: [id number]  Admission Date: 3/28/2018  Attending Physician: Cindy Ramos MD   Primary Care Provider: Jacobo Gaspar MD         Patient information was obtained from patient.     Subjective:     Principal Problem:Symptomatic bradycardia    Chief Complaint: Weakness for three days.    HPI: Mrs. Katherine Hernandez is a 86 y.o. female with essential hypertension, hyperlipidemia (LDL 82.6 Mar 2018), chronic diastolic heart failure (LVEF unknown), CKD Stage 3, paroxysmal atrial fibrillation ([id number] score 4) on chronic anticoagulation, anemia of chronic disease, GERD, and depression who presented initially to [facility] ED yesterday with complaints of weakness for three days.  She was admitted to the Hospital Medicine service at that facility.  Her daughter has a blood pressure cuff at home and found that her heart rate was 42 BPMs.  She did not complain of any shortness of breath, chest pain, palpitations, lightheadedness, nor any dizziness.  She does take metoprolol at home and her daughter states that there is no way that she may have taken too many pills and she is the one who administers Mrs. Hernandez her medications.      Chart Review:  Previous Hospitalizations  Date Hospital Diagnosis   Feb 2018 [facility] Acute renal failure, uremic encephalopathy    Jan 2018 [facility] Intractable pain after fall   Dec 2017 [facility] AFib with RVR     Outpatient Follow-Up  Date of Visit Physician Service   Mar 2018 Jacobo Gaspar MD Primary Care     Past Medical History:   Diagnosis Date    Anxiety     Arthritis     Bronchitis 11/13/2014    CAD (coronary artery disease)     Chronic back pain     Chronic diastolic CHF (congestive heart failure)     GERD (gastroesophageal reflux disease)     Hyperlipidemia     Hypertension     Paroxysmal A-fib        Past Surgical History:   Procedure  Laterality Date    CERVICAL SPINE SURGERY      CHOLECYSTECTOMY      HIP SURGERY         Review of patient's allergies indicates:   Allergen Reactions    Ace inhibitors     Diovan hct [valsartan-hydrochlorothiazide] Swelling    Fosinopril sodium Swelling    Codeine Rash    Niaspan extended-release [niacin] Rash       Current Facility-Administered Medications on File Prior to Encounter   Medication    [DISCONTINUED] albuterol inhaler 2 puff    [DISCONTINUED] ALPRAZolam tablet 0.5 mg    [DISCONTINUED] apixaban tablet 5 mg    [DISCONTINUED] atropine injection 1 mg    [DISCONTINUED] cloNIDine tablet 0.1 mg    [DISCONTINUED] collagenase ointment    [DISCONTINUED] furosemide tablet 20 mg    [DISCONTINUED] gemfibrozil tablet 600 mg    [DISCONTINUED] levothyroxine tablet 25 mcg    [DISCONTINUED] losartan tablet 25 mg    [DISCONTINUED] metoprolol tartrate (LOPRESSOR) tablet 25 mg    [DISCONTINUED] multivitamin tablet 1 tablet    [DISCONTINUED] nitrofurantoin (macrocrystal-monohydrate) 100 MG capsule 100 mg    [DISCONTINUED] ondansetron injection 4 mg    [DISCONTINUED] oxyCODONE-acetaminophen 5-325 mg per tablet 1 tablet    [DISCONTINUED] pantoprazole EC tablet 40 mg    [DISCONTINUED] rosuvastatin tablet 10 mg    [DISCONTINUED] senna-docusate 8.6-50 mg per tablet 1 tablet    [DISCONTINUED] sodium chloride 0.9% flush 3 mL    [DISCONTINUED] venlafaxine 24 hr capsule 37.5 mg     Current Outpatient Prescriptions on File Prior to Encounter   Medication Sig    albuterol 90 mcg/actuation inhaler Inhale 2 puffs into the lungs every 6 (six) hours as needed for Wheezing. Rescue    alprazolam (XANAX) 0.5 MG tablet Take 1 tablet (0.5 mg total) by mouth daily as needed for Anxiety (caution on sedative effects).    apixaban (ELIQUIS) 5 mg Tab Take 5 mg by mouth 2 (two) times daily.     CA CARB/D3/MAG OX//ANNA/ZN (CALTRATE + D3 PLUS MINERALS ORAL) Take 1 tablet by mouth once daily.     furosemide (LASIX)  20 MG tablet Take 1 tablet (20 mg total) by mouth once daily.    gemfibrozil (LOPID) 600 MG tablet Take 300 mg by mouth. Take 1/2 tablet QHS    lidocaine (LIDODERM) 5 % Place 1 patch onto the skin once daily. Remove & Discard patch within 12 hours or as directed by MD    losartan (COZAAR) 25 MG tablet Take 1 tablet (25 mg total) by mouth every evening.    multivitamin (THERAGRAN) per tablet Take 1 tablet by mouth once daily.    [] nitrofurantoin, macrocrystal-monohydrate, (MACROBID) 100 MG capsule Take 1 capsule (100 mg total) by mouth 2 (two) times daily.    omeprazole (PRILOSEC) 20 MG capsule Take 20 mg by mouth daily as needed.     ondansetron (ZOFRAN-ODT) 4 MG TbDL Take 1 tablet (4 mg total) by mouth every 6 (six) hours as needed.    oxyCODONE-acetaminophen (PERCOCET) 5-325 mg per tablet Take 1 tablet by mouth every 6 (six) hours as needed for Pain.    polycarbophil (FIBERCON) 625 mg tablet Take 625 mg by mouth once daily.    rosuvastatin (CRESTOR) 10 MG tablet Take 10 mg by mouth every evening.    senna-docusate 8.6-50 mg (SENNA WITH DOCUSATE SODIUM) 8.6-50 mg per tablet Take 1 tablet by mouth once daily.    venlafaxine (EFFEXOR-XR) 37.5 MG 24 hr capsule Take 1 capsule (37.5 mg total) by mouth once daily.    vitamins  A,C,E-zinc-copper (PRESERVISION AREDS) 7,160-113-100 unit-mg-unit Tab Take 1 tablet by mouth 2 (two) times daily.      Family History     None        Social History Main Topics    Smoking status: Former Smoker     Quit date: 1984    Smokeless tobacco: Never Used    Alcohol use No    Drug use: No    Sexual activity: No     Review of Systems   Constitutional: Positive for fatigue. Negative for activity change, appetite change, chills, diaphoresis, fever and unexpected weight change.   HENT: Negative.    Eyes: Negative.    Respiratory: Negative for cough, chest tightness, shortness of breath and wheezing.    Cardiovascular: Negative for chest pain, palpitations and leg  swelling.   Gastrointestinal: Negative for abdominal distention, abdominal pain, blood in stool, constipation, diarrhea, nausea and vomiting.   Genitourinary: Negative for dysuria and hematuria.   Musculoskeletal: Negative.    Skin: Negative.    Neurological: Positive for weakness. Negative for dizziness, seizures, syncope and light-headedness.   Psychiatric/Behavioral: Negative.      Objective:     Vital Signs (Most Recent):  Temp: 98.9 °F (37.2 °C) (03/28/18 0039)  Pulse: 60 (03/28/18 0039)  Resp: 16 (03/28/18 0039)  BP: (!) 155/64 (03/28/18 0039)  SpO2: 97 % (03/28/18 0039) Vital Signs (24h Range):  Temp:  [96.4 °F (35.8 °C)-98.9 °F (37.2 °C)] 98.9 °F (37.2 °C)  Pulse:  [] 60  Resp:  [16-28] 16  SpO2:  [93 %-97 %] 97 %  BP: ()/(53-67) 155/64     Weight: 71.2 kg (156 lb 15.5 oz)  Body mass index is 28.71 kg/m².    Physical Exam   Constitutional: She is oriented to person, place, and time. She appears well-developed and well-nourished. No distress.   HENT:   Head: Normocephalic and atraumatic.   Right Ear: External ear normal.   Left Ear: External ear normal.   Nose: Nose normal.   Eyes: Right eye exhibits no discharge. Left eye exhibits no discharge.   Neck: Normal range of motion.   Cardiovascular: Normal rate, regular rhythm, normal heart sounds and intact distal pulses.  Exam reveals no gallop and no friction rub.    No murmur heard.  Pulmonary/Chest: Effort normal and breath sounds normal. No respiratory distress. She has no wheezes. She has no rales. She exhibits no tenderness.   Abdominal: Soft. Bowel sounds are normal. She exhibits no distension. There is no tenderness. There is no rebound and no guarding.   Musculoskeletal: Normal range of motion. She exhibits no edema.   Neurological: She is alert and oriented to person, place, and time.   Skin: Skin is warm and dry. She is not diaphoretic. No erythema.   Psychiatric: She has a normal mood and affect. Her behavior is normal. Judgment and  thought content normal.   Nursing note and vitals reviewed.          Significant Labs: All pertinent labs within the past 24 hours have been reviewed.    Significant Imaging: I have reviewed and interpreted all pertinent imaging results/findings within the past 24 hours.    Assessment/Plan:     * Symptomatic bradycardia    Patient was on metoprolol for her atrial fibrillation and was noted to be bradycardic which was the likely etiology of her symptoms.  I have reviewed the EKG and it reveals sinus bradycardia without evidence of acute ischemia.  She was discontinued from the metoprolol but her heart rate increased into the 120's with what was reported to be atrial fibrillation with rapid ventricular response.  She was given a dose of intravenous metoprolol and she was later found to be in a junctional rhythm; I agree with the second EKG.  She is not on any other AV-arturo blockers at this time.  I'm curious if she has sick-sinus syndrome or if this is simply medication-induced.  [facility], Dr. Jw Healy, recommended the patient be transferred to a facility where a permanent pacemaker could be placed if she needed.  She is otherwise hemodynamically-stable a this time.  Will monitor on Telemetry and consult Cardiology in the morning for further recommendation.        Essential hypertension    Patient's blood pressure is poorly-controlled; will continue home regimen of losartan, and provide as-needed clonidine.  Will avoid AV-arturo blockers.        Hyperlipidemia    Well-controlled; will continue patient's home regimen of rosuvastatin.        Chronic diastolic heart failure    Stable without evidence of acute heart failure; there are no acute issues.        CKD (chronic kidney disease) stage 3, GFR 30-59 ml/min    Her renal function appears to be at her baseline; will continue to monitor her urine output.        Paroxysmal atrial fibrillation    As addressed above.        Chronic anticoagulation     As addressed above.        Anemia of chronic disease    The patient's H/H is stable and consistent with previous laboratory measurements, and the patient exhibits no signs or symptoms of acute bleeding; there is no indication for transfusion.  Will continue to monitor.        GERD (gastroesophageal reflux disease)    Will substitute her home regimen of omeprazole for pantoprazole while she is inpatient.        Depression    Stable; will continue her home regimen of venlafaxine.          VTE Risk Mitigation     None           Total time spent on case: 60 minutes.        Joanne Castellanos M.D.  Staff Nocturnist  Department of [facility]  [facility]  Pager: [phone]

## 2018-03-28 NOTE — CONSULTS
[facility]  Cardiology  Consult Note    Patient Name: Katherine Hernandez  MRN: [id number]  Admission Date: 3/28/2018  Hospital Length of Stay: 0 days  Code Status: Full Code   Attending Provider: Cindy Ramos MD   Consulting Provider: Jj De Jesus MD  Primary Care Physician: Jacobo Gaspar MD  Principal Problem:Symptomatic bradycardia    Patient information was obtained from patient and ER records.     Consults  Subjective:     Chief Complaint:  Weakness     HPI:   HPI: Mrs. Katherine Hernandez is a 86 y.o. female with essential hypertension, hyperlipidemia (LDL 82.6 Mar 2018), chronic diastolic heart failure (LVEF unknown), CKD Stage 3, paroxysmal atrial fibrillation ([id number] score 4) on chronic anticoagulation, anemia of chronic disease, GERD, and depression who presented initially to [facility] ED yesterday with complaints of weakness for three days.  She was admitted to the Hospital Medicine service at that facility.  Her daughter has a blood pressure cuff at home and found that her heart rate was 42 BPMs.  She did not complain of any shortness of breath, chest pain, palpitations, lightheadedness, nor any dizziness.  She does take metoprolol at home and her daughter states that there is no way that she may have taken too many pills and she is the one who administers Mrs. Hernandez her medications.      Currently NSR 60 on telemetry  EKG sinus bradycardia 51    Followed by Dr Collins  Saw Dr Patterson prior to transfer     History of Present Illness:  Katherine Hernandez is a 86 y.o. female with PMH of paroxysmal Afib, diastolic heart failure, CAD, HTN, HLD, multiple falls and mostly bed bound now, who presents with bradycardia.  Pertinent history/events as follows:     3/25/2018: Pt brought to ED due to increased fatigue and decreased appetite over the last 4 days. As per daughter, she noticed patient was having bradycardia for about a week and her home lopressor dose was initially decreased  and then d/angela by Dr. Collins (they were supposed to see him today at his clinic). She was also Dx with UTI by her PCP visit on 3/20 and has been on Macrobid since then. No noted fevers or chills. Does not report any chest pain, shortness of breath, cough, abdominal pain. In ED urinalysis showed no signs of infection. Had bradycardia though and so admitted. EKG showed sinus bradycardia with rate of 55 bpm; low voltage QRS.  Troponin negative.  Pt remained asymptomatic.       3/26/2018: Beta blocker was continued to be held.  Heart rates improved to the 60's.  Pt with no chest pain or SOB.        3/27/2018: Pt's heart rate increased to 120's-130's and noted to be sustained on telemetry.  She was given a PO dose of metoprolol in the afternoon and heart rate decreased to the 40's .  Pt remained asymptomatic.  EKG now shows junctional rhythm with narrow complex QRS and rate of 51 bpm. Pt currently asymptomatic with junctional rhythm with rate of 49 bpm.      12- echo done in [facility]- 1.The left ventricle is normal in size. Global left ventricular systolic function appears to be borderline normal with difficulty evaluating the left ventricular ejection fraction due to increased heart rate, wall motion abnormality, and atrial fibrillation. Left ventricular diastolic function is inderterminate. Regional wall motion analysis shows hypokinesis of anterior wall, basal anterolateral segment and mid anterolateral segment. Wall motion score index is 1.83. Noted mild left ventricular hypertrophy.  Right ventricular systolic function is normal.  Mild mitral annular calcification is noted. Mild calcification of the mitral valve is noted.  Aortic valve appears to be mildly stenotic based on aortic valve area by continuity equation. Mild calcification is noted. Aortic valve area continuity equation is 1.62 cm². LVOT Diameter is 1.9 cms.  Trace pulmonic regurgitation. Mild (1+) aortic regurgitation. Moderate (2+) mitral  regurgitation. Moderate to severe (3+) tricuspid regurgitation.   The pulmonary artery systolic pressure is 61 mmHg. Evidence of suspected pulmonary hypertension is detected.      Past Medical History:   Diagnosis Date    Anxiety     Arthritis     Bronchitis 11/13/2014    CAD (coronary artery disease)     Chronic back pain     Chronic diastolic CHF (congestive heart failure)     GERD (gastroesophageal reflux disease)     Hyperlipidemia     Hypertension     Paroxysmal A-fib        Past Surgical History:   Procedure Laterality Date    CERVICAL SPINE SURGERY      CHOLECYSTECTOMY      HIP SURGERY         Review of patient's allergies indicates:   Allergen Reactions    Ace inhibitors     Diovan hct [valsartan-hydrochlorothiazide] Swelling    Fosinopril sodium Swelling    Codeine Rash    Niaspan extended-release [niacin] Rash       Current Facility-Administered Medications on File Prior to Encounter   Medication    [DISCONTINUED] albuterol inhaler 2 puff    [DISCONTINUED] ALPRAZolam tablet 0.5 mg    [DISCONTINUED] apixaban tablet 5 mg    [DISCONTINUED] atropine injection 1 mg    [DISCONTINUED] cloNIDine tablet 0.1 mg    [DISCONTINUED] collagenase ointment    [DISCONTINUED] furosemide tablet 20 mg    [DISCONTINUED] gemfibrozil tablet 600 mg    [DISCONTINUED] levothyroxine tablet 25 mcg    [DISCONTINUED] losartan tablet 25 mg    [DISCONTINUED] metoprolol tartrate (LOPRESSOR) tablet 25 mg    [DISCONTINUED] multivitamin tablet 1 tablet    [DISCONTINUED] nitrofurantoin (macrocrystal-monohydrate) 100 MG capsule 100 mg    [DISCONTINUED] ondansetron injection 4 mg    [DISCONTINUED] oxyCODONE-acetaminophen 5-325 mg per tablet 1 tablet    [DISCONTINUED] pantoprazole EC tablet 40 mg    [DISCONTINUED] rosuvastatin tablet 10 mg    [DISCONTINUED] senna-docusate 8.6-50 mg per tablet 1 tablet    [DISCONTINUED] sodium chloride 0.9% flush 3 mL    [DISCONTINUED] venlafaxine 24 hr capsule 37.5 mg      Current Outpatient Prescriptions on File Prior to Encounter   Medication Sig    albuterol 90 mcg/actuation inhaler Inhale 2 puffs into the lungs every 6 (six) hours as needed for Wheezing. Rescue    alprazolam (XANAX) 0.5 MG tablet Take 1 tablet (0.5 mg total) by mouth daily as needed for Anxiety (caution on sedative effects).    apixaban (ELIQUIS) 5 mg Tab Take 5 mg by mouth 2 (two) times daily.     CA CARB/D3/MAG OX//ANNA/ZN (CALTRATE + D3 PLUS MINERALS ORAL) Take 1 tablet by mouth once daily.     furosemide (LASIX) 20 MG tablet Take 1 tablet (20 mg total) by mouth once daily.    gemfibrozil (LOPID) 600 MG tablet Take 300 mg by mouth. Take 1/2 tablet QHS    lidocaine (LIDODERM) 5 % Place 1 patch onto the skin once daily. Remove & Discard patch within 12 hours or as directed by MD    losartan (COZAAR) 25 MG tablet Take 1 tablet (25 mg total) by mouth every evening.    multivitamin (THERAGRAN) per tablet Take 1 tablet by mouth once daily.    [] nitrofurantoin, macrocrystal-monohydrate, (MACROBID) 100 MG capsule Take 1 capsule (100 mg total) by mouth 2 (two) times daily.    omeprazole (PRILOSEC) 20 MG capsule Take 20 mg by mouth daily as needed.     ondansetron (ZOFRAN-ODT) 4 MG TbDL Take 1 tablet (4 mg total) by mouth every 6 (six) hours as needed.    oxyCODONE-acetaminophen (PERCOCET) 5-325 mg per tablet Take 1 tablet by mouth every 6 (six) hours as needed for Pain.    polycarbophil (FIBERCON) 625 mg tablet Take 625 mg by mouth once daily.    rosuvastatin (CRESTOR) 10 MG tablet Take 10 mg by mouth every evening.    senna-docusate 8.6-50 mg (SENNA WITH DOCUSATE SODIUM) 8.6-50 mg per tablet Take 1 tablet by mouth once daily.    venlafaxine (EFFEXOR-XR) 37.5 MG 24 hr capsule Take 1 capsule (37.5 mg total) by mouth once daily.    vitamins  A,C,E-zinc-copper (PRESERVISION AREDS) 7,160-113-100 unit-mg-unit Tab Take 1 tablet by mouth 2 (two) times daily.      Family History     None         Social History Main Topics    Smoking status: Former Smoker     Quit date: 7/30/1984    Smokeless tobacco: Never Used    Alcohol use No    Drug use: No    Sexual activity: No     Review of Systems   Constitution: Negative for decreased appetite.   HENT: Negative for ear discharge.    Eyes: Negative for blurred vision.   Respiratory: Negative for hemoptysis.    Endocrine: Negative for polyphagia.   Hematologic/Lymphatic: Negative for adenopathy.   Skin: Negative for color change.   Musculoskeletal: Negative for joint swelling.   Neurological: Negative for brief paralysis.   Psychiatric/Behavioral: Negative for hallucinations.     Objective:     Vital Signs (Most Recent):  Temp: 98.7 °F (37.1 °C) (03/28/18 0452)  Pulse: 62 (03/28/18 0452)  Resp: 17 (03/28/18 0452)  BP: (!) 154/67 (03/28/18 0452)  SpO2: 96 % (03/28/18 0452) Vital Signs (24h Range):  Temp:  [96.4 °F (35.8 °C)-98.9 °F (37.2 °C)] 98.7 °F (37.1 °C)  Pulse:  [] 62  Resp:  [16-28] 17  SpO2:  [93 %-97 %] 96 %  BP: ()/(53-70) 154/67     Weight: 73.7 kg (162 lb 7.7 oz)  Body mass index is 29.72 kg/m².    SpO2: 96 %  O2 Device (Oxygen Therapy): room air    No intake or output data in the 24 hours ending 03/28/18 0735    Lines/Drains/Airways     Pressure Ulcer                 Pressure Injury 03/26/18 1330 Right medial Buttocks Stage 2 1 day          Peripheral Intravenous Line                 Peripheral IV - Single Lumen 03/25/18 1700 Left Antecubital 2 days                Physical Exam   Constitutional: She is oriented to person, place, and time. She appears well-developed and well-nourished.   HENT:   Head: Normocephalic and atraumatic.   Eyes: Conjunctivae are normal. Pupils are equal, round, and reactive to light.   Neck: Normal range of motion. Neck supple.   Cardiovascular: Normal rate, normal heart sounds and intact distal pulses.    Pulmonary/Chest: Effort normal and breath sounds normal.   Abdominal: Soft. Bowel sounds are normal.    Musculoskeletal: Normal range of motion.   Neurological: She is alert and oriented to person, place, and time.   Skin: Skin is warm and dry.       Significant Labs: All pertinent lab results from the last 24 hours have been reviewed.    Significant Imaging: X-Ray: CXR: X-Ray Chest 1 View (CXR): No results found for this visit on 03/28/18.    Assessment and Plan:     * Symptomatic bradycardia             Physical deconditioning             CKD (chronic kidney disease) stage 3, GFR 30-59 ml/min             Paroxysmal atrial fibrillation    Evidence of SSS with tachy-tejal syndrome. Eliquis has been on hold. Recommend dual PPM prior to restarting amiodarone and B-blocker. Risks/benefits explained and she agrees to proceed. Recent echo with good EF        Chronic diastolic heart failure    Volume status stable        Hyperlipidemia             Essential hypertension                 VTE Risk Mitigation     None          Thank you for your consult. I will follow-up with patient. Please contact us if you have any additional questions.    Jj De Jesus MD  Cardiology   [facility]

## 2018-03-28 NOTE — PROGRESS NOTES
[facility] Hospital Medicine  Progress Note    Patient Name: Katherine Hernandez  MRN: [id number]  Patient Class: IP- Inpatient   Admission Date: 3/28/2018  Length of Stay: 0 days  Attending Physician: Cindy Ramos MD  Primary Care Provider: Jacobo Gaspar MD        Subjective:     Principal Problem:Symptomatic bradycardia    HPI:  Mrs. Katherine Hernandez is a 86 y.o. Woman with essential hypertension, hyperlipidemia (LDL 82.6 Mar 2018), chronic diastolic heart failure (LVEF unknown), CKD Stage 3, paroxysmal atrial fibrillation ([id number] score 4) on chronic anticoagulation, anemia of chronic disease, GERD, and depression who presented initially to [facility] ED 3/26/18 with complaints of weakness for three days.  She was admitted to the Hospital Medicine service at that facility.  Her daughter has a blood pressure cuff at home and found that her heart rate was 42 BPMs.  She did not complain of any shortness of breath, chest pain, palpitations, lightheadedness, nor any dizziness.  She does take metoprolol at home and her daughter states that there is no way that she may have taken too many pills and she is the one who administers Mrs. Hernandez her medications.      Hospital Course:  Patient was on metoprolol for her atrial fibrillation and was noted to be bradycardic which was the likely etiology of her symptoms. EKG w/ sinus bradycardia without evidence of acute ischemia.  She was discontinued from the metoprolol but her heart rate increased into the 120's with what was reported to be atrial fibrillation with rapid ventricular response. She was given a dose of intravenous metoprolol and she was later found to be in a junctional rhythm. She was not on any other AV-arturo blockers at this time.  I'm curious if she has sick-sinus syndrome or if this is simply medication-induced. [facility], Dr. Jw Healy, recommended the patient be transferred to a facility where a permanent  pacemaker could be placed. She appears to have SSS. Cardiology recommended holding medications for A fib until PPM placed.    Interval history:  No acute events.    Review of Systems   Constitutional: Positive for fatigue. Negative for activity change, appetite change, chills, diaphoresis, fever and unexpected weight change.   HENT: Negative.    Eyes: Negative.    Respiratory: Negative for cough, chest tightness, shortness of breath and wheezing.    Cardiovascular: Negative for chest pain, palpitations and leg swelling.   Gastrointestinal: Negative for abdominal distention, abdominal pain, blood in stool, constipation, diarrhea, nausea and vomiting.   Genitourinary: Negative for dysuria and hematuria.   Musculoskeletal: Negative.    Skin: Negative.    Neurological: Positive for weakness. Negative for dizziness, seizures, syncope and light-headedness.   Psychiatric/Behavioral: Negative.  Sleep disturbance:        Objective:     Vital Signs (Most Recent):  Temp: 98.2 °F (36.8 °C) (03/28/18 1300)  Pulse: 70 (03/28/18 1300)  Resp: 18 (03/28/18 1300)  BP: (!) 126/58 (03/28/18 1300)  SpO2: 95 % (03/28/18 1300) Vital Signs (24h Range):  Temp:  [96.9 °F (36.1 °C)-98.9 °F (37.2 °C)] 98.2 °F (36.8 °C)  Pulse:  [43-71] 70  Resp:  [16-28] 18  SpO2:  [93 %-97 %] 95 %  BP: ()/(53-70) 126/58     Weight: 73.7 kg (162 lb 7.7 oz)  Body mass index is 29.72 kg/m².    Physical Exam   Constitutional: She is oriented to person, place, and time. She appears well-developed and well-nourished. No distress.   HENT:   Head: Normocephalic and atraumatic.   Right Ear: External ear normal.   Left Ear: External ear normal.   Nose: Nose normal.   Eyes: Right eye exhibits no discharge. Left eye exhibits no discharge.   Neck: Normal range of motion.   Cardiovascular: Normal rate, regular rhythm, normal heart sounds and intact distal pulses.  Exam reveals no gallop and no friction rub.    No murmur heard.  Pulmonary/Chest: Effort normal and  breath sounds normal. No respiratory distress. She has no wheezes. She has no rales. She exhibits no tenderness.   Abdominal: Soft. Bowel sounds are normal. She exhibits no distension. There is no tenderness. There is no rebound and no guarding.   Musculoskeletal: Normal range of motion. She exhibits no edema.   Neurological: She is alert and oriented to person, place, and time.   Skin: Skin is warm and dry. She is not diaphoretic. No erythema.   Psychiatric: She has a normal mood and affect. Her behavior is normal. Judgment and thought content normal.   Nursing note and vitals reviewed.        Significant Labs: All pertinent labs within the past 24 hours have been reviewed.    Significant Imaging: I have reviewed and interpreted all pertinent imaging results/findings within the past 24 hours.    Assessment/Plan:      * Symptomatic bradycardia    Monitor on Telemetry. Appreciate Cardiology's recommendation. PPM 3/28/2018.        Paroxysmal atrial fibrillation    Meds held until PPM.         Physical deconditioning    PT/OT after PPM.        GERD (gastroesophageal reflux disease)    Will substitute her home regimen of omeprazole for pantoprazole while she is inpatient.        Anemia of chronic disease    The patient's H/H is stable and consistent with previous laboratory measurements, and the patient exhibits no signs or symptoms of acute bleeding; there is no indication for transfusion.         Chronic anticoagulation    As addressed above.        CKD (chronic kidney disease) stage 3, GFR 30-59 ml/min    Her renal function appears to be at her baseline; will continue to monitor her urine output.        Chronic diastolic heart failure    Stable without evidence of acute heart failure; there are no acute issues.        Hyperlipidemia    Well-controlled; will continue patient's home regimen of rosuvastatin.        Depression    Stable; will continue her home regimen of venlafaxine.        Essential hypertension     Patient's blood pressure is controlled; will continue home regimen of losartan, and provide as-needed clonidine.           VTE Risk Mitigation     None              Cindy Ramos MD  Department of Hospital Medicine   [facility]

## 2018-03-28 NOTE — HOSPITAL COURSE
Patient was on metoprolol for her atrial fibrillation and was noted to be bradycardic which was the likely etiology of her symptoms. EKG w/ sinus bradycardia without evidence of acute ischemia.  She was discontinued from the metoprolol but her heart rate increased into the 120's with what was reported to be atrial fibrillation with rapid ventricular response. She was given a dose of intravenous metoprolol and she was later found to be in a junctional rhythm. She was not on any other AV-arturo blockers at this time.  I'm curious if she has sick-sinus syndrome or if this is simply medication-induced. [facility], Dr. Jw Healy, recommended the patient be transferred to a facility where a permanent pacemaker could be placed. She appears to have SSS. Cardiology recommended holding medications for A fib until PPM placed. On 3/28/18 she had a [employer] dual pacemaker placed in the left SC vein. She was stable overnight and stable for discharge home. Restarted amio and metoprolol on DC. She needs staples out in 2 weeks either by her regular cardiologist or Dr. De Jesus. Rx for keflex and percocet given to patient at discharge.  PT recommended home health PT. She will also have home health wound care.

## 2018-03-28 NOTE — SUBJECTIVE & OBJECTIVE
Past Medical History:   Diagnosis Date    Anxiety     Arthritis     Bronchitis 11/13/2014    CAD (coronary artery disease)     Chronic back pain     Chronic diastolic CHF (congestive heart failure)     GERD (gastroesophageal reflux disease)     Hyperlipidemia     Hypertension     Paroxysmal A-fib        Past Surgical History:   Procedure Laterality Date    CERVICAL SPINE SURGERY      CHOLECYSTECTOMY      HIP SURGERY         Review of patient's allergies indicates:   Allergen Reactions    Ace inhibitors     Diovan hct [valsartan-hydrochlorothiazide] Swelling    Fosinopril sodium Swelling    Codeine Rash    Niaspan extended-release [niacin] Rash       Current Facility-Administered Medications on File Prior to Encounter   Medication    [DISCONTINUED] albuterol inhaler 2 puff    [DISCONTINUED] ALPRAZolam tablet 0.5 mg    [DISCONTINUED] apixaban tablet 5 mg    [DISCONTINUED] atropine injection 1 mg    [DISCONTINUED] cloNIDine tablet 0.1 mg    [DISCONTINUED] collagenase ointment    [DISCONTINUED] furosemide tablet 20 mg    [DISCONTINUED] gemfibrozil tablet 600 mg    [DISCONTINUED] levothyroxine tablet 25 mcg    [DISCONTINUED] losartan tablet 25 mg    [DISCONTINUED] metoprolol tartrate (LOPRESSOR) tablet 25 mg    [DISCONTINUED] multivitamin tablet 1 tablet    [DISCONTINUED] nitrofurantoin (macrocrystal-monohydrate) 100 MG capsule 100 mg    [DISCONTINUED] ondansetron injection 4 mg    [DISCONTINUED] oxyCODONE-acetaminophen 5-325 mg per tablet 1 tablet    [DISCONTINUED] pantoprazole EC tablet 40 mg    [DISCONTINUED] rosuvastatin tablet 10 mg    [DISCONTINUED] senna-docusate 8.6-50 mg per tablet 1 tablet    [DISCONTINUED] sodium chloride 0.9% flush 3 mL    [DISCONTINUED] venlafaxine 24 hr capsule 37.5 mg     Current Outpatient Prescriptions on File Prior to Encounter   Medication Sig    albuterol 90 mcg/actuation inhaler Inhale 2 puffs into the lungs every 6 (six) hours as needed for  Wheezing. Rescue    alprazolam (XANAX) 0.5 MG tablet Take 1 tablet (0.5 mg total) by mouth daily as needed for Anxiety (caution on sedative effects).    apixaban (ELIQUIS) 5 mg Tab Take 5 mg by mouth 2 (two) times daily.     CA CARB/D3/MAG OX//ANNA/ZN (CALTRATE + D3 PLUS MINERALS ORAL) Take 1 tablet by mouth once daily.     furosemide (LASIX) 20 MG tablet Take 1 tablet (20 mg total) by mouth once daily.    gemfibrozil (LOPID) 600 MG tablet Take 300 mg by mouth. Take 1/2 tablet QHS    lidocaine (LIDODERM) 5 % Place 1 patch onto the skin once daily. Remove & Discard patch within 12 hours or as directed by MD    losartan (COZAAR) 25 MG tablet Take 1 tablet (25 mg total) by mouth every evening.    multivitamin (THERAGRAN) per tablet Take 1 tablet by mouth once daily.    [] nitrofurantoin, macrocrystal-monohydrate, (MACROBID) 100 MG capsule Take 1 capsule (100 mg total) by mouth 2 (two) times daily.    omeprazole (PRILOSEC) 20 MG capsule Take 20 mg by mouth daily as needed.     ondansetron (ZOFRAN-ODT) 4 MG TbDL Take 1 tablet (4 mg total) by mouth every 6 (six) hours as needed.    oxyCODONE-acetaminophen (PERCOCET) 5-325 mg per tablet Take 1 tablet by mouth every 6 (six) hours as needed for Pain.    polycarbophil (FIBERCON) 625 mg tablet Take 625 mg by mouth once daily.    rosuvastatin (CRESTOR) 10 MG tablet Take 10 mg by mouth every evening.    senna-docusate 8.6-50 mg (SENNA WITH DOCUSATE SODIUM) 8.6-50 mg per tablet Take 1 tablet by mouth once daily.    venlafaxine (EFFEXOR-XR) 37.5 MG 24 hr capsule Take 1 capsule (37.5 mg total) by mouth once daily.    vitamins  A,C,E-zinc-copper (PRESERVISION AREDS) 7,160-113-100 unit-mg-unit Tab Take 1 tablet by mouth 2 (two) times daily.      Family History     None        Social History Main Topics    Smoking status: Former Smoker     Quit date: 1984    Smokeless tobacco: Never Used    Alcohol use No    Drug use: No    Sexual activity: No      Review of Systems   Constitutional: Positive for fatigue. Negative for activity change, appetite change, chills, diaphoresis, fever and unexpected weight change.   HENT: Negative.    Eyes: Negative.    Respiratory: Negative for cough, chest tightness, shortness of breath and wheezing.    Cardiovascular: Negative for chest pain, palpitations and leg swelling.   Gastrointestinal: Negative for abdominal distention, abdominal pain, blood in stool, constipation, diarrhea, nausea and vomiting.   Genitourinary: Negative for dysuria and hematuria.   Musculoskeletal: Negative.    Skin: Negative.    Neurological: Positive for weakness. Negative for dizziness, seizures, syncope and light-headedness.   Psychiatric/Behavioral: Negative.      Objective:     Vital Signs (Most Recent):  Temp: 98.9 °F (37.2 °C) (03/28/18 0039)  Pulse: 60 (03/28/18 0039)  Resp: 16 (03/28/18 0039)  BP: (!) 155/64 (03/28/18 0039)  SpO2: 97 % (03/28/18 0039) Vital Signs (24h Range):  Temp:  [96.4 °F (35.8 °C)-98.9 °F (37.2 °C)] 98.9 °F (37.2 °C)  Pulse:  [] 60  Resp:  [16-28] 16  SpO2:  [93 %-97 %] 97 %  BP: ()/(53-67) 155/64     Weight: 71.2 kg (156 lb 15.5 oz)  Body mass index is 28.71 kg/m².    Physical Exam   Constitutional: She is oriented to person, place, and time. She appears well-developed and well-nourished. No distress.   HENT:   Head: Normocephalic and atraumatic.   Right Ear: External ear normal.   Left Ear: External ear normal.   Nose: Nose normal.   Eyes: Right eye exhibits no discharge. Left eye exhibits no discharge.   Neck: Normal range of motion.   Cardiovascular: Normal rate, regular rhythm, normal heart sounds and intact distal pulses.  Exam reveals no gallop and no friction rub.    No murmur heard.  Pulmonary/Chest: Effort normal and breath sounds normal. No respiratory distress. She has no wheezes. She has no rales. She exhibits no tenderness.   Abdominal: Soft. Bowel sounds are normal. She exhibits no distension.  There is no tenderness. There is no rebound and no guarding.   Musculoskeletal: Normal range of motion. She exhibits no edema.   Neurological: She is alert and oriented to person, place, and time.   Skin: Skin is warm and dry. She is not diaphoretic. No erythema.   Psychiatric: She has a normal mood and affect. Her behavior is normal. Judgment and thought content normal.   Nursing note and vitals reviewed.          Significant Labs: All pertinent labs within the past 24 hours have been reviewed.    Significant Imaging: I have reviewed and interpreted all pertinent imaging results/findings within the past 24 hours.

## 2018-03-28 NOTE — PROCEDURES
"Katherine Hernandez is a 86 y.o. female patient.    Temp: 98.5 °F (36.9 °C) (03/28/18 0752)  Pulse: 60 (03/28/18 0752)  Resp: 18 (03/28/18 0752)  BP: 134/60 (03/28/18 0752)  SpO2: (!) 94 % (03/28/18 0820)  Weight: 73.7 kg (162 lb 7.7 oz) (03/28/18 0452)  Height: 5' 2" (157.5 cm) (03/28/18 0039)       Procedures     Pre-sedation Assessment:    1. ASA Score: ASA 3 - Patient with moderate systemic disease with functional limitations  2. Mallampati Class: II (hard and soft palate, upper portion of tonsils anduvula visible)  3. Patient or family history of any reaction to anesthesia or sedation: No  4. Plan for Sedation: Moderate  5. H&P within 30 days of the procedure and updated within 24 hrs of admission or registration: Yes    Jj De Jesus  3/28/2018  "

## 2018-03-28 NOTE — SUBJECTIVE & OBJECTIVE
Interval history:  No acute events.    Review of Systems   Constitutional: Positive for fatigue. Negative for activity change, appetite change, chills, diaphoresis, fever and unexpected weight change.   HENT: Negative.    Eyes: Negative.    Respiratory: Negative for cough, chest tightness, shortness of breath and wheezing.    Cardiovascular: Negative for chest pain, palpitations and leg swelling.   Gastrointestinal: Negative for abdominal distention, abdominal pain, blood in stool, constipation, diarrhea, nausea and vomiting.   Genitourinary: Negative for dysuria and hematuria.   Musculoskeletal: Negative.    Skin: Negative.    Neurological: Positive for weakness. Negative for dizziness, seizures, syncope and light-headedness.   Psychiatric/Behavioral: Negative.  Sleep disturbance:        Objective:     Vital Signs (Most Recent):  Temp: 98.2 °F (36.8 °C) (03/28/18 1300)  Pulse: 70 (03/28/18 1300)  Resp: 18 (03/28/18 1300)  BP: (!) 126/58 (03/28/18 1300)  SpO2: 95 % (03/28/18 1300) Vital Signs (24h Range):  Temp:  [96.9 °F (36.1 °C)-98.9 °F (37.2 °C)] 98.2 °F (36.8 °C)  Pulse:  [43-71] 70  Resp:  [16-28] 18  SpO2:  [93 %-97 %] 95 %  BP: ()/(53-70) 126/58     Weight: 73.7 kg (162 lb 7.7 oz)  Body mass index is 29.72 kg/m².    Physical Exam   Constitutional: She is oriented to person, place, and time. She appears well-developed and well-nourished. No distress.   HENT:   Head: Normocephalic and atraumatic.   Right Ear: External ear normal.   Left Ear: External ear normal.   Nose: Nose normal.   Eyes: Right eye exhibits no discharge. Left eye exhibits no discharge.   Neck: Normal range of motion.   Cardiovascular: Normal rate, regular rhythm, normal heart sounds and intact distal pulses.  Exam reveals no gallop and no friction rub.    No murmur heard.  Pulmonary/Chest: Effort normal and breath sounds normal. No respiratory distress. She has no wheezes. She has no rales. She exhibits no tenderness.   Abdominal:  Soft. Bowel sounds are normal. She exhibits no distension. There is no tenderness. There is no rebound and no guarding.   Musculoskeletal: Normal range of motion. She exhibits no edema.   Neurological: She is alert and oriented to person, place, and time.   Skin: Skin is warm and dry. She is not diaphoretic. No erythema.   Psychiatric: She has a normal mood and affect. Her behavior is normal. Judgment and thought content normal.   Nursing note and vitals reviewed.        Significant Labs: All pertinent labs within the past 24 hours have been reviewed.    Significant Imaging: I have reviewed and interpreted all pertinent imaging results/findings within the past 24 hours.

## 2018-03-28 NOTE — PROGRESS NOTES
"   [facility]  Adult Nutrition  Progress Note    SUMMARY       Recommendations  1) Advance patient to a Cardiac diet (texture per Speech Therapy) 2) Boost Plus vanilla or strawberry TID (consistency per Speech Therapy) 3) Provide total feeding assistance 4) Monitor oral intake and tolerance 5) Monitor weight    Goals: 1) Patient to recieve diet order within 24 - 48 hours   Nutrition Goal Status: new  Communication of RD Recs: other (comment) (care plan/notes)    Reason for Assessment    Reason for Assessment: identified at risk by screening criteria (dysphagia or difficulty swallowing)  Diagnosis: other (see comments) (symptomatic bradycardia)  Relevant Medical History: CAD, CHF, HTN, CKD3    General Information Comments: Patient off of floor for procedure. NPO. History of dysphagia, required tota feeding assistance. Previously on mechanical soft diet with nectar thick liquids (no rice). Speech to evaluate patient tomorrow per notes. Daughter reports poor appetite for the past week.     Nutrition Risk Screen    Nutrition Risk Screen: dysphagia or difficulty swallowing (thickened liquids.)    Nutrition/Diet History    Food Preferences: wilfredo  Do you have any cultural, spiritual, Mormonism conflicts, given your current situation?: no  Factors Affecting Nutritional Intake: difficulty/impaired swallowing, inability to feed self, NPO    Anthropometrics    Temp: 98.3 °F (36.8 °C)  Height Method: Stated  Height: 5' 2" (157.5 cm)  Height (inches): 62 in  Weight Method: Bed Scale  Weight: 73.7 kg (162 lb 7.7 oz)  Weight (lb): 162.48 lb  Ideal Body Weight (IBW), Female: 110 lb  % Ideal Body Weight, Female (lb): 147.71 lb  BMI (Calculated): 29.8  Weight Loss: unintentional  Usual Body Weight (UBW), k.9 kg  Weight Change Amount: 9 lb 4.2 oz (over 3 months)  % Usual Body Weight: 94.81  % Weight Change From Usual Weight: -5.39 %    Lab/Procedures/Meds    Pertinent Labs Reviewed: reviewed  Pertinent Labs " "Comments: Alb 2.4 (L), Na 135 (L), GFR 41 (L), Ca 8.4 (L)  Pertinent Medications Reviewed: reviewed  Pertinent Medications Comments: lasix, statin    Physical Findings/Assessment    Overall Physical Appearance:  (wilfredo)  Skin: pressure ulcer(s) (Stage II; Desean Score 16)    Estimated/Assessed Needs    Weight Used For Calorie Calculations: 73.7 kg (162 lb 7.7 oz)  Height: 5' 2" (157.5 cm)  Energy Need Method: McKenzie-St Jeor (1695 (RMR x1.5) )  RMR (McKenzie-St. Jeor Equation): 1130.25  Protein Requirements: 74 g  Weight Used For Protein Calculations: 73.7 kg (162 lb 7.7 oz)  Fluid Need Method: RDA Method, other (see comments) (or per MD)  CHO Requirement: N/A       Nutrition Prescription Ordered    Current Diet Order: NPO    Evaluation of Received Nutrient/Fluid Intake    I/O: not recorded  Energy Calories Required: not meeting needs  Protein Required: not meeting needs  Fluid Required: not meeting needs  Comments: LBM: 3/27  % Intake of Estimated Energy Needs: 0 - 25 %  % Meal Intake: NPO    Nutrition Risk    Level of Risk/Frequency of Follow-up:  (F/U 2x weekly)     Assessment and Plan    Severe malnutrition    Contributing Nutrition Diagnosis  advanced age, impaired cognitive and motor skills    Related to (etiology):   Inadequate oral intake    Signs and Symptoms (as evidenced by):   1) 5% unintentional weight loss over 3 months  2) Patient consuming < 50% of estimated energy requirements > 5 days     Interventions/Recommendations (treatment strategy):  1) Advance patient to a Cardiac diet (texture per Speech Therapy) 2) Boost Plus vanilla or strawberry TID (consistency per Speech Therapy) 3) Provide total feeding assistance 4) Monitor oral intake and tolerance 5) Monitor weight    Nutrition Diagnosis Status:   New               Monitor and Evaluation    Food and Nutrient Intake: energy intake, food and beverage intake  Food and Nutrient Adminstration: diet order, other (specify) (supplement order)  Physical " Activity and Function: nutrition-related ADLs and IADLs  Anthropometric Measurements: weight, weight change, body mass index  Biochemical Data, Medical Tests and Procedures: electrolyte and renal panel, gastrointestinal profile, inflammatory profile  Nutrition-Focused Physical Findings: overall appearance, skin     Nutrition Follow-Up    RD Follow-up?: Yes

## 2018-03-28 NOTE — HPI
Mrs. Katherine Hernandez is a 86 y.o. Woman with essential hypertension, hyperlipidemia (LDL 82.6 Mar 2018), chronic diastolic heart failure (LVEF unknown), CKD Stage 3, paroxysmal atrial fibrillation ([id number] score 4) on chronic anticoagulation, anemia of chronic disease, GERD, and depression who presented initially to [facility] ED 3/26/18 with complaints of weakness for three days.  She was admitted to the Hospital Medicine service at that facility.  Her daughter has a blood pressure cuff at home and found that her heart rate was 42 BPMs.  She did not complain of any shortness of breath, chest pain, palpitations, lightheadedness, nor any dizziness.  She does take metoprolol at home and her daughter states that there is no way that she may have taken too many pills and she is the one who administers Mrs. Hernandez her medications.

## 2018-03-28 NOTE — PT/OT/SLP PROGRESS
Speech Language Pathology      Katherine Hernandez  MRN: [id number]    Patient not seen today secondary to pt DANO for pacemake procedure. ST will f/u next date to initiate BSE.    Cha Bhakta, JILLIAN-SLP

## 2018-03-28 NOTE — PLAN OF CARE
Problem: Patient Care Overview  Goal: Plan of Care Review  03/28/2018    Recommendations  1) Advance patient to a Cardiac diet (texture per Speech Therapy) 2) Boost Plus vanilla or strawberry TID (consistency per Speech Therapy) 3) Provide total feeding assistance 4) Monitor oral intake and tolerance 5) Monitor weight    Goals: 1) Patient to recieve diet order within 24 - 48 hours   Nutrition Goal Status: new  Communication of RD Recs: other (comment) (care plan/notes)    Felicia Meyer, MPH, RD, LDN

## 2018-03-28 NOTE — ASSESSMENT & PLAN NOTE
Contributing Nutrition Diagnosis  advanced age, impaired cognitive and motor skills    Related to (etiology):   Inadequate oral intake    Signs and Symptoms (as evidenced by):   1) 5% unintentional weight loss over 3 months  2) Patient consuming < 50% of estimated energy requirements > 5 days     Interventions/Recommendations (treatment strategy):  1) Advance patient to a Cardiac diet (texture per Speech Therapy) 2) Boost Plus vanilla or strawberry TID (consistency per Speech Therapy) 3) Provide total feeding assistance 4) Monitor oral intake and tolerance 5) Monitor weight    Nutrition Diagnosis Status:   New

## 2018-03-28 NOTE — ASSESSMENT & PLAN NOTE
The patient's H/H is stable and consistent with previous laboratory measurements, and the patient exhibits no signs or symptoms of acute bleeding; there is no indication for transfusion.

## 2018-03-28 NOTE — ASSESSMENT & PLAN NOTE
Evidence of SSS with tachy-tejal syndrome. Eliquis has been on hold. Recommend dual PPM prior to restarting amiodarone and B-blocker. Risks/benefits explained and she agrees to proceed. Recent echo with good EF

## 2018-03-29 VITALS
HEIGHT: 62 IN | SYSTOLIC BLOOD PRESSURE: 118 MMHG | HEART RATE: 66 BPM | RESPIRATION RATE: 18 BRPM | WEIGHT: 155.88 LBS | OXYGEN SATURATION: 95 % | DIASTOLIC BLOOD PRESSURE: 59 MMHG | BODY MASS INDEX: 28.69 KG/M2 | TEMPERATURE: 98 F

## 2018-03-29 PROCEDURE — 97162 PT EVAL MOD COMPLEX 30 MIN: CPT

## 2018-03-29 PROCEDURE — 92610 EVALUATE SWALLOWING FUNCTION: CPT

## 2018-03-29 PROCEDURE — G8979 MOBILITY GOAL STATUS: HCPCS | Mod: CJ

## 2018-03-29 PROCEDURE — 25000003 PHARM REV CODE 250: Performed by: HOSPITALIST

## 2018-03-29 PROCEDURE — G8998 SWALLOW D/C STATUS: HCPCS | Mod: CK

## 2018-03-29 PROCEDURE — 25000003 PHARM REV CODE 250: Performed by: INTERNAL MEDICINE

## 2018-03-29 PROCEDURE — 97110 THERAPEUTIC EXERCISES: CPT

## 2018-03-29 PROCEDURE — 99232 SBSQ HOSP IP/OBS MODERATE 35: CPT | Mod: ,,, | Performed by: INTERNAL MEDICINE

## 2018-03-29 PROCEDURE — A4216 STERILE WATER/SALINE, 10 ML: HCPCS | Performed by: HOSPITALIST

## 2018-03-29 PROCEDURE — G8997 SWALLOW GOAL STATUS: HCPCS | Mod: CK

## 2018-03-29 PROCEDURE — G8996 SWALLOW CURRENT STATUS: HCPCS | Mod: CK

## 2018-03-29 PROCEDURE — G8978 MOBILITY CURRENT STATUS: HCPCS | Mod: CL

## 2018-03-29 PROCEDURE — G8980 MOBILITY D/C STATUS: HCPCS | Mod: CL

## 2018-03-29 RX ORDER — OXYCODONE AND ACETAMINOPHEN 5; 325 MG/1; MG/1
1 TABLET ORAL EVERY 4 HOURS PRN
Qty: 10 TABLET | Refills: 0 | Status: ON HOLD | OUTPATIENT
Start: 2018-03-29 | End: 2020-12-20

## 2018-03-29 RX ORDER — AMIODARONE HYDROCHLORIDE 200 MG/1
200 TABLET ORAL DAILY
Qty: 30 TABLET | Refills: 11 | Status: SHIPPED | OUTPATIENT
Start: 2018-03-29 | End: 2020-12-20

## 2018-03-29 RX ORDER — LEVOTHYROXINE SODIUM 25 UG/1
25 TABLET ORAL
Qty: 30 TABLET | Refills: 11 | Status: SHIPPED | OUTPATIENT
Start: 2018-03-30 | End: 2020-02-20

## 2018-03-29 RX ORDER — CEPHALEXIN 500 MG/1
500 CAPSULE ORAL EVERY 6 HOURS
Qty: 12 CAPSULE | Refills: 0 | Status: SHIPPED | OUTPATIENT
Start: 2018-03-29 | End: 2018-04-11

## 2018-03-29 RX ORDER — METOPROLOL TARTRATE 25 MG/1
25 TABLET, FILM COATED ORAL 2 TIMES DAILY
Qty: 60 TABLET | Refills: 11 | Status: SHIPPED | OUTPATIENT
Start: 2018-03-29 | End: 2020-12-20

## 2018-03-29 RX ADMIN — LEVOTHYROXINE SODIUM 25 MCG: 25 TABLET ORAL at 05:03

## 2018-03-29 RX ADMIN — METOPROLOL TARTRATE 25 MG: 25 TABLET ORAL at 09:03

## 2018-03-29 RX ADMIN — AMIODARONE HYDROCHLORIDE 200 MG: 200 TABLET ORAL at 09:03

## 2018-03-29 RX ADMIN — VENLAFAXINE HYDROCHLORIDE 37.5 MG: 37.5 CAPSULE, EXTENDED RELEASE ORAL at 09:03

## 2018-03-29 RX ADMIN — CEPHALEXIN 500 MG: 500 CAPSULE ORAL at 05:03

## 2018-03-29 RX ADMIN — ACETAMINOPHEN 500 MG: 500 TABLET ORAL at 10:03

## 2018-03-29 RX ADMIN — GEMFIBROZIL 600 MG: 600 TABLET ORAL at 09:03

## 2018-03-29 RX ADMIN — OXYCODONE HYDROCHLORIDE AND ACETAMINOPHEN 1 TABLET: 5; 325 TABLET ORAL at 05:03

## 2018-03-29 RX ADMIN — FUROSEMIDE 20 MG: 20 TABLET ORAL at 09:03

## 2018-03-29 RX ADMIN — CEPHALEXIN 500 MG: 500 CAPSULE ORAL at 12:03

## 2018-03-29 RX ADMIN — SODIUM CHLORIDE, PRESERVATIVE FREE 3 ML: 5 INJECTION INTRAVENOUS at 05:03

## 2018-03-29 RX ADMIN — PANTOPRAZOLE SODIUM 40 MG: 40 TABLET, DELAYED RELEASE ORAL at 09:03

## 2018-03-29 NOTE — PLAN OF CARE
[facility] [place]  HOME  HEALTH ORDERS     03/29/2018    Admit to Home Health    Diagnoses:  Active Hospital Problems    Diagnosis  POA    *Symptomatic bradycardia [R00.1]  Yes     Priority: 1 - High    Paroxysmal atrial fibrillation [I48.0]  Yes     Priority: 4      Chronic    Physical deconditioning [R53.81]  Yes     Priority: 7      Chronic    Severe malnutrition [E43]  Yes     Chronic    Chronic anticoagulation [Z79.01]  Not Applicable     Chronic    Anemia of chronic disease [D63.8]  Yes     Chronic    GERD (gastroesophageal reflux disease) [K21.9]  Yes     Chronic    CKD (chronic kidney disease) stage 3, GFR 30-59 ml/min [N18.3]  Yes     Chronic    Chronic diastolic heart failure [I50.32]  Yes     Chronic     12- echo done in [facility]- 1.The left ventricle is normal in size. Global left ventricular systolic function appears to be borderline normal with difficulty evaluating the left ventricular ejection fraction due to increased heart rate, wall motion abnormality, and atrial fibrillation. Left ventricular diastolic function is inderterminate. Regional wall motion analysis shows hypokinesis of anterior wall, basal anterolateral segment and mid anterolateral segment. Wall motion score index is 1.83. Noted mild left ventricular hypertrophy.  Right ventricular systolic function is normal.  Mild mitral annular calcification is noted. Mild calcification of the mitral valve is noted.  Aortic valve appears to be mildly stenotic based on aortic valve area by continuity equation. Mild calcification is noted. Aortic valve area continuity equation is 1.62 cm². LVOT Diameter is 1.9 cms.  Trace pulmonic regurgitation. Mild (1+) aortic regurgitation. Moderate (2+) mitral regurgitation. Moderate to severe (3+) tricuspid regurgitation.   The pulmonary artery systolic pressure is 61 mmHg. Evidence of suspected pulmonary hypertension is detected.      Hyperlipidemia [E78.5]  Yes     Chronic     Depression [F32.9]  Yes     Chronic    Essential hypertension [I10]  Yes     Chronic      Resolved Hospital Problems    Diagnosis Date Resolved POA   No resolved problems to display.       Patient is homebound due to:  Symptomatic bradycardia    Allergies:  Review of patient's allergies indicates:   Allergen Reactions    Ace inhibitors     Diovan hct [valsartan-hydrochlorothiazide] Swelling    Fosinopril sodium Swelling    Codeine Rash    Niaspan extended-release [niacin] Rash       Diet: Cardiac, 2000 rogelio ADA    Acitivities: As tolerated    Nursing:   SN to complete comprehensive assessment including routine vital signs. Instruct on disease process and s/s of complications to report to MD. Review/verify medication list sent home with the patient at time of discharge  and instruct patient/caregiver as needed. Frequency may be adjusted depending on start of care date.    Notify MD if SBP > 160 or < 90; DBP > 90 or < 50; HR > 120 or < 50; Temp > 101    CONSULTS:    PT to evaluate and treat. Evaluate for home safety and equipment needs; Establish/upgrade home exercise program. Perform / instruct on therapeutic exercises, gait training, transfer training, and Range of Motion.    OT to evaluate and treat. Evaluate home environment for safety and equipment needs. Perform/Instruct on transfers, ADL training, ROM, and therapeutic exercises.    ST to evaluate and treat for:  Language  Swallowing  Cognition           WOUND CARE:  Wound spray or saline for wound cleaning with all dressing changes.    All wounds to be measured with first dressing changes and every week.           Other Wounds:   Traumatic wounds                Location:  Bilateral lower legs           Apply the following to wound:            -  Collagenase (Santyl) daily, cover with telfa, wrap with with kerlix dressing          -  ET Consult    Medications: Review discharge medications with patient and family and provide education.     Katherine Hernandez    Home Medication Instructions [facility]:[id number]    Printed on:03/29/18 3203   Medication Information                      albuterol 90 mcg/actuation inhaler  Inhale 2 puffs into the lungs every 6 (six) hours as needed for Wheezing. Rescue             alprazolam (XANAX) 0.5 MG tablet  Take 1 tablet (0.5 mg total) by mouth daily as needed for Anxiety (caution on sedative effects).             amiodarone (PACERONE) 200 MG Tab  Take 1 tablet (200 mg total) by mouth once daily.             apixaban (ELIQUIS) 5 mg Tab  Take 5 mg by mouth 2 (two) times daily.              CA CARB/D3/MAG OX//ANNA/ZN (CALTRATE + D3 PLUS MINERALS ORAL)  Take 1 tablet by mouth once daily.              cephALEXin (KEFLEX) 500 MG capsule  Take 1 capsule (500 mg total) by mouth every 6 (six) hours.             furosemide (LASIX) 20 MG tablet  Take 1 tablet (20 mg total) by mouth once daily.             gemfibrozil (LOPID) 600 MG tablet  Take 300 mg by mouth. Take 1/2 tablet QHS             levothyroxine (SYNTHROID) 25 MCG tablet  Take 1 tablet (25 mcg total) by mouth before breakfast.             lidocaine (LIDODERM) 5 %  Place 1 patch onto the skin once daily. Remove & Discard patch within 12 hours or as directed by MD             losartan (COZAAR) 25 MG tablet  Take 1 tablet (25 mg total) by mouth every evening.             metoprolol tartrate (LOPRESSOR) 25 MG tablet  Take 1 tablet (25 mg total) by mouth 2 (two) times daily.             multivitamin (THERAGRAN) per tablet  Take 1 tablet by mouth once daily.             omeprazole (PRILOSEC) 20 MG capsule  Take 20 mg by mouth daily as needed.              ondansetron (ZOFRAN-ODT) 4 MG TbDL  Take 1 tablet (4 mg total) by mouth every 6 (six) hours as needed.             oxyCODONE-acetaminophen (PERCOCET) 5-325 mg per tablet  Take 1 tablet by mouth every 4 (four) hours as needed (pain 5-10/10 pain scale).             polycarbophil (FIBERCON) 625 mg tablet  Take 625 mg by mouth once daily.              rosuvastatin (CRESTOR) 10 MG tablet  Take 10 mg by mouth every evening.             senna-docusate 8.6-50 mg (SENNA WITH DOCUSATE SODIUM) 8.6-50 mg per tablet  Take 1 tablet by mouth once daily.             venlafaxine (EFFEXOR-XR) 37.5 MG 24 hr capsule  Take 1 capsule (37.5 mg total) by mouth once daily.             vitamins  A,C,E-zinc-copper (PRESERVISION AREDS) 7,160-113-100 unit-mg-unit Tab  Take 1 tablet by mouth 2 (two) times daily.                        _________________________________  Cindy Ramos MD  03/29/2018

## 2018-03-29 NOTE — PT/OT/SLP EVAL
Speech Language Pathology Evaluation  Bedside Swallow    Patient Name:  Katherine Hernandez   MRN:  [id number]  Admitting Diagnosis: Symptomatic bradycardia    Recommendations:                 General Recommendations:  Follow-up not indicated  Diet recommendations:  Mechanical soft, No Rice, Nectar Thick   Aspiration Precautions: 1 bite/sip at a time, Alternating bites/sips, Assistance with meals and Assistance with thickening liquids, Avoid talking while eating, Eliminate distractions, Feed only when awake/alert, HOB to 90 degrees, Meds crushed in puree, Remain upright 30 minutes post meal, Small bites/sips and Strict aspiration precautions   General Precautions: Standard, fall, pacemaker, nectar thick, dental soft    History:     HPI: Mrs. Katherine Hernandez is a 86 y.o. female with essential hypertension, hyperlipidemia (LDL 82.6 Mar 2018), chronic diastolic heart failure (LVEF unknown), CKD Stage 3, paroxysmal atrial fibrillation ([id number] score 4) on chronic anticoagulation, anemia of chronic disease, GERD, and depression who presented initially to [facility] ED yesterday with complaints of weakness for three days.  She was admitted to the Hospital Medicine service at that facility.  Her daughter has a blood pressure cuff at home and found that her heart rate was 42 BPMs.  She did not complain of any shortness of breath, chest pain, palpitations, lightheadedness, nor any dizziness.  She does take metoprolol at home and her daughter states that there is no way that she may have taken too many pills and she is the one who administers Mrs. Hernandez her medications.       Past Medical History:   Diagnosis Date    Anxiety     Arthritis     Bronchitis 11/13/2014    CAD (coronary artery disease)     Chronic back pain     Chronic diastolic CHF (congestive heart failure)     GERD (gastroesophageal reflux disease)     Hyperlipidemia     Hypertension     Paroxysmal A-fib        Past Surgical History:  "  Procedure Laterality Date    CERVICAL SPINE SURGERY      CHOLECYSTECTOMY      HIP SURGERY         Social History: Patient lives with dtr (Anca).    Chest X-Rays: 03/25/2018: "The lungs are clear.  There is no pneumothorax or pleural fluid.  The cardiac silhouette is not enlarged.  There is calcification of the aorta.  The osseous structures demonstrate degenerative change."    Prior diet: mechanical soft with nectar-thick liquids. Thin liquids occasionally per daughter.    Subjective     Pt alert, sitting upright in bedside chair. Pt's dtr stated, "She's doing great with her diet." Pt reportedly tolerating mechanical soft diet with nectar-thick liquids without difficulty. RN reports pt takes extended time with PO intake--dtr reports this is pt's baseline.    Pain/Comfort:  · Pain Rating 1: 0/10    Objective:     Oral Musculature Evaluation  · Oral Musculature: WFL  · Dentition: other (see comments) (lower molars missing; edentulous upper--has dentures at home but they do not fit well)  · Secretion Management: adequate  · Mucosal Quality: good  · Mandibular Strength and Mobility: WFL  · Oral Labial Strength and Mobility: WFL  · Lingual Strength and Mobility: WFL  · Velar Elevation: WFL  · Buccal Strength and Mobility: WFL  · Volitional Cough: Weak  · Volitional Swallow: decreased hyolaryngeal elevation/excursion noted upon palpation  · Voice Prior to PO Intake: clear    Bedside Swallow Eval:   Consistencies Assessed:  · Nectar thick liquids (x4 straw sips)  · Puree (x3 tsp bites)  · Soft solids (x1 1/2 piece of shaylee cracker)     Oral Phase:   · WFL  · Prolonged mastication and slow A-P transport noted with puree and soft solids    Pharyngeal Phase:   · decreased hyolaryngeal excursion to palpation  · no overt clinical signs/symptoms of aspiration  · no overt clinical signs/symptoms of pharyngeal dysphagia    Treatment: Discussed in detail with pt and pt's dtr re: swallow study results, diet recs, and swallow " precautions. Pt and pt's dtr verbalized understanding and agreement of all information provided. Discusses swallow study results/recs with pt's MADISYN Arndt    Assessment:     Katherine Hernandez is a 86 y.o. female presents with SLP diagnosis of Oropharyngeal Dysphagia c/b inefficient mastication of solids, slow oral transit time, and decreased hyolaryngeal elevation/excursion to palpation. No overt s/s of aspiration or airway threat noted with nectar thickened liquids, purees, or dental soft solids. Swallow function judged to be safe for oral intake of mechanical soft diet and nectar-thick liquids with supervision/assistance and swallow precautions. No additional acute ST services warranted at this time. Pt may benefit from  speech therapy services in order to address dysphagia.    Goals:    SLP Goals     Not on file          Multidisciplinary Problems (Resolved)        Problem: SLP Goal    Goal Priority Disciplines Outcome   SLP Goal   (Resolved)     SLP Outcome(s) achieved   Description:  SLP Short Term Goals:  1. Patient will successfully participate in clinical swallow evaluation and tolerate po trials with no overt s/s of aspiration. --MET 3/29                    Plan:   · Plan of Care reviewed with:  patient, daughter, other (see comments) (MADISYN Arndt)   · SLP Follow-Up:  No       Discharge recommendations:  home health PT and home health speech therapy  Barriers to Discharge:  None    Time Tracking:     SLP Treatment Date:   03/29/18  Speech Start Time:  1035  Speech Stop Time:  1050     Speech Total Time (min):  15 min    Billable Minutes: Eval Swallow and Oral Function 15 minutes    Cha Bhakta CCC-SLP  03/29/2018

## 2018-03-29 NOTE — PT/OT/SLP DISCHARGE
Physical Therapy Discharge Summary    Name: Katherine Hernandez  MRN: [id number]   Principal Problem: Symptomatic bradycardia     Patient Discharged from acute Physical Therapy on 3/29/18.  Please refer to prior PT notes for functional status.     Assessment:     Patient was discharged unexpectedly.  Information required to complete an accurate discharge summary is unknown.  Refer to therapy initial evaluation and last progress note for initial and most recent functional status and goal achievement.  Recommendations made may be found in medical record.    Objective:     GOALS:    Physical Therapy Goals     Not on file          Multidisciplinary Problems (Resolved)        Problem: Physical Therapy Goal    Goal Priority Disciplines Outcome Goal Variances Interventions   Physical Therapy Goal   (Resolved)     PT/OT, PT Outcome(s) achieved     Description:  Goals to be met by: 18     Patient will increase functional independence with mobility by performin. Supine to sit with Stand-by Assistance  2. Rolling to Left and Right with Stand-by Assistance  3. Sit to stand transfer with Stand-by Assistance using RW  4. Bed to chair transfer with Stand-by Assistance using Rolling Walker  5. Gait  x 50 feet with Stand-by Assistance using Rolling Walker   6. Upper/Lower extremity exercise program x 20 reps per handout, with SBA                      Reasons for Discontinuation of Therapy Services  Transfer to alternate level of care.      Plan:     Patient Discharged to: Home with Home Health Service.    Matilde Suarez, PT  3/29/2018

## 2018-03-29 NOTE — PROGRESS NOTES
WRITTEN DISCHARGE INFORMATION:  Follow-up Information     Jacobo Gaspar MD On 4/9/2018.    Specialty:  Internal Medicine  Why:  Outpatient Services PCP Follow-Up Monday at 1:20 PM  Contact information:  [address] [address]  [phone]             [facility] [place] On 3/30/2018.    Specialty:  Home Health Services  Contact information:  [address]  [place] [address]  [phone]             Wilder Collins MD On 4/6/2018.    Specialties:  INTERVENTIONAL CARDIOLOGY, Cardiology  Why:  out patient services:  8:15 AM follow up from the hospital. Had pace maker placed. Staples to be removed in 2 weeks from 3/28/2018  Contact information:  [address]   [id number]  [facility] [address]  [phone]                 Things that YOU are responsible for to Manage Your Care At Home:  1. Getting your prescriptions filled.  2. Taking you medications as directed. DO NOT MISS ANY DOSES!  3. Going to your follow-up doctor appointments. This is important because it allows the doctor to monitor your progress and to determine if any changes need to be made to your treatment plan.                                                          Help at Home  After discharge for assistance [facility] On Call Nurse Care Line 24/7 assistance  [phone]   Please answer any surveys and or calls you may receive from [facility].  Sincerely, Your [facility] Healthcare Manager is,  Samia Fried RN [facility] [phone]

## 2018-03-29 NOTE — PLAN OF CARE
03/29/18 1148   Final Note   Assessment Type Final Discharge Note   Discharge Disposition Home   What phone number can be called within the next 1-3 days to see how you are doing after discharge? (see chart)   Hospital Follow Up  Appt(s) scheduled? Yes   Discharge plans and expectations educations in teach back method with documentation complete? Yes   Right Care Referral Info   Post Acute Recommendation Home-care

## 2018-03-29 NOTE — PROCEDURES
"Katherine Hernandez is a 86 y.o. female patient.    Temp: 98.1 °F (36.7 °C) (03/28/18 2008)  Pulse: 73 (03/28/18 2008)  Resp: 18 (03/28/18 2008)  BP: 133/60 (03/28/18 2008)  SpO2: 98 % (03/28/18 2008)  Weight: 73.7 kg (162 lb 7.7 oz) (03/28/18 1200)  Height: 5' 2" (157.5 cm) (03/28/18 1200)       Procedures     Pacemaker   Dr De Jesus  Pre-op Dx SSS, tachy-tejal syndrome  Post-op Dx same  Specimen none  EBL < 50 cc    3/28/18 St Boris dual pacemaker placed in the left SC vein    Post-op CXR  and ABX  Sling overnight    Jj De Jesus  3/28/2018    "

## 2018-03-29 NOTE — PROGRESS NOTES
[facility]  Cardiology  Progress Note    Patient Name: Katherine Hernandez  MRN: [id number]  Admission Date: 3/28/2018  Hospital Length of Stay: 1 days  Code Status: Full Code   Attending Physician: Cindy Ramos MD   Primary Care Physician: Jacobo Gaspar MD  Expected Discharge Date: 3/28/2018  Principal Problem:Symptomatic bradycardia    Subjective:     Hospital Course:   Denies CP or SOB    Interval History:     Review of Systems   Constitution: Negative for decreased appetite.   HENT: Negative for ear discharge.    Eyes: Negative for blurred vision.   Respiratory: Negative for hemoptysis.    Endocrine: Negative for polyphagia.   Hematologic/Lymphatic: Negative for adenopathy.   Skin: Negative for color change.   Musculoskeletal: Negative for joint swelling.   Neurological: Negative for brief paralysis.   Psychiatric/Behavioral: Negative for hallucinations.     Objective:     Vital Signs (Most Recent):  Temp: 98 °F (36.7 °C) (03/29/18 0757)  Pulse: 62 (03/29/18 0757)  Resp: 17 (03/29/18 0757)  BP: (!) 117/57 (03/29/18 0757)  SpO2: (!) 93 % (03/29/18 0757) Vital Signs (24h Range):  Temp:  [97.2 °F (36.2 °C)-98.3 °F (36.8 °C)] 98 °F (36.7 °C)  Pulse:  [62-73] 62  Resp:  [17-20] 17  SpO2:  [93 %-98 %] 93 %  BP: (117-139)/(57-63) 117/57     Weight: 70.7 kg (155 lb 13.8 oz)  Body mass index is 28.51 kg/m².     SpO2: (!) 93 %  O2 Device (Oxygen Therapy): room air      Intake/Output Summary (Last 24 hours) at 03/29/18 0816  Last data filed at 03/29/18 0600   Gross per 24 hour   Intake              240 ml   Output                0 ml   Net              240 ml       Lines/Drains/Airways     Pressure Ulcer                 Pressure Injury 03/26/18 1330 Right medial Buttocks Stage 2 2 days          Peripheral Intravenous Line                 Peripheral IV - Single Lumen 03/25/18 1700 Left Antecubital 3 days                Physical Exam   Constitutional: She is oriented to person, place, and time. She appears  well-developed and well-nourished.   HENT:   Head: Normocephalic and atraumatic.   Eyes: Conjunctivae are normal. Pupils are equal, round, and reactive to light.   Neck: Normal range of motion. Neck supple.   Cardiovascular: Normal rate, normal heart sounds and intact distal pulses.    Pulmonary/Chest: Effort normal and breath sounds normal.   Abdominal: Soft. Bowel sounds are normal.   Musculoskeletal: Normal range of motion.   Neurological: She is alert and oriented to person, place, and time.   Skin: Skin is warm and dry.   PPM site clean and dry    Significant Labs: All pertinent lab results from the last 24 hours have been reviewed.    Significant Imaging: Echocardiogram: 2D echo with color flow doppler: No results found for this or any previous visit.    Assessment and Plan:     Brief HPI:     * Symptomatic bradycardia             Physical deconditioning             CKD (chronic kidney disease) stage 3, GFR 30-59 ml/min             Paroxysmal atrial fibrillation    PPM dressing changed. Ok for d/c today. Needs staples out in 2 weeks either by his regular cardiologist or myself. Rx for keflex and percocet given to patient. Amiodarone and metoprolol resumed. Ok to resume eliquis tonight        Chronic diastolic heart failure    Volume status stable        Hyperlipidemia             Essential hypertension                 VTE Risk Mitigation     None          Jj De Jesus MD  Cardiology  [facility]

## 2018-03-29 NOTE — PLAN OF CARE
"TN completed discharge needs assessment. TN provided and reviewed with patient "Blue My Health Packet" , "Help At Home" and "Discharge Planning Begins on Admission" handouts. TN discussed with patient the things the patient is responsible for to manage patient's  healthcare at home. Patient verbalized understanding & teachback implemented. Patient prefers morning doctor appointments.     03/29/18 1027   Discharge Assessment   Assessment Type Discharge Planning Assessment   Confirmed/corrected address and phone number on facesheet? Yes   Assessment information obtained from? Patient   Communicated expected length of stay with patient/caregiver no   Prior to hospitilization cognitive status: Alert/Oriented   Prior to hospitalization functional status: Independent;Assistive Equipment   Current cognitive status: Alert/Oriented   Current Functional Status: Independent;Assistive Equipment   Lives With child(jodi), adult   Able to Return to Prior Arrangements yes   Is patient able to care for self after discharge? Yes   Who are your caregiver(s) and their phone number(s)? (dtr, Anca Hernandez [phone] or [phone])   Patient's perception of discharge disposition admitted as an inpatient   Readmission Within The Last 30 Days other (see comments)  (trnasferred to [facility])   Patient currently being followed by outpatient case management? No   Patient currently receives any other outside agency services? Yes   Name and contact number of agency or person providing outside services ([facility])   Is it the patient/care giver preference to resume care with the current outside agency? Yes   Equipment Currently Used at Home commode;bath bench;walker, rolling;hospital bed   Do you have any problems affording any of your prescribed medications? No   Is the patient taking medications as prescribed? yes   Does the patient have transportation home? Yes   Transportation Available family or friend will " provide   Does the patient receive services at the [facility]? No   Discharge Plan A Home with family;Home Health   Discharge Plan B Home with family;Home Health   Patient/Family In Agreement With Plan yes   Sign and Symptoms for Post Operative procedure ( daughter, Anca present) with teach back.    [address] [place] [id number]  Phone: [phone] Fax: [phone]    [facility] - [place], [place] - [address]  [address]  [place] [address]  Phone: [phone] Fax: [phone]    .Samia Fried RN, BSN, STN [facility]  3/29/2018

## 2018-03-29 NOTE — PLAN OF CARE
Problem: Physical Therapy Goal  Goal: Physical Therapy Goal  Goals to be met by: 18     Patient will increase functional independence with mobility by performin. Supine to sit with Stand-by Assistance  2. Rolling to Left and Right with Stand-by Assistance  3. Sit to stand transfer with Stand-by Assistance using RW  4. Bed to chair transfer with Stand-by Assistance using Rolling Walker  5. Gait  x 50 feet with Stand-by Assistance using Rolling Walker   6. Upper/Lower extremity exercise program x 20 reps per handout, with SBA    Please assist pt OOB>chair for all meals.  Pt is min A using a RW for bed<>chair transfers.

## 2018-03-29 NOTE — PLAN OF CARE
Problem: SLP Goal  Goal: SLP Goal  SLP Short Term Goals:  1. Patient will successfully participate in clinical swallow evaluation and tolerate po trials with no overt s/s of aspiration. --MET 3/29  Outcome: Outcome(s) achieved Date Met: 03/29/18  3/29/2018: Swallow study completed this AM. No overt s/s of aspiration or airway threat noted with nectar thickened liquids (pt's baseline), purees, or dental soft consistencies. Pt presenting with prolonged mastication and slow A-P transport with puree and dental soft consistencies. Pt's dtr at bedside reports this is pt's baseline. RECS: Continue [facility] soft diet w/ NECTAR thickened liquids with 1:1 supervision and strict adherence to standard swallow precautions. No additional ST services warranted at this time.  RAQUEL Parrish. CCC-SLP  Speech-Language Pathologist

## 2018-03-29 NOTE — PROGRESS NOTES
Discharge Preparation:  Note that patient sitting up in chair with daughter at chairside;   Patient is confused however daughter is aware that patient is being discharged and in agreement with same;   Case management clearance;   Left IV and telemetry monitor removed;  No redness or discomfort to IV site;   Reviewed discharge plan with family; Daughter voiced understanding of d/c plan;    Will call hospital transport when patient finishes eating;

## 2018-03-29 NOTE — PROGRESS NOTES
TN sent home health orders through [facility] to resume [facility]..Samia Fried RN, BSN, STN CCM  3/29/2018

## 2018-03-29 NOTE — PT/OT/SLP EVAL
Physical Therapy Evaluation    Patient Name:  Katherine Hernandez   MRN:  [id number]    Recommendations:     Discharge Recommendations:  home health PT (with family supervision and assistance)   Discharge Equipment Recommendations: none   Barriers to discharge: Pt with decreased mobility.    Assessment:     Katherine Hernandez is a 86 y.o. female admitted with a medical diagnosis of Symptomatic bradycardia.  She presents with the following impairments/functional limitations:  weakness, impaired endurance, impaired self care skills, impaired functional mobilty, gait instability, impaired balance, decreased upper extremity function, decreased lower extremity function, decreased safety awareness, pain, decreased ROM, impaired skin.    Rehab Prognosis:  Fair+; patient would benefit from acute skilled PT services to address these deficits and reach maximum level of function.      Recent Surgery: Procedure(s) (LRB):  Pacemaker DC new (N/A)      Plan:     During this hospitalization, patient to be seen 6 x/week (M-F; S or S) to address the above listed problems via gait training, therapeutic activities, therapeutic exercises  · Plan of Care Expires:  04/12/18   Plan of Care Reviewed with: patient, daughter    Subjective     Communicated with nurse Arndt prior to session.  Patient found in bed upon PT entry to room, agreeable to evaluation.      Chief Complaint: Pt fearful of falling.   Patient comments/goals: to go home  Pain/Comfort:  · Pain Rating 1:  (Pt c/o back pain.)  · Pain Addressed 1: Pre-medicate for activity    Living Environment:  Pt reported living with daughter in a SS house with a ramp.  While pt's daughter is at work, there is a cousin that comes in to assist.  Prior to admission, patients level of function was mod I with household ambulation using a RW prior to Feb 2018.  Daughter reported that pt had a fall ~1 month ago and required SNF placement.  Pt was able to return home and currently receiving home health  services (PT, OT, ST, and nurse).  Pt is still recovering from her fall and requires assistance from family.  Patient has the following equipment: bedside commode, bath bench, hospital bed, walker, rolling, wheelchair.  Upon discharge, patient will have assistance from family.    Objective:     Patient found with: peripheral IV, telemetry     General Precautions: Standard, fall, pacemaker (3/28/18)   Orthopedic Precautions:N/A   Braces: N/A     Exams:  · Cognitive Exam:  Patient is oriented to Person and Place and follows 75 % of simple commands.  Pt is [place].     · Gross Motor Coordination:  WFL  · Postural Exam:  Patient presented with the following abnormalities:    · -       Rounded shoulders  · -       Forward head  · -       Kyphosis  · Sensation:    · -       Intact  light/touch BUE/BLE  · Skin Integrity/Edema:      · -       Skin integrity: dressing to RLE noted, daughter reported an old skin tear PTA  · -       Edema: None noted BUE/BLE  · BUE ROM: WFL except L shoulder FROM NT 2* recent pacemaker placement  · BUE Strength: WFL  · RLE ROM: WFL  · RLE Strength: WFL  · LLE ROM: WFL  · LLE Strength: WFL    Functional Mobility:  · Bed Mobility:     · Scooting: minimum assistance and moderate assistance to scoot EOB  · Supine to Sit: moderate assistance with HOB elevated; Upon sitting, pt with posterior LOB and required trunk support.   · Transfers:     · Sit to Stand:  minimum assistance with rolling walker x 2 trials  · Bed to Chair: minimum assistance with  rolling walker  using  Step Transfer  · Gait: Pt ambulated ~4-5 steps from bed>chair with min A/RW.  Pt with forward flexed posture, decreased step length, and kiesha.  Pt with fear of falling and became anxious.  Pt with decreased safety awareness.   · Balance: Pt with fair+ sit balance and fair stand balance.     AM-PAC 6 CLICK MOBILITY  Total Score:14       Therapeutic Activities and Exercises:  BUE seated therex 2 sets of 10 reps: butterfly, elbow  flex/ext, and finger flex/ext  BLE seated therex 2 sets of 10 reps: hip flex, LAQ, HS, and AP    Pt/daugther educated on LUE precautions s/p pacemaker placement.  Daughter verbalized good understanding.     Patient left up in chair with all lines intact, call button in reach, nurse Yris notified and daughter present.    GOALS:    Physical Therapy Goals        Problem: Physical Therapy Goal    Goal Priority Disciplines Outcome Goal Variances Interventions   Physical Therapy Goal     PT/OT, PT      Description:  Goals to be met by: 18     Patient will increase functional independence with mobility by performin. Supine to sit with Stand-by Assistance  2. Rolling to Left and Right with Stand-by Assistance  3. Sit to stand transfer with Stand-by Assistance using RW  4. Bed to chair transfer with Stand-by Assistance using Rolling Walker  5. Gait  x 50 feet with Stand-by Assistance using Rolling Walker   6. Upper/Lower extremity exercise program x 20 reps per handout, with SBA                      History:     Past Medical History:   Diagnosis Date    Anxiety     Arthritis     Bronchitis 2014    CAD (coronary artery disease)     Chronic back pain     Chronic diastolic CHF (congestive heart failure)     GERD (gastroesophageal reflux disease)     Hyperlipidemia     Hypertension     Paroxysmal A-fib        Past Surgical History:   Procedure Laterality Date    CERVICAL SPINE SURGERY      CHOLECYSTECTOMY      HIP SURGERY         Clinical Decision Making:     History  Co-morbidities and personal factors that may impact the plan of care Examination  Body Structures and Functions, activity limitations and participation restrictions that may impact the plan of care Clinical Presentation   Decision Making/ Complexity Score   Co-morbidities:   [] Time since onset of injury / illness / exacerbation  [x] Status of current condition  [x]Patient's cognitive status and safety concerns    [x] Multiple Medical  Problems (see med hx)  Personal Factors:   [x] Patient's age  [] Prior Level of function   [] Patient's home situation (environment and family support)  [] Patient's level of motivation  [] Expected progression of patient      HISTORY:(criteria)    [] 80204 - no personal factors/history    [] 44232 - has 1-2 personal factor/comorbidity     [x] 56230 - has >3 personal factor/comorbidity     Body Regions:  [] Objective examination findings  [] Head     []  Neck  [] Trunk   [x] Upper Extremity  [] Lower Extremity    Body Systems:  [x] For communication ability, affect, cognition, language, and learning style: the assessment of the ability to make needs known, consciousness, orientation (person, place, and time), expected emotional /behavioral responses, and learning preferences (eg, learning barriers, education  needs)  [x] For the neuromuscular system: a general assessment of gross coordinated movement (eg, balance, gait, locomotion, transfers, and transitions) and motor function  (motor control and motor learning)  [x] For the musculoskeletal system: the assessment of gross symmetry, gross range of motion, gross strength, height, and weight  [x] For the integumentary system: the assessment of pliability(texture), presence of scar formation, skin color, and skin integrity  [] For cardiovascular/pulmonary system: the assessment of heart rate, respiratory rate, blood pressure, and edema     Activity limitations:    [x] Patient's cognitive status and saf ety concerns          [x] Status of current condition      [] Weight bearing restriction  [] Cardiopulmunary Restriction    Participation Restrictions:   [] Goals and goal agreement with the patient     [] Rehab potential (prognosis) and probable outcome      Examination of Body System: (criteria)    [] 84885 - addressing 1-2 elements    [] 63396 - addressing a total of 3 or more elements     [x] 16856 -  Addressing a total of 4 or more elements         Clinical  Presentation: (criteria)  Evolving - 18355     On examination of body system using standardized tests and measures patient presents with 4 or more elements from any of the following: body structures and functions, activity limitations, and/or participation restrictions.  Leading to a clinical presentation that is considered evolving with changing characteristics                              Clinical Decision Making  (Eval Complexity):  Moderate - 98235     Time Tracking:     PT Received On: 03/29/18  PT Start Time: 0934     PT Stop Time: 0958  PT Total Time (min): 24 min     Billable Minutes: Evaluation 14 min and Therapeutic Exercise 10 min      Matilde Suarez, PT  03/29/2018

## 2018-03-29 NOTE — SUBJECTIVE & OBJECTIVE
Interval History:     Review of Systems   Constitution: Negative for decreased appetite.   HENT: Negative for ear discharge.    Eyes: Negative for blurred vision.   Respiratory: Negative for hemoptysis.    Endocrine: Negative for polyphagia.   Hematologic/Lymphatic: Negative for adenopathy.   Skin: Negative for color change.   Musculoskeletal: Negative for joint swelling.   Neurological: Negative for brief paralysis.   Psychiatric/Behavioral: Negative for hallucinations.     Objective:     Vital Signs (Most Recent):  Temp: 98 °F (36.7 °C) (03/29/18 0757)  Pulse: 62 (03/29/18 0757)  Resp: 17 (03/29/18 0757)  BP: (!) 117/57 (03/29/18 0757)  SpO2: (!) 93 % (03/29/18 0757) Vital Signs (24h Range):  Temp:  [97.2 °F (36.2 °C)-98.3 °F (36.8 °C)] 98 °F (36.7 °C)  Pulse:  [62-73] 62  Resp:  [17-20] 17  SpO2:  [93 %-98 %] 93 %  BP: (117-139)/(57-63) 117/57     Weight: 70.7 kg (155 lb 13.8 oz)  Body mass index is 28.51 kg/m².     SpO2: (!) 93 %  O2 Device (Oxygen Therapy): room air      Intake/Output Summary (Last 24 hours) at 03/29/18 0816  Last data filed at 03/29/18 0600   Gross per 24 hour   Intake              240 ml   Output                0 ml   Net              240 ml       Lines/Drains/Airways     Pressure Ulcer                 Pressure Injury 03/26/18 1330 Right medial Buttocks Stage 2 2 days          Peripheral Intravenous Line                 Peripheral IV - Single Lumen 03/25/18 1700 Left Antecubital 3 days                Physical Exam   Constitutional: She is oriented to person, place, and time. She appears well-developed and well-nourished.   HENT:   Head: Normocephalic and atraumatic.   Eyes: Conjunctivae are normal. Pupils are equal, round, and reactive to light.   Neck: Normal range of motion. Neck supple.   Cardiovascular: Normal rate, normal heart sounds and intact distal pulses.    Pulmonary/Chest: Effort normal and breath sounds normal.   Abdominal: Soft. Bowel sounds are normal.   Musculoskeletal: Normal  range of motion.   Neurological: She is alert and oriented to person, place, and time.   Skin: Skin is warm and dry.   PPM site clean and dry    Significant Labs: All pertinent lab results from the last 24 hours have been reviewed.    Significant Imaging: Echocardiogram: 2D echo with color flow doppler: No results found for this or any previous visit.

## 2018-03-30 PROBLEM — R53.83 FATIGUE: Status: ACTIVE | Noted: 2018-03-30

## 2018-03-30 NOTE — DISCHARGE SUMMARY
[facility] Hospital Medicine  Discharge Summary      Patient Name: Katherine Hernandez  MRN: [id number]  Admission Date: 3/28/2018  Hospital Length of Stay: 1 days  Discharge Date and Time: 3/29/2018  1:19 PM  Attending Physician: No att. providers found   Discharging Provider: Cindy Ramos MD  Primary Care Provider: Jacobo Gaspar MD      HPI:   Mrs. Katherine Hernandez is a 86 y.o. Woman with essential hypertension, hyperlipidemia (LDL 82.6 Mar 2018), chronic diastolic heart failure (LVEF unknown), CKD Stage 3, paroxysmal atrial fibrillation ([id number] score 4) on chronic anticoagulation, anemia of chronic disease, GERD, and depression who presented initially to [facility] ED 3/26/18 with complaints of weakness for three days.  She was admitted to the Hospital Medicine service at that facility.  Her daughter has a blood pressure cuff at home and found that her heart rate was 42 BPMs.  She did not complain of any shortness of breath, chest pain, palpitations, lightheadedness, nor any dizziness.  She does take metoprolol at home and her daughter states that there is no way that she may have taken too many pills and she is the one who administers Mrs. Hernandez her medications.      Procedure(s) (LRB):  Pacemaker DC new (N/A)      Hospital Course:   Patient was on metoprolol for her atrial fibrillation and was noted to be bradycardic which was the likely etiology of her symptoms. EKG w/ sinus bradycardia without evidence of acute ischemia.  She was discontinued from the metoprolol but her heart rate increased into the 120's with what was reported to be atrial fibrillation with rapid ventricular response. She was given a dose of intravenous metoprolol and she was later found to be in a junctional rhythm. She was not on any other AV-arturo blockers at this time.  I'm curious if she has sick-sinus syndrome or if this is simply medication-induced. [facility], Dr. Jw Healy,  recommended the patient be transferred to a facility where a permanent pacemaker could be placed. She appears to have SSS. Cardiology recommended holding medications for A fib until PPM placed. On 3/28/18 she had a [employer] dual pacemaker placed in the left SC vein. She was stable overnight and stable for discharge home. Restarted amio and metoprolol on DC. She needs staples out in 2 weeks either by her regular cardiologist or Dr. De Jesus. Rx for keflex and percocet given to patient at discharge.  PT recommended home health PT. She will also have home health wound care.        Final Active Diagnoses:    Diagnosis Date Noted POA    PRINCIPAL PROBLEM:  Symptomatic bradycardia [R00.1] 03/27/2018 Yes    Paroxysmal atrial fibrillation [I48.0] 12/14/2017 Yes     Chronic    Physical deconditioning [R53.81] 03/26/2018 Yes     Chronic    Severe malnutrition [E43] 03/28/2018 Yes     Chronic    Chronic anticoagulation [Z79.01] 03/27/2018 Not Applicable     Chronic    Anemia of chronic disease [D63.8] 03/27/2018 Yes     Chronic    GERD (gastroesophageal reflux disease) [K21.9] 03/27/2018 Yes     Chronic    CKD (chronic kidney disease) stage 3, GFR 30-59 ml/min [N18.3] 12/27/2017 Yes     Chronic    Chronic diastolic heart failure [I50.32] 12/14/2017 Yes     Chronic    Hyperlipidemia [E78.5] 04/19/2017 Yes     Chronic    Depression [F32.9] 03/03/2017 Yes     Chronic    Essential hypertension [I10] 07/30/2014 Yes     Chronic      Problems Resolved During this Admission:    Diagnosis Date Noted Date Resolved POA       Discharged Condition: stable    Disposition: Home-Health Care Svc    Follow Up:  Follow-up Information     Jacobo Gaspar MD On 4/9/2018.    Specialty:  Internal Medicine  Why:  Outpatient Services PCP Follow-Up Monday at 1:20 PM  Contact information:  [address]  [place] [address] [phone]             [facility] On 3/30/2018.    Specialty:  Home Health Services  Why:  Home  Health  Contact information:  [address]  [place] [address]  [phone]             Wilder Collins MD On 4/6/2018.    Specialties:  INTERVENTIONAL CARDIOLOGY, Cardiology  Why:  out patient services:  8:15 AM follow up from the hospital. Had pace maker placed. Staples to be removed in 2 weeks from 3/28/2018  Contact information:  [address]   [id number]  [facility] [address]  [phone]                 Patient Instructions:     Diet diabetic     Diet Cardiac     Activity as tolerated     Notify your health care provider if you experience any of the following:  increased confusion or weakness     Notify your health care provider if you experience any of the following:  persistent dizziness, light-headedness, or visual disturbances     Notify your health care provider if you experience any of the following:  worsening rash     Notify your health care provider if you experience any of the following:  severe persistent headache     Notify your health care provider if you experience any of the following:  difficulty breathing or increased cough     Notify your health care provider if you experience any of the following:  redness, tenderness, or signs of infection (pain, swelling, redness, odor or green/yellow discharge around incision site)     Notify your health care provider if you experience any of the following:  severe uncontrolled pain     Notify your health care provider if you experience any of the following:  persistent nausea and vomiting or diarrhea     Notify your health care provider if you experience any of the following:  temperature >100.4       Medications:  Reconciled Home Medications:      Medication List      START taking these medications    amiodarone 200 MG Tab  Commonly known as:  PACERONE  Take 1 tablet (200 mg total) by mouth once daily.     cephALEXin 500 MG capsule  Commonly known as:  KEFLEX  Take 1 capsule (500 mg total) by mouth  every 6 (six) hours.     levothyroxine 25 MCG tablet  Commonly known as:  SYNTHROID  Take 1 tablet (25 mcg total) by mouth before breakfast.  Start taking on:  3/30/2018     metoprolol tartrate 25 MG tablet  Commonly known as:  LOPRESSOR  Take 1 tablet (25 mg total) by mouth 2 (two) times daily.        CHANGE how you take these medications    oxyCODONE-acetaminophen 5-325 mg per tablet  Commonly known as:  PERCOCET  Take 1 tablet by mouth every 4 (four) hours as needed (pain 5-10/10 pain scale).  What changed:  · when to take this  · reasons to take this        CONTINUE taking these medications    albuterol 90 mcg/actuation inhaler  Inhale 2 puffs into the lungs every 6 (six) hours as needed for Wheezing. Rescue     ALPRAZolam 0.5 MG tablet  Commonly known as:  XANAX  Take 1 tablet (0.5 mg total) by mouth daily as needed for Anxiety (caution on sedative effects).     CALTRATE + D3 PLUS MINERALS ORAL     ELIQUIS 5 mg Tab  Generic drug:  apixaban     furosemide 20 MG tablet  Commonly known as:  LASIX  Take 1 tablet (20 mg total) by mouth once daily.     gemfibrozil 600 MG tablet  Commonly known as:  LOPID     lidocaine 5 %  Commonly known as:  LIDODERM  Place 1 patch onto the skin once daily. Remove & Discard patch within 12 hours or as directed by MD     losartan 25 MG tablet  Commonly known as:  COZAAR  Take 1 tablet (25 mg total) by mouth every evening.     multivitamin per tablet  Commonly known as:  THERAGRAN     omeprazole 20 MG capsule  Commonly known as:  PRILOSEC     ondansetron 4 MG Tbdl  Commonly known as:  ZOFRAN-ODT  Take 1 tablet (4 mg total) by mouth every 6 (six) hours as needed.     polycarbophil 625 mg tablet  Commonly known as:  FIBERCON     PRESERVISION AREDS 7,160-113-100 unit-mg-unit Tab  Generic drug:  vitamins  A,C,E-zinc-copper     rosuvastatin 10 MG tablet  Commonly known as:  CRESTOR     SENNA WITH DOCUSATE SODIUM 8.6-50 mg per tablet  Generic drug:  senna-docusate 8.6-50 mg     venlafaxine  37.5 MG 24 hr capsule  Commonly known as:  EFFEXOR-XR  Take 1 capsule (37.5 mg total) by mouth once daily.        STOP taking these medications    nitrofurantoin (macrocrystal-monohydrate) 100 MG capsule  Commonly known as:  MACROBID           Where to Get Your Medications      These medications were sent to [facility] - [address], [place]  [address], [place], [place] [address]    Phone:  [phone]   · amiodarone 200 MG Tab  · levothyroxine 25 MCG tablet  · metoprolol tartrate 25 MG tablet     You can get these medications from any pharmacy    Bring a paper prescription for each of these medications  · cephALEXin 500 MG capsule  · oxyCODONE-acetaminophen 5-325 mg per tablet         Indwelling Lines/Drains at time of discharge:   Lines/Drains/Airways     Pressure Ulcer                 Pressure Injury 03/26/18 1330 Right medial Buttocks Stage 2 3 days                Time spent on the discharge of patient: 30 minutes  Patient was seen and examined on the date of discharge and determined to be suitable for discharge.         Cindy Ramos MD  Department of Hospital Medicine  [facility]

## 2018-04-02 ENCOUNTER — PATIENT OUTREACH (OUTPATIENT)
Dept: ADMINISTRATIVE | Facility: CLINIC | Age: 83
End: 2018-04-02

## 2018-04-02 PROBLEM — Z00.00 PREVENTATIVE HEALTH CARE: Status: RESOLVED | Noted: 2017-04-19 | Resolved: 2018-04-02

## 2018-04-02 NOTE — PATIENT INSTRUCTIONS
Discharge Instructions for Pacemaker Implantation  You have had a procedure to insert a pacemaker. Once inside your body, this small electronic device helps keep your heart from beating too slowly. A pacemaker cant fix existing heart problems. But it can help you feel better and have more energy. As you recover, follow all of the instructions you are given, including those below.  Activity  Dont drive until your doctor says its okay.  Follow the instructions you are given about limiting your activity.  If you are fitted with an arm sling, keep your arm in the sling for 24 hours.  Do not raise your arm on the incision side above shoulder level for 10 days. This gives the device lead wires time to attach securely inside your heart.  Ask your doctor when you can expect to return to work.  You can still exercise. Its good for your body and your heart. Talk with your doctor about an exercise plan.  Other Precautions  Change your dressing as often as your doctor instructs. Avoid getting the area wet for about a week.  Every day, take your temperature and check your incision for signs of infection (redness, swelling, drainage, or warmth). Do this for 7 days.  Learn to take your own pulse. Keep a record of your results. Ask your doctor what pulse rate means you should call for medical attention.  Before you receive any treatment, tell all healthcare providers (including your dentist) that you have a pacemaker.  Carry an ID card that contains information about your pacemaker. You can show this card if your pacemaker sets off a metal detector. You should also show it to avoid screening with a hand-held security wand.  Keep your cell phone away from your pacemaker. Dont carry the phone in your shirt pocket, even when its turned off.  Avoid strong magnets. Examples are those used in MRIs or in hand-held security wands.  Avoid strong electrical fields. Examples are those made by radio transmitting towers, ham radios, and  heavy-duty electrical equipment.  Avoid leaning over the open kevin of a running car. A running engine creates an electrical field.  Follow-Up  See your cardiologist in the next 7-10 days. Call and make an appointment as soon as you get home.  Make regular follow-up appointments with your doctor. He or she will check the pacemaker to make sure its working properly.    When to Call Your Doctor  Call your doctor immediately if you have any of the following:  Dizziness  Chest pain  Lack of energy  Fainting spells  Twitching chest muscles  Rapid pulse or pounding heartbeat  Shortness of breath  Pain around your pacemaker  Fever above 100.4°F or other signs of infection (redness, swelling, drainage, or warmth at the incision site)  Hiccups that wont stop   © 9869-0417 [facility], [address] [place] [address], [place] [address]. All rights reserved. This information is not intended as a substitute for professional medical care. Always follow your healthcare professional's instructions.

## 2018-04-02 NOTE — Clinical Note
Please forward this important TCC information to your provider in order to maximize the post discharge care delivery of this patient.  C3 nurse spoke with Katherine Hernandez  for a TCC post hospital discharge follow up call. The patient has a scheduled HOSFU appointment with Jacobo Gaspar MD on 4/11 @ 4856. Anca pt's dtr requesting the scheduled appt on 4/11 as above. However, she also has an appt on 4/9 which she cannot make. If that's okay, please cancel the 4/9 appt scheduled in Epic. THANKING YOU IN ADVANCE.  Respectfully, Lizy Jeffrey, RN [facility]   [email or web address]     Please do not reply to this message, as this inbox is not routinely monitored.

## 2018-04-05 ENCOUNTER — OUTPATIENT CASE MANAGEMENT (OUTPATIENT)
Dept: ADMINISTRATIVE | Facility: OTHER | Age: 83
End: 2018-04-05

## 2018-04-06 NOTE — PROGRESS NOTES
The following patient has been assigned to Jessica Oviedo, RN with [facility]patient [facility] for high risk screening.    Reason: High Risk : Recently discharged      Please contact OPCM at [phone] with any questions.    Thank you,    So White, [facility]  [facility]  Ext [phone]

## 2018-04-09 ENCOUNTER — TELEPHONE (OUTPATIENT)
Dept: INTERNAL MEDICINE | Facility: CLINIC | Age: 83
End: 2018-04-09

## 2018-04-10 NOTE — TELEPHONE ENCOUNTER
Please clarify who is requesting swallow study. Is it home health? I will also need Dx, so I can attach to order; I don't believe I have documentation in my note dated 3/20/18.

## 2018-04-11 ENCOUNTER — OFFICE VISIT (OUTPATIENT)
Dept: INTERNAL MEDICINE | Facility: CLINIC | Age: 83
End: 2018-04-11
Payer: MEDICARE

## 2018-04-11 ENCOUNTER — PATIENT MESSAGE (OUTPATIENT)
Dept: INTERNAL MEDICINE | Facility: CLINIC | Age: 83
End: 2018-04-11

## 2018-04-11 VITALS
HEART RATE: 72 BPM | HEIGHT: 62 IN | SYSTOLIC BLOOD PRESSURE: 120 MMHG | BODY MASS INDEX: 28.39 KG/M2 | WEIGHT: 154.31 LBS | DIASTOLIC BLOOD PRESSURE: 60 MMHG

## 2018-04-11 DIAGNOSIS — I50.32 CHRONIC DIASTOLIC HEART FAILURE: Chronic | ICD-10-CM

## 2018-04-11 DIAGNOSIS — R13.10 DYSPHAGIA, UNSPECIFIED TYPE: ICD-10-CM

## 2018-04-11 DIAGNOSIS — D64.9 ANEMIA, UNSPECIFIED TYPE: ICD-10-CM

## 2018-04-11 DIAGNOSIS — Z95.0 PACEMAKER: ICD-10-CM

## 2018-04-11 DIAGNOSIS — I10 ESSENTIAL HYPERTENSION: Primary | Chronic | ICD-10-CM

## 2018-04-11 DIAGNOSIS — E87.1 HYPONATREMIA: ICD-10-CM

## 2018-04-11 DIAGNOSIS — N18.30 CKD (CHRONIC KIDNEY DISEASE) STAGE 3, GFR 30-59 ML/MIN: Chronic | ICD-10-CM

## 2018-04-11 DIAGNOSIS — N39.0 URINARY TRACT INFECTION WITHOUT HEMATURIA, SITE UNSPECIFIED: ICD-10-CM

## 2018-04-11 DIAGNOSIS — E66.3 OVERWEIGHT (BMI 25.0-29.9): ICD-10-CM

## 2018-04-11 DIAGNOSIS — I48.0 PAROXYSMAL ATRIAL FIBRILLATION: Chronic | ICD-10-CM

## 2018-04-11 PROCEDURE — 99999 PR PBB SHADOW E&M-EST. PATIENT-LVL IV: CPT | Mod: PBBFAC,,, | Performed by: INTERNAL MEDICINE

## 2018-04-11 PROCEDURE — 99499 UNLISTED E&M SERVICE: CPT | Mod: S$GLB,,, | Performed by: INTERNAL MEDICINE

## 2018-04-11 PROCEDURE — 99214 OFFICE O/P EST MOD 30 MIN: CPT | Mod: S$GLB,,, | Performed by: INTERNAL MEDICINE

## 2018-04-11 RX ORDER — LEVOFLOXACIN 250 MG/1
250 TABLET ORAL DAILY
Qty: 5 TABLET | Refills: 0 | Status: SHIPPED | OUTPATIENT
Start: 2018-04-11 | End: 2018-04-11 | Stop reason: ALTCHOICE

## 2018-04-11 RX ORDER — AMOXICILLIN 250 MG/1
250 CAPSULE ORAL EVERY 12 HOURS
Qty: 10 CAPSULE | Refills: 0 | Status: SHIPPED | OUTPATIENT
Start: 2018-04-11 | End: 2018-04-16

## 2018-04-11 NOTE — PROGRESS NOTES
Pt. ID: Katherine Hernandez is a 86 y.o. female      Chief complaint:   Chief Complaint   Patient presents with    Follow-up       HPI: Pt. Here for f/u for HTN, UTI and CKD; pt. Daughter, Anca, is with the pt.;  I reviewed labs dated 4/9/18; anemia is stable; kidney fxn is elevated but stable; pt. Is taking lasix 20 mg QD; she has f/u cardiology and had pacemaker placed; she is on eliquis for A-fib; pt. Has mild signs of UTI; she reports dysuria and urine culture dated 2/1/18 showed enterococcus sensitive to PCN; sodium has normalized; home health recommends modified barium swallowing test for difficulty swallowing which has improved but aspiration risk is a concern      Review of Systems   Constitutional: Negative for chills and fever.   Respiratory: Negative for cough and shortness of breath.    Cardiovascular: Negative for chest pain.   Gastrointestinal: Negative for abdominal pain, nausea and vomiting.        Dysphagia resolving post discharge   Genitourinary: Positive for dysuria.         Objective:    Physical Exam   Constitutional: She is oriented to person, place, and time.   overweight   Eyes: EOM are normal.   Neck: Normal range of motion.   Cardiovascular: Normal rate, regular rhythm and normal heart sounds.    Pulmonary/Chest: Effort normal and breath sounds normal.   Abdominal: Soft. There is no tenderness. There is no rebound and no guarding.   Musculoskeletal: Normal range of motion.   Gait instability: wheel chair bound   Neurological: She is alert and oriented to person, place, and time.   Skin: No rash noted.   Vitals reviewed.        Health Maintenance   Topic Date Due    Pneumococcal (65+) (2 of 2 - PCV13) 10/19/2018    Lipid Panel  03/19/2023    TETANUS VACCINE  10/19/2027    Zoster Vaccine  Completed    Influenza Vaccine  Completed         Assessment:     1. Essential hypertension Well controlled   2. Urinary tract infection without hematuria, site unspecified Active   3. Chronic  diastolic heart failure Well controlled   4. CKD (chronic kidney disease) stage 3, GFR 30-59 ml/min Stable   5. Hyponatremia Well controlled   6. Paroxysmal atrial fibrillation Active   7. Anemia, unspecified type Stable   8. Dysphagia, unspecified type Well controlled   9. Pacemaker Active   10. Overweight (BMI 25.0-29.9) Sub-optimally controlled         Plan: Essential hypertension  Comments:  continue current regimen and encouraged low Na diet   Orders:  -     CBC auto differential; Future; Expected date: 05/02/2018  -     Comprehensive metabolic panel; Future; Expected date: 05/02/2018    Urinary tract infection without hematuria, site unspecified  Comments:  start amoxcillin based on previous sensitivities and repeat U/A and culture prior to 1 month f/u   Orders:  -     Discontinue: levoFLOXacin (LEVAQUIN) 250 MG tablet; Take 1 tablet (250 mg total) by mouth once daily.  Dispense: 5 tablet; Refill: 0  -     amoxicillin (AMOXIL) 250 MG capsule; Take 1 capsule (250 mg total) by mouth every 12 (twelve) hours.  Dispense: 10 capsule; Refill: 0  -     Urinalysis; Future; Expected date: 05/02/2018  -     Urine culture; Future; Expected date: 05/02/2018    Chronic diastolic heart failure  Comments:  continue current regimen and f/u cardiology who is managing     CKD (chronic kidney disease) stage 3, GFR 30-59 ml/min  Comments:  monitor and avoid NSAIDs  Orders:  -     Comprehensive metabolic panel; Future; Expected date: 05/02/2018    Hyponatremia  Comments:  normalized; monitor   Orders:  -     Comprehensive metabolic panel; Future; Expected date: 05/02/2018    Paroxysmal atrial fibrillation  Comments:  continue current regimen and f/u cardiology who is managing     Anemia, unspecified type  Comments:  monitor   Orders:  -     CBC auto differential; Future; Expected date: 05/02/2018    Dysphagia, unspecified type  Comments:  resolving post discharge; get modified barium swallow  Orders:  -     Fl Modified Barium  Swallow Speech; Future; Expected date: 04/11/2018  -     SLP video swallow; Future; Expected date: 04/11/2018    Pacemaker    Overweight (BMI 25.0-29.9)  Comments:  encouraged diet         Problem List Items Addressed This Visit        Cardiac/Vascular    Essential hypertension - Primary (Chronic)    Relevant Orders    CBC auto differential    Comprehensive metabolic panel    Chronic diastolic heart failure (Chronic)    Overview     12- echo done in [facility]- 1.The left ventricle is normal in size. Global left ventricular systolic function appears to be borderline normal with difficulty evaluating the left ventricular ejection fraction due to increased heart rate, wall motion abnormality, and atrial fibrillation. Left ventricular diastolic function is inderterminate. Regional wall motion analysis shows hypokinesis of anterior wall, basal anterolateral segment and mid anterolateral segment. Wall motion score index is 1.83. Noted mild left ventricular hypertrophy.  Right ventricular systolic function is normal.  Mild mitral annular calcification is noted. Mild calcification of the mitral valve is noted.  Aortic valve appears to be mildly stenotic based on aortic valve area by continuity equation. Mild calcification is noted. Aortic valve area continuity equation is 1.62 cm². LVOT Diameter is 1.9 cms.  Trace pulmonic regurgitation. Mild (1+) aortic regurgitation. Moderate (2+) mitral regurgitation. Moderate to severe (3+) tricuspid regurgitation.   The pulmonary artery systolic pressure is 61 mmHg. Evidence of suspected pulmonary hypertension is detected.         Paroxysmal atrial fibrillation (Chronic)    Pacemaker       Renal/    Hyponatremia    Relevant Orders    Comprehensive metabolic panel    CKD (chronic kidney disease) stage 3, GFR 30-59 ml/min (Chronic)    Relevant Orders    Comprehensive metabolic panel    Urinary tract infection without hematuria    Relevant Medications    amoxicillin (AMOXIL) 250 MG  capsule    Other Relevant Orders    Urinalysis    Urine culture       Oncology    Anemia    Overview     monitor          Relevant Orders    CBC auto differential       Endocrine    Overweight (BMI 25.0-29.9)       GI    Dysphagia    Relevant Orders    Fl Modified Barium Swallow Speech    SLP video swallow

## 2018-04-12 ENCOUNTER — OUTPATIENT CASE MANAGEMENT (OUTPATIENT)
Dept: ADMINISTRATIVE | Facility: OTHER | Age: 83
End: 2018-04-12

## 2018-04-12 NOTE — PATIENT INSTRUCTIONS
What Is Atrial Flutter/Atrial Fibrillation?      The heart has its own electrical system. This system makes the signals that start each heartbeat. The heartbeat begins in 1 of the 2 upper chambers of the heart (atria). A problem can make the atria beat faster than normal. The atria may beat fast but still evenly. This problem is called atrial flutter. If the atria beat very fast and also unevenly, it is called atrial fibrillation (AFib).  Causes of Atrial Flutter and Atrial Fibrillation  Causes of these problems can include:  · Previous heart attack  · High blood pressure  · Thyroid problems  In many cases, the cause is unknown.  When the Atria Beat Too Fast  The atria may beat fast only once in a while. This is called a paroxysmal heart rhythm problem. If they beat fast all the time, it is a chronic problem.  Atrial Flutter  With atrial flutter, electrical signals travel around and around inside the atria. These circling signals make the atria beat too fast:  · Atrial flutter can cause symptoms similar to AFib. It can also lead to the even faster, uneven rhythms of AFib.  Atrial Fibrillation (AFib)  With AFib, cells in the atria send extra electrical signals. These extra signals make the atria beat very fast. They also beat unevenly:  · The atria beat so fast and unevenly that they may quiver instead of romero. If the atria dont contract, they dont move enough blood into the 2 lower chambers of the heart (ventricles). This can cause you to feel dizzy or weak.  · Blood that doesnt keep moving can pool and form clots in the atria. These clots can move into other parts of the body and cause serious problems such as a stroke.   Symptoms of Atrial Flutter and AFib  These symptoms include the following:  · Palpitations (a fluttering, fast heartbeat)  · Weakness or tiredness  · Shortness of breath  · Chest pain or tightness  · Dizziness or lightheadedness  · Fainting spells   Date Last Reviewed: 2/26/2014  ©  5549-4508 [employer]. [address] [place] [address], [place] [address]. All rights reserved. This information is not intended as a substitute for professional medical care. Always follow your healthcare professional's instructions.        Preventing Falls: Are You At Risk of Falling?     Ask for help to reduce risk of falling in your home.     As you get older, you're not as steady on your feet as you once were. And you may have health problems you didn't have when you were younger. So, it's not surprising that older people are more likely to trip and fall. Falling can be very serious. It can change your overall health and quality of life. That's why it's important to be aware of your own risk of falling.  The dangers of falling  Falls are one of the main causes of injury in people over age 65. An older person who falls may take longer to get better than a younger person. And, after a fall, an older person is more likely to have problems that don't go away. So, preventing falls can help you avoid serious health problems.  Are you at risk of falling?  Answer these questions to rate your level of risk.  · Are you a woman?  · Have you fallen or stumbled in the last year?  · Are you over age 65?  · Are you ever dizzy or lightheaded with standing?  · Do you have a hard time getting in and out of the bathtub or on and off the toilet?  · Do you lean on objects to help you get around? Or do you use a cane or walker?  · Do you have vision or hearing problems? For example, do you need new glasses or hearing aids?  · Do you have 2 or more long-lasting (chronic) medical conditions?  · Do you take 3 or more medicines?  · Have you felt depressed recently?  · Have you had more trouble with your memory in recent months?  · Are there hazards in your home that might cause you to fall, such as loose rugs or poor lighting?  · Do you have a pet that jumps on you or might trip you?  · Have you stopped getting regular  "exercise?  · Do you have diabetes?   · Do you have a neurologic disease, such as Parkinson or Alzheimer disease?   · Do you drink alcohol?  · Do you wear athletic shoes or slippers, or go barefoot at home?  You can help prevent falls  If you answered "yes" to any of the above questions, you should take steps to reduce your risk of a fall. Monitoring health conditions and keeping walkways in your home free of clutter are just 2 ways. Changing is sometimes easier said than done. But keep in mind that even small changes can make you less likely to fall.  The fear of falling  It's normal to be scared of falling, especially if you've fallen before. But being afraid can actually make you more likely to fall. This is because:  · Fear might cause you to become less active. Being less active can lead to a loss of strength and balance.  · Fear can lead to isolation from others, depression, or the use of more medicines or alcohol. And all these things make falling even more likely.  To break the cycle, learn more about ways to avoid falling. As you take control, you may find yourself feeling less afraid.   Date Last Reviewed: 6/12/2015  © 1461-9955 [employer]. [address] [place] [address], [place] [address]. All rights reserved. This information is not intended as a substitute for professional medical care. Always follow your healthcare professional's instructions.        Preventing Falls in the Home  An adult or child can fall for many reasons. If you are an older adult, you may fall because your reaction time slows down. Your muscles and joints may get stiff, weak, or less flexible because of illness, medicines, or a physical condition. These things can also make a child more likely to fall or be injured in a fall.  Other health problems that make falls more likely include:  · Arthritis  · Dizziness or lightheadedness when you get out of bed (orthostatic hypotension)  · History of a " stroke  · Dizziness  · Anemia  · Certain medicines taken for mental illness  · Problems with balance or gait  · History of falls with or without an injury  · Changes in vision (vision impairment)  · Changes in thinking skills and memory (cognitive impairment)  Injuries from a fall can include broken bones, dislocated joints, and cuts. When these injuries are serious enough, they can make it impossible for you or a child who is injured in a fall to live on his or her own.  Prevention tips  To help prevent falls and fall-related injuries, follow the tips below.   Floors  Make floors safer by doing the following:   · Put nonskid pads under area rugs.  · Remove throw rugs.  · Replace worn floor coverings.  · Tack carpets firmly to each step on carpeted stairs. Put nonskid strips on the edges of uncarpeted stairs.  · Keep floors and stairs free of clutter and cords.  · Arrange furniture so there are clear pathways.  · Clean up any spills right away.  · Wear shoes that fit.  Bathrooms    Make bathrooms safer by doing the following:   · Install grab bars in the tub or shower.  · Apply nonskid strips or put a nonskid rubber mat in the tub or shower.  · Sit on a bath chair to bathe.  · Use bathmats with nonskid backing.  Lighting and the environment  Improve lighting in your home by doing the following:   · Keep a flashlight in each room. Or put a lamp next to the bed within easy reach.  · Put nightlights in the bedrooms, hallways, kitchen, and bathrooms.  · Make sure all stairways have good lighting.  · Take your time when going up and down stairs.  · Put handrails on both sides of stairs and in walkways for more support. To prevent injury to your wrist or arm, dont use handrails to pull yourself up.  · Install grab bars to pull yourself up.  · Move or rearrange items that you use often. This will make them easier to find or reach.  · Look at your home to find any safety hazards. Especially look at doorways, walkways, and  the driveway. Remove or repair any safety problems that you find.  Date Last Reviewed: 8/1/2016  © 5702-5062 [employer]. [address] [place] [address], [place] [address]. All rights reserved. This information is not intended as a substitute for professional medical care. Always follow your healthcare professional's instructions.        Urinary Tract Infections in Women    Urinary tract infections (UTIs) are most often caused by bacteria (germs). These bacteria enter the urinary tract. The bacteria may come from outside the body. Or they may travel from the skin outside the rectum or vagina into the urethra. Female anatomy makes it easy for bacteria from the bowel to enter a womans urinary tract, which is the most common source of UTI. This means women develop UTIs more often than men. Pain in or around the urinary tract is a common UTI symptom. But the only way to know for sure if you have a UTI for the healthcare provider to test your urine. The two tests that may be done are the urinalysis and urine culture.  Types of UTIs  · Cystitis: A bladder infection (cystitis) is the most common UTI in women. You may have urgent or frequent urination. You may also have pain, burning when you urinate, and bloody urine.  · Urethritis: This is an inflamed urethra, which is the tube that carries urine from the bladder to outside the body. You may have lower stomach or back pain. You may also have urgent or frequent urination.  · Pyelonephritis: This is a kidney infection. If not treated, it can be serious and damage your kidneys. In severe cases, you may be hospitalized. You may have a fever and lower back pain.  Medicines to treat a UTI  Most UTIs are treated with antibiotics. These kill the bacteria. The length of time you need to take them depends on the type of infection. It may be as short as 3 days. If you have repeated UTIs, a low-dose antibiotic may be needed for several months. Take antibiotics exactly as  directed. Dont stop taking them until all of the medicine is gone. If you stop taking the antibiotic too soon, the infection may not go away, and you may develop a resistance to the antibiotic. This can make it much harder to treat.  Lifestyle changes to treat and prevent UTIs  The lifestyle changes below will help get rid of your UTI. They may also help prevent future UTIs.  · Drink plenty of fluids. This includes water, juice, or other caffeine-free drinks. Fluids help flush bacteria out of your body.  · Empty your bladder. Always empty your bladder when you feel the urge to urinate. And always urinate before going to sleep. Urine that stays in your bladder can lead to infection. Try to urinate before and after sex as well.  · Practice good personal hygiene. Wipe yourself from front to back after using the toilet. This helps keep bacteria from getting into the urethra.  · Use condoms during sex. These help prevent UTIs caused by sexually transmitted bacteria. Also, avoid using spermicides during sex. These can increase the risk of UTIs. Choose other forms of birth control instead. For women who tend to get UTIs after sex, a low-dose of a preventive antibiotic may be used. Be sure to discuss this option with your healthcare provider.  · Follow up with your healthcare provider as directed. He or she may test to make sure the infection has cleared. If needed, more treatment may be started.  Date Last Reviewed: 1/1/2017  © 4601-0360 [employer]. [address] [place] [address]. All rights reserved. This information is not intended as a substitute for professional medical care. Always follow your healthcare professional's instructions.        Understanding Urinary Tract Infections (UTIs)  Most UTIs are caused by bacteria, although they may also be caused by viruses or fungi. Bacteria from the bowel are the most common source of infection. The infection may start because of any of the  following:  · Sexual activity. During sex, bacteria can travel from the penis, vagina, or rectum into the urethra.   · Bacteria on the skin outside the rectum may travel into the urethra. This is more common in women since the rectum and urethra are closer to each other than in men. Wiping from front to back after using the toilet and keeping the area clean can help prevent germs from getting to the urethra.  · Blockage of urine flow through the urinary tract. If urine sits too long, germs may start to grow out of control.      Parts of the urinary tract  The infection can occur in any part of the urinary tract.  · The kidneys collect and store urine.  · The ureters carry urine from the kidneys to the bladder.  · The bladder holds urine until you are ready to let it out.  · The urethra carries urine from the bladder out of the body. It is shorter in women, so bacteria can move through it more easily. The urethra is longer in men, so a UTI is less likely to reach the bladder or kidneys in men.  Date Last Reviewed: 1/1/2017 © 2000-2017 [employer]. [address] [place] [address], [place] [address]. All rights reserved. This information is not intended as a substitute for professional medical care. Always follow your healthcare professional's instructions.

## 2018-04-12 NOTE — LETTER
April 12, 2018    Katherine Hernandez  [address]  [facility] [address]             [facility] [address] [place] [address] Dear Mrs. Katherine Hernandez,    Miss March thank you for talking with me on behalf of your mother, Mrs. Katherine Hernandez, on Thursday. I have enrolled her in [facility]. I will provide you with additional information about her medications and treatments. I have enclosed information about atrial fibrillation, UTI, and falls.      If you have any questions or concerns, please don't hesitate to call.    Sincerely,        Jessica Oviedo, RN

## 2018-04-13 ENCOUNTER — TELEPHONE (OUTPATIENT)
Dept: INTERNAL MEDICINE | Facility: CLINIC | Age: 83
End: 2018-04-13

## 2018-04-13 DIAGNOSIS — S31.809S WOUND OF BUTTOCK, UNSPECIFIED LATERALITY, SEQUELA: Primary | ICD-10-CM

## 2018-04-13 NOTE — TELEPHONE ENCOUNTER
Wound care ordered pending; please clarify with home health nurse where the wounds are so I can enter a diagnosis

## 2018-04-13 NOTE — TELEPHONE ENCOUNTER
----- Message from Anthony Farias sent at 4/13/2018 12:19 PM CDT -----  Contact: fabio with [facility] [phone]  Rep was checking the status of the referral for wound care for the patient. Please call and advise.

## 2018-04-13 NOTE — TELEPHONE ENCOUNTER
Spoke with Ines ([facility]), report pts wound is located at pts (RT) lower leg and to buttocks. Informed Ines that I will send clarification to Dr Gaspar for future referral and I will call her back for plan & care. Ines verbalized understanding.

## 2018-04-17 ENCOUNTER — TELEPHONE (OUTPATIENT)
Dept: INTERNAL MEDICINE | Facility: CLINIC | Age: 83
End: 2018-04-17

## 2018-04-17 NOTE — TELEPHONE ENCOUNTER
----- Message from Dixie Kohler sent at 4/17/2018 10:30 AM CDT -----  Contact: Anca Meyers - [phone]  Patient is requesting a call back regarding, her mothers wound care. please advise. Please advise

## 2018-04-17 NOTE — TELEPHONE ENCOUNTER
Spoke with pts daughter, jesus Hernandez appt for wound care is 04/25 @ 10:30 w/ Dr BIPIN George she verbalized understanding.

## 2018-04-18 DIAGNOSIS — N18.4 CHRONIC RENAL INSUFFICIENCY, STAGE 4 (SEVERE): ICD-10-CM

## 2018-04-19 RX ORDER — FUROSEMIDE 20 MG/1
TABLET ORAL
Qty: 30 TABLET | Refills: 2 | Status: ON HOLD | OUTPATIENT
Start: 2018-04-19 | End: 2020-02-12 | Stop reason: DRUGHIGH

## 2018-04-20 ENCOUNTER — OUTPATIENT CASE MANAGEMENT (OUTPATIENT)
Dept: ADMINISTRATIVE | Facility: OTHER | Age: 83
End: 2018-04-20

## 2018-04-25 ENCOUNTER — HOSPITAL ENCOUNTER (OUTPATIENT)
Dept: WOUND CARE | Facility: HOSPITAL | Age: 83
Discharge: HOME | End: 2018-04-25
Attending: SURGERY
Payer: MEDICARE

## 2018-04-25 VITALS
BODY MASS INDEX: 28.34 KG/M2 | HEIGHT: 62 IN | WEIGHT: 154 LBS | HEART RATE: 60 BPM | SYSTOLIC BLOOD PRESSURE: 132 MMHG | DIASTOLIC BLOOD PRESSURE: 63 MMHG | TEMPERATURE: 98 F

## 2018-04-25 DIAGNOSIS — L89.313 DECUBITUS ULCER OF RIGHT BUTTOCK, STAGE 3: Primary | ICD-10-CM

## 2018-04-25 DIAGNOSIS — S41.111A SKIN TEAR OF RIGHT UPPER ARM WITHOUT COMPLICATION, INITIAL ENCOUNTER: ICD-10-CM

## 2018-04-25 DIAGNOSIS — L89.323 DECUBITUS ULCER OF LEFT BUTTOCK, STAGE 3: ICD-10-CM

## 2018-04-25 DIAGNOSIS — S81.811A SKIN TEAR OF RIGHT LOWER LEG WITHOUT COMPLICATION, INITIAL ENCOUNTER: ICD-10-CM

## 2018-04-25 PROCEDURE — 99214 OFFICE O/P EST MOD 30 MIN: CPT

## 2018-04-27 ENCOUNTER — OUTPATIENT CASE MANAGEMENT (OUTPATIENT)
Dept: ADMINISTRATIVE | Facility: OTHER | Age: 83
End: 2018-04-27

## 2018-04-27 NOTE — PROGRESS NOTES
Summary: Adrienne reports that the pt is asleep at the present time. Reports that she is eating well. Reports that she is working with therapy.   Interventions: Reviewed safety measures  Plan: Review with daughter safety measures

## 2018-05-01 ENCOUNTER — LAB VISIT (OUTPATIENT)
Dept: LAB | Facility: HOSPITAL | Age: 83
End: 2018-05-01
Attending: INTERNAL MEDICINE
Payer: MEDICARE

## 2018-05-01 ENCOUNTER — OFFICE VISIT (OUTPATIENT)
Dept: NEPHROLOGY | Facility: CLINIC | Age: 83
End: 2018-05-01
Payer: MEDICARE

## 2018-05-01 VITALS
SYSTOLIC BLOOD PRESSURE: 110 MMHG | WEIGHT: 157 LBS | HEART RATE: 60 BPM | DIASTOLIC BLOOD PRESSURE: 70 MMHG | BODY MASS INDEX: 28.89 KG/M2 | OXYGEN SATURATION: 97 % | HEIGHT: 62 IN

## 2018-05-01 DIAGNOSIS — D63.8 ANEMIA OF CHRONIC DISEASE: Chronic | ICD-10-CM

## 2018-05-01 DIAGNOSIS — I48.0 PAROXYSMAL ATRIAL FIBRILLATION: Chronic | ICD-10-CM

## 2018-05-01 DIAGNOSIS — N18.30 CKD (CHRONIC KIDNEY DISEASE) STAGE 3, GFR 30-59 ML/MIN: Primary | Chronic | ICD-10-CM

## 2018-05-01 DIAGNOSIS — N18.30 CKD (CHRONIC KIDNEY DISEASE) STAGE 3, GFR 30-59 ML/MIN: Chronic | ICD-10-CM

## 2018-05-01 DIAGNOSIS — I10 ESSENTIAL HYPERTENSION: Chronic | ICD-10-CM

## 2018-05-01 LAB
BILIRUB UR QL STRIP: NEGATIVE
CAOX CRY UR QL COMP ASSIST: ABNORMAL
CLARITY UR REFRACT.AUTO: ABNORMAL
COLOR UR AUTO: YELLOW
CREAT UR-MCNC: 104 MG/DL
GLUCOSE UR QL STRIP: NEGATIVE
HGB UR QL STRIP: NEGATIVE
HYALINE CASTS UR QL AUTO: 141 /LPF
KETONES UR QL STRIP: NEGATIVE
LEUKOCYTE ESTERASE UR QL STRIP: ABNORMAL
MICROSCOPIC COMMENT: ABNORMAL
NITRITE UR QL STRIP: NEGATIVE
NON-SQ EPI CELLS #/AREA URNS AUTO: 1 /HPF
PH UR STRIP: 5 [PH] (ref 5–8)
PROT UR QL STRIP: NEGATIVE
PROT UR-MCNC: 20 MG/DL
PROT/CREAT RATIO, UR: 0.19
RBC #/AREA URNS AUTO: 1 /HPF (ref 0–4)
SP GR UR STRIP: 1.01 (ref 1–1.03)
SQUAMOUS #/AREA URNS AUTO: 8 /HPF
URN SPEC COLLECT METH UR: ABNORMAL
UROBILINOGEN UR STRIP-ACNC: NEGATIVE EU/DL
WBC #/AREA URNS AUTO: 32 /HPF (ref 0–5)

## 2018-05-01 PROCEDURE — 99204 OFFICE O/P NEW MOD 45 MIN: CPT | Mod: S$GLB,,, | Performed by: INTERNAL MEDICINE

## 2018-05-01 PROCEDURE — 99499 UNLISTED E&M SERVICE: CPT | Mod: S$GLB,,, | Performed by: INTERNAL MEDICINE

## 2018-05-01 PROCEDURE — 99999 PR PBB SHADOW E&M-EST. PATIENT-LVL IV: CPT | Mod: PBBFAC,,, | Performed by: INTERNAL MEDICINE

## 2018-05-01 PROCEDURE — 84156 ASSAY OF PROTEIN URINE: CPT

## 2018-05-01 PROCEDURE — 81001 URINALYSIS AUTO W/SCOPE: CPT

## 2018-05-01 RX ORDER — NITROFURANTOIN (MACROCRYSTALS) 100 MG/1
CAPSULE ORAL
COMMUNITY
Start: 2018-03-20 | End: 2018-05-07
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
## 2018-05-01 NOTE — LETTER
May 1, 2018      Jacobo Gaspar MD  [address] [place] [address]           [facility] - Nephrology  [address]  [facility] [address]  Phone: [phone]  Fax: [phone]          Patient: Katherine Hernandez   MR Number: [id number]   YOB: 1931   Date of Visit: 5/1/2018       Dear Dr. Jacobo Gaspar:    Thank you for referring Katherine Hernandez to me for evaluation. Attached you will find relevant portions of my assessment and plan of care.    If you have questions, please do not hesitate to call me. I look forward to following Katherine Hernandez along with you.    Sincerely,    Debbie Rai MD    Enclosure  CC:  No Recipients    If you would like to receive this communication electronically, please contact [email or web address] or [phone] to request more information on [employer] Link access.    For providers and/or their staff who would like to refer a patient to [facility], please contact us through our one-stop-shop provider referral line, [facility], at [phone].    If you feel you have received this communication in error or would no longer like to receive these types of communications, please e-mail [email or web address]

## 2018-05-02 ENCOUNTER — HOSPITAL ENCOUNTER (OUTPATIENT)
Dept: WOUND CARE | Facility: HOSPITAL | Age: 83
Discharge: HOME | End: 2018-05-02
Attending: SURGERY
Payer: MEDICARE

## 2018-05-02 VITALS
TEMPERATURE: 98 F | BODY MASS INDEX: 28.89 KG/M2 | SYSTOLIC BLOOD PRESSURE: 123 MMHG | DIASTOLIC BLOOD PRESSURE: 58 MMHG | HEIGHT: 62 IN | WEIGHT: 157 LBS | HEART RATE: 60 BPM

## 2018-05-02 DIAGNOSIS — I50.32 CHRONIC DIASTOLIC HEART FAILURE: ICD-10-CM

## 2018-05-02 DIAGNOSIS — N18.30 CKD (CHRONIC KIDNEY DISEASE) STAGE 3, GFR 30-59 ML/MIN: ICD-10-CM

## 2018-05-02 DIAGNOSIS — L89.313 DECUBITUS ULCER OF RIGHT BUTTOCK, STAGE 3: Primary | ICD-10-CM

## 2018-05-02 DIAGNOSIS — L89.323 DECUBITUS ULCER OF LEFT BUTTOCK, STAGE 3: ICD-10-CM

## 2018-05-02 DIAGNOSIS — D63.8 ANEMIA OF CHRONIC DISEASE: ICD-10-CM

## 2018-05-02 PROCEDURE — 99214 OFFICE O/P EST MOD 30 MIN: CPT

## 2018-05-02 NOTE — PROGRESS NOTES
Much of the documentation for this visit was completed in the [facility] system. Please see the attached documentation for further details about this patient's care.     Dallas Snow MD

## 2018-05-02 NOTE — PROGRESS NOTES
Much of the documentation for this visit was completed in the [facility] system. Please see the attached documentation for further details about this patient's care.     Caron Carrion LPN

## 2018-05-07 ENCOUNTER — TELEPHONE (OUTPATIENT)
Dept: INTERNAL MEDICINE | Facility: CLINIC | Age: 83
End: 2018-05-07

## 2018-05-07 DIAGNOSIS — N39.0 URINARY TRACT INFECTION WITH HEMATURIA, SITE UNSPECIFIED: Primary | ICD-10-CM

## 2018-05-07 DIAGNOSIS — R31.9 URINARY TRACT INFECTION WITH HEMATURIA, SITE UNSPECIFIED: Primary | ICD-10-CM

## 2018-05-07 RX ORDER — CIPROFLOXACIN 250 MG/1
250 TABLET, FILM COATED ORAL EVERY 12 HOURS
Qty: 14 TABLET | Refills: 0 | Status: ON HOLD | OUTPATIENT
Start: 2018-05-07 | End: 2020-02-12

## 2018-05-08 ENCOUNTER — TELEPHONE (OUTPATIENT)
Dept: INTERNAL MEDICINE | Facility: CLINIC | Age: 83
End: 2018-05-08

## 2018-05-08 NOTE — TELEPHONE ENCOUNTER
Spoke with pts daughter, informed Ms Hernandez has sign of UTI informed waiting on culture final result start Cipro x 7 days. Anca verbalized understanding.

## 2018-05-09 ENCOUNTER — HOSPITAL ENCOUNTER (OUTPATIENT)
Dept: WOUND CARE | Facility: HOSPITAL | Age: 83
Discharge: HOME | End: 2018-05-09
Attending: SURGERY
Payer: MEDICARE

## 2018-05-09 VITALS — HEART RATE: 62 BPM | TEMPERATURE: 98 F | DIASTOLIC BLOOD PRESSURE: 67 MMHG | SYSTOLIC BLOOD PRESSURE: 142 MMHG

## 2018-05-09 DIAGNOSIS — D63.8 ANEMIA OF CHRONIC DISEASE: ICD-10-CM

## 2018-05-09 DIAGNOSIS — L89.313 DECUBITUS ULCER OF RIGHT BUTTOCK, STAGE 3: Primary | ICD-10-CM

## 2018-05-09 DIAGNOSIS — N18.30 CKD (CHRONIC KIDNEY DISEASE) STAGE 3, GFR 30-59 ML/MIN: ICD-10-CM

## 2018-05-09 DIAGNOSIS — L89.323 DECUBITUS ULCER OF LEFT BUTTOCK, STAGE 3: ICD-10-CM

## 2018-05-09 DIAGNOSIS — I50.32 CHRONIC DIASTOLIC HEART FAILURE: ICD-10-CM

## 2018-05-09 PROCEDURE — 99213 OFFICE O/P EST LOW 20 MIN: CPT

## 2018-05-11 ENCOUNTER — OUTPATIENT CASE MANAGEMENT (OUTPATIENT)
Dept: ADMINISTRATIVE | Facility: OTHER | Age: 83
End: 2018-05-11

## 2018-05-14 NOTE — ASSESSMENT & PLAN NOTE
Call for new or worsening neurologic symptoms or side effects to medication.    PPM dressing changed. Ok for d/c today. Needs staples out in 2 weeks either by his regular cardiologist or myself. Rx for keflex and percocet given to patient. Amiodarone and metoprolol resumed. Ok to resume eliquis tonight

## 2018-05-22 ENCOUNTER — OUTPATIENT CASE MANAGEMENT (OUTPATIENT)
Dept: ADMINISTRATIVE | Facility: OTHER | Age: 83
End: 2018-05-22

## 2018-05-22 ENCOUNTER — OFFICE VISIT (OUTPATIENT)
Dept: INTERNAL MEDICINE | Facility: CLINIC | Age: 83
End: 2018-05-22
Payer: MEDICARE

## 2018-05-22 VITALS
SYSTOLIC BLOOD PRESSURE: 116 MMHG | BODY MASS INDEX: 28.39 KG/M2 | HEART RATE: 60 BPM | OXYGEN SATURATION: 97 % | DIASTOLIC BLOOD PRESSURE: 60 MMHG | WEIGHT: 154.31 LBS | HEIGHT: 62 IN

## 2018-05-22 DIAGNOSIS — N39.0 URINARY TRACT INFECTION WITH HEMATURIA, SITE UNSPECIFIED: ICD-10-CM

## 2018-05-22 DIAGNOSIS — R60.9 EDEMA, UNSPECIFIED TYPE: ICD-10-CM

## 2018-05-22 DIAGNOSIS — R31.9 URINARY TRACT INFECTION WITH HEMATURIA, SITE UNSPECIFIED: ICD-10-CM

## 2018-05-22 DIAGNOSIS — E66.3 OVERWEIGHT (BMI 25.0-29.9): ICD-10-CM

## 2018-05-22 DIAGNOSIS — N18.30 CKD (CHRONIC KIDNEY DISEASE) STAGE 3, GFR 30-59 ML/MIN: Chronic | ICD-10-CM

## 2018-05-22 DIAGNOSIS — I10 HYPERTENSION, UNSPECIFIED TYPE: Primary | ICD-10-CM

## 2018-05-22 DIAGNOSIS — I25.10 CORONARY ARTERY DISEASE INVOLVING NATIVE CORONARY ARTERY OF NATIVE HEART WITHOUT ANGINA PECTORIS: ICD-10-CM

## 2018-05-22 DIAGNOSIS — I48.0 PAROXYSMAL ATRIAL FIBRILLATION: Chronic | ICD-10-CM

## 2018-05-22 DIAGNOSIS — D64.9 ANEMIA, UNSPECIFIED TYPE: ICD-10-CM

## 2018-05-22 PROCEDURE — 99214 OFFICE O/P EST MOD 30 MIN: CPT | Mod: S$GLB,,, | Performed by: INTERNAL MEDICINE

## 2018-05-22 PROCEDURE — 99999 PR PBB SHADOW E&M-EST. PATIENT-LVL IV: CPT | Mod: PBBFAC,,, | Performed by: INTERNAL MEDICINE

## 2018-05-22 PROCEDURE — 99499 UNLISTED E&M SERVICE: CPT | Mod: S$GLB,,, | Performed by: INTERNAL MEDICINE
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
## 2018-05-22 NOTE — PROGRESS NOTES
Pt. ID: Katherine Hernandez is a 86 y.o. female      Chief complaint:   Chief Complaint   Patient presents with    Hypertension     follow up       HPI: Pt. Here for f/u for HTN and UTI; pt. Daughter, Anca, is with the pt.; she has completed cipro for UTI; I reviewed labs dated 5/7/18; UTI with trace hematuria was noted; she has completed cipro; anemia is stable; based on age, pt. Does not want colonoscopy; kidney fxn has improved slightly; pt. Is on eliquis for A-fib and is seeing cardiology; weight is elevated but stable; pt. Is taking lasix 20 mg QD and edema is fairly well controlled; I also asked pt. To start pressure stockings; home health is seeing pt.      Review of Systems   Constitutional: Negative for chills and fever.   Respiratory: Negative for cough and shortness of breath.    Cardiovascular: Positive for leg swelling. Negative for chest pain.   Gastrointestinal: Negative for abdominal pain, nausea and vomiting.   Genitourinary: Negative for dysuria.   Musculoskeletal:        Gait instability: wheel chair bound         Objective:    Physical Exam   Constitutional: She is oriented to person, place, and time.   overweight   Eyes: EOM are normal.   Neck: Normal range of motion.   Cardiovascular: Normal rate, regular rhythm and normal heart sounds.    Pulmonary/Chest: Effort normal and breath sounds normal.   Abdominal: Soft. There is no tenderness. There is no rebound and no guarding.   Musculoskeletal: Normal range of motion. She exhibits edema.   Gait instability: wheel chair bound   Neurological: She is alert and oriented to person, place, and time.   Skin: No rash noted.   Vitals reviewed.        Health Maintenance   Topic Date Due    Influenza Vaccine  08/01/2018    Pneumococcal (65+) (2 of 2 - PCV13) 10/19/2018    Lipid Panel  03/19/2023    TETANUS VACCINE  10/19/2027    Zoster Vaccine  Completed         Assessment:     1. Hypertension, unspecified type Well controlled   2. Urinary tract  infection with hematuria, site unspecified Well controlled   3. Anemia, unspecified type Stable   4. CKD (chronic kidney disease) stage 3, GFR 30-59 ml/min Stable   5. Coronary artery disease involving native coronary artery of native heart without angina pectoris Well controlled   6. Edema, unspecified type Active   7. Paroxysmal atrial fibrillation Well controlled   8. Overweight (BMI 25.0-29.9) Sub-optimally controlled         Plan: Hypertension, unspecified type  Comments:  continue current regimen and encouraged low na diet and weight loss  Orders:  -     CBC auto differential; Future; Expected date: 07/22/2018  -     Comprehensive metabolic panel; Future; Expected date: 07/22/2018    Urinary tract infection with hematuria, site unspecified  Comments:  completed antbx; repeat U/A  Orders:  -     Urinalysis; Future; Expected date: 05/22/2018  -     Urinalysis; Future; Expected date: 07/22/2018    Anemia, unspecified type  Comments:  monitor and based on age, pt. does not want colonoscopy   Orders:  -     CBC auto differential; Future; Expected date: 07/22/2018    CKD (chronic kidney disease) stage 3, GFR 30-59 ml/min  Comments:  monitor and avoid NSAIDs  Orders:  -     Comprehensive metabolic panel; Future; Expected date: 07/22/2018    Coronary artery disease involving native coronary artery of native heart without angina pectoris  Comments:  continue current regimen and f/u cardiology who is managing     Edema, unspecified type  Comments:  continue lasix and asked to elevated legs and try pressure stockings    Paroxysmal atrial fibrillation  Comments:  continue current regimen and f/u cardiology who is managing     Overweight (BMI 25.0-29.9)  Comments:  encouraged diet         Problem List Items Addressed This Visit        Cardiac/Vascular    CAD (coronary artery disease)    Paroxysmal atrial fibrillation (Chronic)    Hypertension - Primary (Chronic)    Relevant Orders    CBC auto differential    Comprehensive  metabolic panel       Renal/    CKD (chronic kidney disease) stage 3, GFR 30-59 ml/min (Chronic)    Relevant Orders    Comprehensive metabolic panel    Urinary tract infection with hematuria    Relevant Orders    Urinalysis    Urinalysis       Oncology    Anemia    Overview     monitor          Relevant Orders    CBC auto differential       Endocrine    Overweight (BMI 25.0-29.9)       Other    Edema

## 2018-05-30 ENCOUNTER — HOSPITAL ENCOUNTER (OUTPATIENT)
Dept: WOUND CARE | Facility: HOSPITAL | Age: 83
Discharge: HOME | End: 2018-05-30
Attending: SURGERY
Payer: MEDICARE

## 2018-05-30 VITALS
HEART RATE: 61 BPM | BODY MASS INDEX: 28.34 KG/M2 | DIASTOLIC BLOOD PRESSURE: 65 MMHG | WEIGHT: 154 LBS | HEIGHT: 62 IN | SYSTOLIC BLOOD PRESSURE: 141 MMHG | TEMPERATURE: 98 F

## 2018-05-30 DIAGNOSIS — D63.8 ANEMIA OF CHRONIC DISEASE: ICD-10-CM

## 2018-05-30 DIAGNOSIS — I50.32 CHRONIC DIASTOLIC HEART FAILURE: ICD-10-CM

## 2018-05-30 DIAGNOSIS — L89.313 DECUBITUS ULCER OF RIGHT BUTTOCK, STAGE 3: Primary | ICD-10-CM

## 2018-05-30 DIAGNOSIS — N18.30 CKD (CHRONIC KIDNEY DISEASE) STAGE 3, GFR 30-59 ML/MIN: ICD-10-CM

## 2018-05-30 DIAGNOSIS — L89.323 DECUBITUS ULCER OF LEFT BUTTOCK, STAGE 3: ICD-10-CM

## 2018-05-30 PROCEDURE — 99212 OFFICE O/P EST SF 10 MIN: CPT

## 2018-05-30 NOTE — PATIENT INSTRUCTIONS
Patient and daughter instructed to call [facility] PRN wound exacerbation and/or new skin breakdown. Patient and daughter verbalized understanding of all discharge instructions at end of clinic visit today.

## 2018-06-04 ENCOUNTER — OUTPATIENT CASE MANAGEMENT (OUTPATIENT)
Dept: ADMINISTRATIVE | Facility: OTHER | Age: 83
End: 2018-06-04

## 2018-06-11 ENCOUNTER — TELEPHONE (OUTPATIENT)
Dept: ADMINISTRATIVE | Facility: CLINIC | Age: 83
End: 2018-06-11

## 2018-06-21 ENCOUNTER — OUTPATIENT CASE MANAGEMENT (OUTPATIENT)
Dept: ADMINISTRATIVE | Facility: OTHER | Age: 83
End: 2018-06-21

## 2018-06-27 ENCOUNTER — OUTPATIENT CASE MANAGEMENT (OUTPATIENT)
Dept: ADMINISTRATIVE | Facility: OTHER | Age: 83
End: 2018-06-27

## 2018-06-27 ENCOUNTER — TELEPHONE (OUTPATIENT)
Dept: INTERNAL MEDICINE | Facility: CLINIC | Age: 83
End: 2018-06-27

## 2018-06-27 NOTE — TELEPHONE ENCOUNTER
Please clarify what Home Health Plan of Care orders I need to sign; I did not see any open Home Health orders in [facility] for this pt.

## 2018-06-29 ENCOUNTER — TELEPHONE (OUTPATIENT)
Dept: INTERNAL MEDICINE | Facility: CLINIC | Age: 83
End: 2018-06-29

## 2018-07-19 ENCOUNTER — PATIENT MESSAGE (OUTPATIENT)
Dept: INTERNAL MEDICINE | Facility: CLINIC | Age: 83
End: 2018-07-19

## 2018-07-25 RX ORDER — OXYCODONE AND ACETAMINOPHEN 5; 325 MG/1; MG/1
1 TABLET ORAL EVERY 4 HOURS PRN
Qty: 10 TABLET | Refills: 0 | OUTPATIENT
Start: 2018-07-25

## 2018-07-26 ENCOUNTER — TELEPHONE (OUTPATIENT)
Dept: ADMINISTRATIVE | Facility: CLINIC | Age: 83
End: 2018-07-26

## 2018-08-06 ENCOUNTER — TELEPHONE (OUTPATIENT)
Dept: FAMILY MEDICINE | Facility: CLINIC | Age: 83
End: 2018-08-06

## 2018-08-06 DIAGNOSIS — L89.90 PRESSURE ULCER, UNSPECIFIED LOCATION, UNSPECIFIED ULCER STAGE: Primary | ICD-10-CM

## 2018-08-06 NOTE — TELEPHONE ENCOUNTER
Please clarify what home health order I need to sign. I did not see any home health orders to signs in my in-box.

## 2018-08-20 ENCOUNTER — PES CALL (OUTPATIENT)
Dept: ADMINISTRATIVE | Facility: CLINIC | Age: 83
End: 2018-08-20

## 2018-10-10 ENCOUNTER — PES CALL (OUTPATIENT)
Dept: ADMINISTRATIVE | Facility: CLINIC | Age: 83
End: 2018-10-10

## 2018-11-06 DIAGNOSIS — F41.9 ANXIETY: ICD-10-CM

## 2018-11-06 DIAGNOSIS — F32.A DEPRESSION, UNSPECIFIED DEPRESSION TYPE: ICD-10-CM

## 2018-11-06 RX ORDER — VENLAFAXINE HYDROCHLORIDE 37.5 MG/1
37.5 CAPSULE, EXTENDED RELEASE ORAL DAILY
Qty: 30 CAPSULE | Refills: 2 | Status: SHIPPED | OUTPATIENT
Start: 2018-11-06 | End: 2019-02-12 | Stop reason: SDUPTHER

## 2019-02-12 DIAGNOSIS — F41.9 ANXIETY: ICD-10-CM

## 2019-02-12 DIAGNOSIS — F32.A DEPRESSION, UNSPECIFIED DEPRESSION TYPE: ICD-10-CM

## 2019-02-12 RX ORDER — VENLAFAXINE HYDROCHLORIDE 37.5 MG/1
37.5 CAPSULE, EXTENDED RELEASE ORAL DAILY
Qty: 30 CAPSULE | Refills: 2 | Status: SHIPPED | OUTPATIENT
Start: 2019-02-12 | End: 2020-12-20

## 2019-03-18 RX ORDER — LEVOTHYROXINE SODIUM 25 UG/1
25 TABLET ORAL
Qty: 30 TABLET | Refills: 11 | OUTPATIENT
Start: 2019-03-18 | End: 2020-03-17

## 2019-03-25 RX ORDER — LEVOTHYROXINE SODIUM 25 UG/1
25 TABLET ORAL
Qty: 30 TABLET | Refills: 11 | OUTPATIENT
Start: 2019-03-25 | End: 2020-03-24

## 2019-04-09 RX ORDER — METOPROLOL TARTRATE 25 MG/1
25 TABLET, FILM COATED ORAL 2 TIMES DAILY
Qty: 60 TABLET | Refills: 11 | OUTPATIENT
Start: 2019-04-09 | End: 2020-04-08

## 2019-04-12 ENCOUNTER — TELEPHONE (OUTPATIENT)
Dept: NEPHROLOGY | Facility: CLINIC | Age: 84
End: 2019-04-12

## 2019-04-29 ENCOUNTER — PES CALL (OUTPATIENT)
Dept: ADMINISTRATIVE | Facility: CLINIC | Age: 84
End: 2019-04-29

## 2019-12-05 ENCOUNTER — PES CALL (OUTPATIENT)
Dept: ADMINISTRATIVE | Facility: CLINIC | Age: 84
End: 2019-12-05

## 2020-02-11 ENCOUNTER — HOSPITAL ENCOUNTER (INPATIENT)
Facility: HOSPITAL | Age: 85
LOS: 7 days | Discharge: HOME HEALTH | DRG: 872 | End: 2020-02-18
Attending: EMERGENCY MEDICINE | Admitting: FAMILY MEDICINE
Payer: MEDICARE

## 2020-02-11 DIAGNOSIS — A41.4 SEPSIS DUE TO KLEBSIELLA PNEUMONIAE: ICD-10-CM

## 2020-02-11 DIAGNOSIS — M25.569 ACUTE KNEE PAIN: ICD-10-CM

## 2020-02-11 DIAGNOSIS — A41.9 SEPSIS, DUE TO UNSPECIFIED ORGANISM, UNSPECIFIED WHETHER ACUTE ORGAN DYSFUNCTION PRESENT: Primary | ICD-10-CM

## 2020-02-11 DIAGNOSIS — R50.9 FEVER: ICD-10-CM

## 2020-02-11 DIAGNOSIS — R78.81 GRAM-NEGATIVE BACTEREMIA: ICD-10-CM

## 2020-02-11 DIAGNOSIS — I50.32 CHRONIC DIASTOLIC HEART FAILURE: Chronic | ICD-10-CM

## 2020-02-11 DIAGNOSIS — R78.81 BACTEREMIA: ICD-10-CM

## 2020-02-11 DIAGNOSIS — R10.11 RUQ ABDOMINAL PAIN: ICD-10-CM

## 2020-02-11 LAB
ALBUMIN SERPL BCP-MCNC: 3.5 G/DL (ref 3.5–5.2)
ALP SERPL-CCNC: 75 U/L (ref 55–135)
ALT SERPL W/O P-5'-P-CCNC: 28 U/L (ref 10–44)
ANION GAP SERPL CALC-SCNC: 9 MMOL/L (ref 8–16)
AST SERPL-CCNC: 31 U/L (ref 10–40)
BASOPHILS # BLD AUTO: 0.01 K/UL (ref 0–0.2)
BASOPHILS NFR BLD: 0.2 % (ref 0–1.9)
BILIRUB SERPL-MCNC: 0.6 MG/DL (ref 0.1–1)
BILIRUB UR QL STRIP: NEGATIVE
BNP SERPL-MCNC: 127 PG/ML (ref 0–99)
BUN SERPL-MCNC: 27 MG/DL (ref 8–23)
CALCIUM SERPL-MCNC: 8.5 MG/DL (ref 8.7–10.5)
CHLORIDE SERPL-SCNC: 99 MMOL/L (ref 95–110)
CLARITY UR: CLEAR
CO2 SERPL-SCNC: 30 MMOL/L (ref 23–29)
COLOR UR: YELLOW
CREAT SERPL-MCNC: 1.6 MG/DL (ref 0.5–1.4)
DIFFERENTIAL METHOD: ABNORMAL
EOSINOPHIL # BLD AUTO: 0 K/UL (ref 0–0.5)
EOSINOPHIL NFR BLD: 0.8 % (ref 0–8)
ERYTHROCYTE [DISTWIDTH] IN BLOOD BY AUTOMATED COUNT: 15.6 % (ref 11.5–14.5)
EST. GFR  (AFRICAN AMERICAN): 33 ML/MIN/1.73 M^2
EST. GFR  (NON AFRICAN AMERICAN): 29 ML/MIN/1.73 M^2
GLUCOSE SERPL-MCNC: 111 MG/DL (ref 70–110)
GLUCOSE UR QL STRIP: NEGATIVE
HCT VFR BLD AUTO: 31.1 % (ref 37–48.5)
HGB BLD-MCNC: 9.5 G/DL (ref 12–16)
HGB UR QL STRIP: NEGATIVE
IMM GRANULOCYTES # BLD AUTO: 0.11 K/UL (ref 0–0.04)
IMM GRANULOCYTES NFR BLD AUTO: 2.2 % (ref 0–0.5)
INFLUENZA A, MOLECULAR: NEGATIVE
INFLUENZA B, MOLECULAR: NEGATIVE
KETONES UR QL STRIP: NEGATIVE
LACTATE SERPL-SCNC: 2.1 MMOL/L (ref 0.5–2.2)
LEUKOCYTE ESTERASE UR QL STRIP: NEGATIVE
LIPASE SERPL-CCNC: 44 U/L (ref 4–60)
LYMPHOCYTES # BLD AUTO: 0.3 K/UL (ref 1–4.8)
LYMPHOCYTES NFR BLD: 6.2 % (ref 18–48)
MCH RBC QN AUTO: 31 PG (ref 27–31)
MCHC RBC AUTO-ENTMCNC: 30.5 G/DL (ref 32–36)
MCV RBC AUTO: 102 FL (ref 82–98)
MONOCYTES # BLD AUTO: 0 K/UL (ref 0.3–1)
MONOCYTES NFR BLD: 0.8 % (ref 4–15)
NEUTROPHILS # BLD AUTO: 4.5 K/UL (ref 1.8–7.7)
NEUTROPHILS NFR BLD: 89.8 % (ref 38–73)
NITRITE UR QL STRIP: NEGATIVE
NRBC BLD-RTO: 0 /100 WBC
PH UR STRIP: 8 [PH] (ref 5–8)
PLATELET # BLD AUTO: 261 K/UL (ref 150–350)
PMV BLD AUTO: 9.6 FL (ref 9.2–12.9)
POTASSIUM SERPL-SCNC: 4.2 MMOL/L (ref 3.5–5.1)
PROT SERPL-MCNC: 6.9 G/DL (ref 6–8.4)
PROT UR QL STRIP: ABNORMAL
RBC # BLD AUTO: 3.06 M/UL (ref 4–5.4)
SODIUM SERPL-SCNC: 138 MMOL/L (ref 136–145)
SP GR UR STRIP: 1.02 (ref 1–1.03)
SPECIMEN SOURCE: NORMAL
TROPONIN I SERPL DL<=0.01 NG/ML-MCNC: 0.01 NG/ML (ref 0–0.03)
URN SPEC COLLECT METH UR: ABNORMAL
UROBILINOGEN UR STRIP-ACNC: NEGATIVE EU/DL
WBC # BLD AUTO: 5.02 K/UL (ref 3.9–12.7)

## 2020-02-11 PROCEDURE — 93010 EKG 12-LEAD: ICD-10-PCS | Mod: HCNC,,, | Performed by: INTERNAL MEDICINE

## 2020-02-11 PROCEDURE — 99291 CRITICAL CARE FIRST HOUR: CPT | Mod: 25,HCNC

## 2020-02-11 PROCEDURE — 80053 COMPREHEN METABOLIC PANEL: CPT | Mod: HCNC

## 2020-02-11 PROCEDURE — 96365 THER/PROPH/DIAG IV INF INIT: CPT | Mod: HCNC

## 2020-02-11 PROCEDURE — G0378 HOSPITAL OBSERVATION PER HR: HCPCS | Mod: HCNC

## 2020-02-11 PROCEDURE — 84484 ASSAY OF TROPONIN QUANT: CPT | Mod: HCNC

## 2020-02-11 PROCEDURE — 83880 ASSAY OF NATRIURETIC PEPTIDE: CPT | Mod: HCNC

## 2020-02-11 PROCEDURE — 93010 ELECTROCARDIOGRAM REPORT: CPT | Mod: HCNC,,, | Performed by: INTERNAL MEDICINE

## 2020-02-11 PROCEDURE — 12000002 HC ACUTE/MED SURGE SEMI-PRIVATE ROOM: Mod: HCNC

## 2020-02-11 PROCEDURE — 96361 HYDRATE IV INFUSION ADD-ON: CPT | Mod: HCNC

## 2020-02-11 PROCEDURE — 96360 HYDRATION IV INFUSION INIT: CPT | Mod: 59,HCNC

## 2020-02-11 PROCEDURE — 81003 URINALYSIS AUTO W/O SCOPE: CPT | Mod: HCNC

## 2020-02-11 PROCEDURE — 87077 CULTURE AEROBIC IDENTIFY: CPT | Mod: HCNC

## 2020-02-11 PROCEDURE — 93005 ELECTROCARDIOGRAM TRACING: CPT | Mod: HCNC

## 2020-02-11 PROCEDURE — 87040 BLOOD CULTURE FOR BACTERIA: CPT | Mod: HCNC

## 2020-02-11 PROCEDURE — 96366 THER/PROPH/DIAG IV INF ADDON: CPT | Mod: HCNC

## 2020-02-11 PROCEDURE — 83690 ASSAY OF LIPASE: CPT | Mod: HCNC

## 2020-02-11 PROCEDURE — 83605 ASSAY OF LACTIC ACID: CPT | Mod: HCNC

## 2020-02-11 PROCEDURE — 63600175 PHARM REV CODE 636 W HCPCS: Mod: HCNC | Performed by: EMERGENCY MEDICINE

## 2020-02-11 PROCEDURE — 87502 INFLUENZA DNA AMP PROBE: CPT | Mod: HCNC

## 2020-02-11 PROCEDURE — 96367 TX/PROPH/DG ADDL SEQ IV INF: CPT | Mod: HCNC

## 2020-02-11 PROCEDURE — 85025 COMPLETE CBC W/AUTO DIFF WBC: CPT | Mod: HCNC

## 2020-02-11 PROCEDURE — 87186 SC STD MICRODIL/AGAR DIL: CPT | Mod: HCNC

## 2020-02-11 RX ORDER — TALC
6 POWDER (GRAM) TOPICAL NIGHTLY PRN
Status: DISCONTINUED | OUTPATIENT
Start: 2020-02-12 | End: 2020-02-18 | Stop reason: HOSPADM

## 2020-02-11 RX ORDER — SODIUM CHLORIDE 9 MG/ML
INJECTION, SOLUTION INTRAVENOUS CONTINUOUS
Status: DISCONTINUED | OUTPATIENT
Start: 2020-02-12 | End: 2020-02-11

## 2020-02-11 RX ORDER — ACETAMINOPHEN 325 MG/1
650 TABLET ORAL EVERY 6 HOURS PRN
Status: DISCONTINUED | OUTPATIENT
Start: 2020-02-12 | End: 2020-02-18 | Stop reason: HOSPADM

## 2020-02-11 RX ORDER — SODIUM CHLORIDE 9 MG/ML
INJECTION, SOLUTION INTRAVENOUS CONTINUOUS
Status: DISCONTINUED | OUTPATIENT
Start: 2020-02-12 | End: 2020-02-13

## 2020-02-11 RX ORDER — ONDANSETRON 2 MG/ML
4 INJECTION INTRAMUSCULAR; INTRAVENOUS EVERY 6 HOURS PRN
Status: DISCONTINUED | OUTPATIENT
Start: 2020-02-12 | End: 2020-02-18 | Stop reason: HOSPADM

## 2020-02-11 RX ORDER — IPRATROPIUM BROMIDE AND ALBUTEROL SULFATE 2.5; .5 MG/3ML; MG/3ML
3 SOLUTION RESPIRATORY (INHALATION) EVERY 4 HOURS PRN
Status: DISCONTINUED | OUTPATIENT
Start: 2020-02-12 | End: 2020-02-18 | Stop reason: HOSPADM

## 2020-02-11 RX ORDER — SODIUM CHLORIDE 0.9 % (FLUSH) 0.9 %
10 SYRINGE (ML) INJECTION
Status: DISCONTINUED | OUTPATIENT
Start: 2020-02-12 | End: 2020-02-18 | Stop reason: HOSPADM

## 2020-02-11 RX ORDER — TRAMADOL HYDROCHLORIDE 50 MG/1
50 TABLET ORAL EVERY 8 HOURS PRN
Status: DISCONTINUED | OUTPATIENT
Start: 2020-02-12 | End: 2020-02-18 | Stop reason: HOSPADM

## 2020-02-11 RX ADMIN — VANCOMYCIN HYDROCHLORIDE 2000 MG: 100 INJECTION, POWDER, LYOPHILIZED, FOR SOLUTION INTRAVENOUS at 09:02

## 2020-02-11 RX ADMIN — PIPERACILLIN AND TAZOBACTAM 4.5 G: 4; .5 INJECTION, POWDER, LYOPHILIZED, FOR SOLUTION INTRAVENOUS; PARENTERAL at 08:02

## 2020-02-11 RX ADMIN — SODIUM CHLORIDE 2178 ML: 0.9 INJECTION, SOLUTION INTRAVENOUS at 07:02

## 2020-02-12 PROBLEM — N17.9 ACUTE KIDNEY INJURY SUPERIMPOSED ON CHRONIC KIDNEY DISEASE: Status: ACTIVE | Noted: 2020-02-12

## 2020-02-12 PROBLEM — R50.9 FEBRILE ILLNESS: Status: ACTIVE | Noted: 2020-02-12

## 2020-02-12 PROBLEM — R78.81 GRAM-NEGATIVE BACTEREMIA: Status: ACTIVE | Noted: 2020-02-12

## 2020-02-12 PROBLEM — N18.9 ACUTE KIDNEY INJURY SUPERIMPOSED ON CHRONIC KIDNEY DISEASE: Status: ACTIVE | Noted: 2020-02-12

## 2020-02-12 PROBLEM — A41.9 SEPSIS: Status: ACTIVE | Noted: 2020-02-12

## 2020-02-12 LAB
ALBUMIN SERPL BCP-MCNC: 2.9 G/DL (ref 3.5–5.2)
ALP SERPL-CCNC: 70 U/L (ref 55–135)
ALT SERPL W/O P-5'-P-CCNC: 48 U/L (ref 10–44)
ANION GAP SERPL CALC-SCNC: 8 MMOL/L (ref 8–16)
ANISOCYTOSIS BLD QL SMEAR: SLIGHT
AST SERPL-CCNC: 54 U/L (ref 10–40)
BASOPHILS # BLD AUTO: ABNORMAL K/UL (ref 0–0.2)
BASOPHILS NFR BLD: 1 % (ref 0–1.9)
BILIRUB SERPL-MCNC: 0.5 MG/DL (ref 0.1–1)
BUN SERPL-MCNC: 22 MG/DL (ref 8–23)
CALCIUM SERPL-MCNC: 7.7 MG/DL (ref 8.7–10.5)
CHLORIDE SERPL-SCNC: 104 MMOL/L (ref 95–110)
CO2 SERPL-SCNC: 24 MMOL/L (ref 23–29)
CREAT SERPL-MCNC: 1.3 MG/DL (ref 0.5–1.4)
DIFFERENTIAL METHOD: ABNORMAL
EOSINOPHIL # BLD AUTO: ABNORMAL K/UL (ref 0–0.5)
EOSINOPHIL NFR BLD: 0 % (ref 0–8)
ERYTHROCYTE [DISTWIDTH] IN BLOOD BY AUTOMATED COUNT: 15.9 % (ref 11.5–14.5)
EST. GFR  (AFRICAN AMERICAN): 42 ML/MIN/1.73 M^2
EST. GFR  (NON AFRICAN AMERICAN): 37 ML/MIN/1.73 M^2
GLUCOSE SERPL-MCNC: 115 MG/DL (ref 70–110)
HCT VFR BLD AUTO: 26.2 % (ref 37–48.5)
HGB BLD-MCNC: 7.9 G/DL (ref 12–16)
HYPOCHROMIA BLD QL SMEAR: ABNORMAL
IMM GRANULOCYTES # BLD AUTO: ABNORMAL K/UL (ref 0–0.04)
IMM GRANULOCYTES NFR BLD AUTO: ABNORMAL % (ref 0–0.5)
LACTATE SERPL-SCNC: 1.6 MMOL/L (ref 0.5–2.2)
LYMPHOCYTES # BLD AUTO: ABNORMAL K/UL (ref 1–4.8)
LYMPHOCYTES NFR BLD: 10 % (ref 18–48)
MAGNESIUM SERPL-MCNC: 2.6 MG/DL (ref 1.6–2.6)
MCH RBC QN AUTO: 31 PG (ref 27–31)
MCHC RBC AUTO-ENTMCNC: 30.2 G/DL (ref 32–36)
MCV RBC AUTO: 103 FL (ref 82–98)
MONOCYTES # BLD AUTO: ABNORMAL K/UL (ref 0.3–1)
MONOCYTES NFR BLD: 4 % (ref 4–15)
NEUTROPHILS NFR BLD: 79 % (ref 38–73)
NEUTS BAND NFR BLD MANUAL: 6 %
NRBC BLD-RTO: 0 /100 WBC
OVALOCYTES BLD QL SMEAR: ABNORMAL
PHOSPHATE SERPL-MCNC: 3.2 MG/DL (ref 2.7–4.5)
PLATELET # BLD AUTO: 239 K/UL (ref 150–350)
PLATELET BLD QL SMEAR: ABNORMAL
PMV BLD AUTO: 9.8 FL (ref 9.2–12.9)
POIKILOCYTOSIS BLD QL SMEAR: SLIGHT
POTASSIUM SERPL-SCNC: 4.2 MMOL/L (ref 3.5–5.1)
PROT SERPL-MCNC: 5.7 G/DL (ref 6–8.4)
RBC # BLD AUTO: 2.55 M/UL (ref 4–5.4)
SODIUM SERPL-SCNC: 136 MMOL/L (ref 136–145)
TROPONIN I SERPL DL<=0.01 NG/ML-MCNC: 0.02 NG/ML (ref 0–0.03)
TROPONIN I SERPL DL<=0.01 NG/ML-MCNC: 0.03 NG/ML (ref 0–0.03)
WBC # BLD AUTO: 12.93 K/UL (ref 3.9–12.7)

## 2020-02-12 PROCEDURE — 83735 ASSAY OF MAGNESIUM: CPT | Mod: HCNC

## 2020-02-12 PROCEDURE — 27000221 HC OXYGEN, UP TO 24 HOURS: Mod: HCNC

## 2020-02-12 PROCEDURE — 97165 OT EVAL LOW COMPLEX 30 MIN: CPT | Mod: HCNC

## 2020-02-12 PROCEDURE — 63600175 PHARM REV CODE 636 W HCPCS: Mod: HCNC | Performed by: NURSE PRACTITIONER

## 2020-02-12 PROCEDURE — 94761 N-INVAS EAR/PLS OXIMETRY MLT: CPT | Mod: HCNC

## 2020-02-12 PROCEDURE — G0378 HOSPITAL OBSERVATION PER HR: HCPCS | Mod: HCNC

## 2020-02-12 PROCEDURE — 97161 PT EVAL LOW COMPLEX 20 MIN: CPT | Mod: HCNC | Performed by: PHYSICAL THERAPIST

## 2020-02-12 PROCEDURE — 97535 SELF CARE MNGMENT TRAINING: CPT | Mod: HCNC

## 2020-02-12 PROCEDURE — 85007 BL SMEAR W/DIFF WBC COUNT: CPT | Mod: NCS,HCNC

## 2020-02-12 PROCEDURE — 63600175 PHARM REV CODE 636 W HCPCS: Mod: HCNC | Performed by: FAMILY MEDICINE

## 2020-02-12 PROCEDURE — 11000001 HC ACUTE MED/SURG PRIVATE ROOM: Mod: HCNC

## 2020-02-12 PROCEDURE — 85027 COMPLETE CBC AUTOMATED: CPT | Mod: HCNC

## 2020-02-12 PROCEDURE — 80053 COMPREHEN METABOLIC PANEL: CPT | Mod: HCNC

## 2020-02-12 PROCEDURE — 96375 TX/PRO/DX INJ NEW DRUG ADDON: CPT

## 2020-02-12 PROCEDURE — 36415 COLL VENOUS BLD VENIPUNCTURE: CPT | Mod: HCNC

## 2020-02-12 PROCEDURE — 96376 TX/PRO/DX INJ SAME DRUG ADON: CPT

## 2020-02-12 PROCEDURE — 25000003 PHARM REV CODE 250: Mod: HCNC | Performed by: NURSE PRACTITIONER

## 2020-02-12 PROCEDURE — 84484 ASSAY OF TROPONIN QUANT: CPT | Mod: 91,HCNC

## 2020-02-12 PROCEDURE — 84100 ASSAY OF PHOSPHORUS: CPT | Mod: HCNC

## 2020-02-12 PROCEDURE — 97116 GAIT TRAINING THERAPY: CPT | Mod: HCNC | Performed by: PHYSICAL THERAPIST

## 2020-02-12 RX ORDER — OXYCODONE AND ACETAMINOPHEN 5; 325 MG/1; MG/1
1 TABLET ORAL EVERY 8 HOURS PRN
Status: DISCONTINUED | OUTPATIENT
Start: 2020-02-12 | End: 2020-02-14

## 2020-02-12 RX ORDER — PANTOPRAZOLE SODIUM 40 MG/1
40 TABLET, DELAYED RELEASE ORAL DAILY
Status: DISCONTINUED | OUTPATIENT
Start: 2020-02-12 | End: 2020-02-18 | Stop reason: HOSPADM

## 2020-02-12 RX ORDER — ROSUVASTATIN CALCIUM 10 MG/1
10 TABLET, COATED ORAL NIGHTLY
Status: DISCONTINUED | OUTPATIENT
Start: 2020-02-12 | End: 2020-02-18 | Stop reason: HOSPADM

## 2020-02-12 RX ORDER — AMOXICILLIN 250 MG
1 CAPSULE ORAL NIGHTLY
Status: DISCONTINUED | OUTPATIENT
Start: 2020-02-12 | End: 2020-02-18 | Stop reason: HOSPADM

## 2020-02-12 RX ORDER — GEMFIBROZIL 600 MG/1
300 TABLET, FILM COATED ORAL NIGHTLY
Status: DISCONTINUED | OUTPATIENT
Start: 2020-02-12 | End: 2020-02-12

## 2020-02-12 RX ORDER — LOSARTAN POTASSIUM 25 MG/1
25 TABLET ORAL NIGHTLY
Status: DISCONTINUED | OUTPATIENT
Start: 2020-02-12 | End: 2020-02-12

## 2020-02-12 RX ORDER — VENLAFAXINE HYDROCHLORIDE 37.5 MG/1
37.5 CAPSULE, EXTENDED RELEASE ORAL DAILY
Status: DISCONTINUED | OUTPATIENT
Start: 2020-02-12 | End: 2020-02-18 | Stop reason: HOSPADM

## 2020-02-12 RX ORDER — LEVOTHYROXINE SODIUM 100 UG/1
100 TABLET ORAL
Status: DISCONTINUED | OUTPATIENT
Start: 2020-02-13 | End: 2020-02-18 | Stop reason: HOSPADM

## 2020-02-12 RX ORDER — AMIODARONE HYDROCHLORIDE 200 MG/1
200 TABLET ORAL DAILY
Status: DISCONTINUED | OUTPATIENT
Start: 2020-02-12 | End: 2020-02-18 | Stop reason: HOSPADM

## 2020-02-12 RX ORDER — METOPROLOL TARTRATE 25 MG/1
25 TABLET, FILM COATED ORAL 2 TIMES DAILY
Status: DISCONTINUED | OUTPATIENT
Start: 2020-02-12 | End: 2020-02-18 | Stop reason: HOSPADM

## 2020-02-12 RX ORDER — FUROSEMIDE 40 MG/1
40 TABLET ORAL EVERY OTHER DAY
Status: DISCONTINUED | OUTPATIENT
Start: 2020-02-13 | End: 2020-02-18 | Stop reason: HOSPADM

## 2020-02-12 RX ADMIN — ONDANSETRON 4 MG: 2 INJECTION INTRAMUSCULAR; INTRAVENOUS at 02:02

## 2020-02-12 RX ADMIN — APIXABAN 5 MG: 2.5 TABLET, FILM COATED ORAL at 09:02

## 2020-02-12 RX ADMIN — ROSUVASTATIN CALCIUM 10 MG: 10 TABLET, COATED ORAL at 09:02

## 2020-02-12 RX ADMIN — TRAMADOL HYDROCHLORIDE 50 MG: 50 TABLET, FILM COATED ORAL at 04:02

## 2020-02-12 RX ADMIN — OXYCODONE HYDROCHLORIDE AND ACETAMINOPHEN 1 TABLET: 5; 325 TABLET ORAL at 07:02

## 2020-02-12 RX ADMIN — STANDARDIZED SENNA CONCENTRATE AND DOCUSATE SODIUM 1 TABLET: 8.6; 5 TABLET ORAL at 09:02

## 2020-02-12 RX ADMIN — SODIUM CHLORIDE: 0.9 INJECTION, SOLUTION INTRAVENOUS at 12:02

## 2020-02-12 RX ADMIN — METOPROLOL TARTRATE 25 MG: 25 TABLET ORAL at 12:02

## 2020-02-12 RX ADMIN — THERA TABS 1 TABLET: TAB at 12:02

## 2020-02-12 RX ADMIN — PIPERACILLIN SODIUM AND TAZOBACTAM SODIUM 4.5 G: 4; .5 INJECTION, POWDER, LYOPHILIZED, FOR SOLUTION INTRAVENOUS at 03:02

## 2020-02-12 RX ADMIN — METOPROLOL TARTRATE 25 MG: 25 TABLET ORAL at 09:02

## 2020-02-12 RX ADMIN — VANCOMYCIN HYDROCHLORIDE 500 MG: 500 INJECTION, POWDER, LYOPHILIZED, FOR SOLUTION INTRAVENOUS at 09:02

## 2020-02-12 RX ADMIN — APIXABAN 5 MG: 2.5 TABLET, FILM COATED ORAL at 12:02

## 2020-02-12 RX ADMIN — PANTOPRAZOLE SODIUM 40 MG: 40 TABLET, DELAYED RELEASE ORAL at 12:02

## 2020-02-12 RX ADMIN — VENLAFAXINE HYDROCHLORIDE 37.5 MG: 37.5 CAPSULE, EXTENDED RELEASE ORAL at 12:02

## 2020-02-12 RX ADMIN — AMIODARONE HYDROCHLORIDE 200 MG: 200 TABLET ORAL at 12:02

## 2020-02-12 NOTE — PT/OT/SLP EVAL
Occupational Therapy   Evaluation    Name: Katherine Hernandez  MRN: [id number]  Admitting Diagnosis:  Acute kidney injury superimposed on chronic kidney disease      Recommendations:     Discharge Recommendations: home health PT  Discharge Equipment Recommendations:  none  Barriers to discharge:  None    Assessment:     Katherine Hernandez is a 88 y.o. female with a medical diagnosis of Acute kidney injury superimposed on chronic kidney disease. Performance deficits affecting function: gait instability, pain, impaired balance, impaired self care skills, impaired functional mobilty, impaired endurance, decreased lower extremity function.     Pt performing at baseline for ADLs at this time- requires assist all ADLs excluding feeding. Pt has 24/7 care at home and owns all recommended DME. Re-educated dghtr on home safety. No further OT needs. D/c OT      Rehab Prognosis: Fair; patient would benefit from acute skilled OT services to address these deficits and reach maximum level of function.       Plan:     Patient to be seen (d/c OT ) to address the above listed problems via    · Plan of Care Expires: 02/12/20  · Plan of Care Reviewed with: patient, caregiver, daughter    Subjective     Chief Complaint: chronic pain  Patient/Family Comments/goals: none stated     Occupational Profile:  Living Environment: dghtr in [facility], ramp to enter, B rails, WIS   Previous level of function: pt requires assist all ADLs excluding feeding; dghtr reports that she also uses gait belt for pt and ambulates alongside of her when she is up  Equipment Used at Home:  hospital bed, walker, rolling, wheelchair, bedside commode(gait belt; lift recliner chair )  Assistance upon Discharge: from dghtr and/or cousin; has 24/7 care     Pain/Comfort:  · Pain Rating 1: 0/10    Patients cultural, spiritual, Mandaeism conflicts given the current situation:      Objective:     Communicated with: janel prior to session.   General Precautions: Standard, fall    Orthopedic Precautions:N/A   Braces: N/A     Occupational Performance:    Bed Mobility:    · Patient completed Scooting/Bridging with moderate assistance  · Patient completed Supine to Sit with moderate assistance  · Patient completed Sit to Supine with maximal assistance    Functional Mobility/Transfers:  · Patient completed Sit <> Stand Transfer with minimum assistance and moderate assistance  with  rolling walker   · Patient completed Toilet Transfer Step Transfer technique with minimum assistance with  rolling walker and bedside commode  · Functional Mobility: min A with RW steps alongside EOB     Activities of Daily Living:  · Lower Body Dressing: total assistance    · Toileting: maximal assistance and total assistance on BSC     Cognitive/Visual Perceptual:  Cognitive/Psychosocial Skills:     -       Oriented to: Person, Place and Time   -       Follows Commands/attention:Follows multistep  commands  -       Communication: clear/fluent  -       Memory: No Deficits noted  -       Safety awareness/insight to disability: intact   -       Mood/Affect/Coping skills/emotional control: Appropriate to situation   Extremely [place]     Physical Exam:  Balance:    -       supervision sitting balance; min a standing   Postural examination/scapula alignment:    -       Rounded shoulders  -       Forward head  Skin integrity: bleeding IV site on LUE   Dominant hand:    -       right   Upper Extremity Range of Motion:   BUE ROM WFL for pt's needs based on observed function   Upper Extremity Strength: grossly 4/5 based on observed function     Strength:  Good     AMPAC 6 Click ADL:  AMPAC Total Score: 16    Treatment & Education:  Pt performing skills as above   Pt performing bed mobility as above   Stand pivot to BSC min/mod A   Max/total A toileting on BSC for BM   Educated pt and dghtr on safe handling techniques at home  Educated on use of gait belt for t/fs     Education:    Patient left supine with all lines intact,  call button in reach, bed alarm on and nsg notified/present and dghtr present     GOALS:   Multidisciplinary Problems     Occupational Therapy Goals        Problem: Occupational Therapy Goal    Goal Priority Disciplines Outcome Interventions   Occupational Therapy Goal     OT, PT/OT Adequate for Care Transition                    History:     Past Medical History:   Diagnosis Date    Anticoagulant long-term use     Anxiety     Arthritis     Bronchitis 11/13/2014    CAD (coronary artery disease)     Cancer     Chronic back pain     Chronic diastolic CHF (congestive heart failure)     Encounter for blood transfusion     GERD (gastroesophageal reflux disease)     Hyperlipidemia     Hypertension     Pacemaker     Paroxysmal A-fib     Thyroid disease        Past Surgical History:   Procedure Laterality Date    CARDIAC SURGERY      pacemaker placement    CERVICAL SPINE SURGERY      CHOLECYSTECTOMY      HIP SURGERY         Time Tracking:     OT Date of Treatment: 02/12/20  OT Start Time: 1135  OT Stop Time: 1158  OT Total Time (min): 23 min    Billable Minutes:Evaluation 10  Self Care/Home Management 13    Yoli Mtz, OT  2/12/2020

## 2020-02-12 NOTE — NURSING
Ted Jeansone with [facility] called for pacer interrogation. He will be here in an hour.

## 2020-02-12 NOTE — ASSESSMENT & PLAN NOTE
Afebrile x 24 hours. CXR negative. UA negative. Influenza negative. Lactic acid within normal limits.  No leukocytosis. Pt VSS. Blood culture positive for gram negative rods, susceptibility pending. Repeat cultures collected 2/13

## 2020-02-12 NOTE — PLAN OF CARE
VN rounds: VN cued into pt's room with daughter's permission. Pt resting in bed; appears asleep. Daughter at bedside and updated on POC. Allowed time for questions. Will cont to be available as needed.

## 2020-02-12 NOTE — ASSESSMENT & PLAN NOTE
CKD (chronic kidney disease) stage 3, GFR 30-59 ml/min    Baseline Cr 1.3. Initial Cr 1.6 on admit likely 2/2 volume depletion 2/2 n/v. Received IVF overnight. Renal function now at baseline continue to monitor,  Avoid nephrotoxic meds

## 2020-02-12 NOTE — PLAN OF CARE
Pt admitted to unit from ED. Oriented to room, call light and VN. No complaints of pain or discomfort. NS infusing at 50 ml/hr. Cardiac monitoring initiated. Daughter at the bedside. Will continue to monitor.

## 2020-02-12 NOTE — HPI
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 She lives in [place], [place]. Her primary care physician is Dr. Israel Batista.    She presented to [facility] on 2/11/20 with generalized body aches associated with chest pain, abdominal pain, nausea and vomiting, intermittent dry nonproductive cough. She had a temperature of 100.2° Fahrenheit. Labs showed creatinine 1.6 mg/dL, increased from 1.37 on 6/8/18. She was admitted to [facility].

## 2020-02-12 NOTE — ED NOTES
Patient resting quietly in bed with eyes closed. Resp even and unlabored. Daughter at bedside with no needs voiced. Both ladies given a blanket and informed that we area still waiting on a bed assignment upstairs. No acute issues noted.

## 2020-02-12 NOTE — PLAN OF CARE
Problem: Physical Therapy Goal  Goal: Physical Therapy Goal  Description  1. Ambulate 40' with RW with CG  2.  Supine to/from sit with supervision  3.  Bed to/from chair with CG with RW  4.  Up in chair 2 hours     Outcome: Ongoing, Progressing   Pt would benefit from skilled PT to progress functional mobility

## 2020-02-12 NOTE — NURSING
Jeansone  [employer] pace maker rep here to checked pace maker.  Per Mr. Jeansonsantosh pace maker is functioning perfectly and result placed in the chart.  Michelle calhoun notified of finding.

## 2020-02-12 NOTE — PLAN OF CARE
TN met with patient and daughter Anca [phone] at bedside. Currently patient lives at home with her daughter. DME at home includes a walker, wheelchair and hospital bed. No other DME noted. Patient has used home health in the past but does not have a currently HH agency. Per patient's daughter, patient's cousin sits with the patient while she is at work. Upon discharge, patient's daughter will provide transportation home and be available to help at home as needed along with the patient's cousin. Patient's PCP is Dr. Batista.    TN case manger updated whiteboard with contact information. Blue discharge folder and discharge brochure given to patient's daughter. TN instructed patient and daughter to contact TN if they have any further questions or concerns. TN will continue to follow.         02/12/20 1207   Discharge Assessment   Assessment Type Discharge Planning Assessment   Confirmed/corrected address and phone number on facesheet? Yes   Assessment information obtained from? Patient;Medical Record   Prior to hospitilization cognitive status: Alert/Oriented   Prior to hospitalization functional status: Assistive Equipment   Current cognitive status: Alert/Oriented   Current Functional Status: Assistive Equipment;Needs Assistance   Lives With child(jodi), adult  (Anca, [phone])   Able to Return to Prior Arrangements yes   Is patient able to care for self after discharge? No  (Patient has help at home)   Patient's perception of discharge disposition home or selfcare   Readmission Within the Last 30 Days no previous admission in last 30 days   Patient currently being followed by outpatient case management? No   Patient currently receives any other outside agency services? No   Equipment Currently Used at Home hospital bed;walker, rolling;wheelchair   Do you have any problems affording any of your prescribed medications? No   Is the patient taking medications as prescribed? yes   Does the patient have  transportation home? Yes   Transportation Anticipated family or friend will provide   Does the patient receive services at the [facility]? No   Discharge Plan A Home with family   Discharge Plan B Home with family;Home Health   DME Needed Upon Discharge  none   Patient/Family in Agreement with Plan yes   Readmission Questionnaire   Have you felt down, depressed, or hopeless? 0

## 2020-02-12 NOTE — NURSING
vn cued in to pt's room with  Permission. Daughter at bedside. Pt [place] per daughter. Admission questions completed with aid of daughter. Plan of care reviewed with pt and daughter. Denies any questions or concerns. Call bell w/in reach. Instructed daughter to call for any pt's needs/assist. CHARLES Haywood notified that home medication list has been reviewed with daughter and updated and that pt only had a one time dose order for antibiotics.

## 2020-02-12 NOTE — SUBJECTIVE & OBJECTIVE
Past Medical History:   Diagnosis Date    Anticoagulant long-term use     Anxiety     Arthritis     Bronchitis 11/13/2014    CAD (coronary artery disease)     Cancer     Chronic back pain     Chronic diastolic CHF (congestive heart failure)     Encounter for blood transfusion     GERD (gastroesophageal reflux disease)     Hyperlipidemia     Hypertension     Pacemaker     Paroxysmal A-fib     Thyroid disease        Past Surgical History:   Procedure Laterality Date    CARDIAC SURGERY      pacemaker placement    CERVICAL SPINE SURGERY      CHOLECYSTECTOMY      HIP SURGERY         Review of patient's allergies indicates:   Allergen Reactions    Ace inhibitors     Diovan hct [valsartan-hydrochlorothiazide] Swelling    Fosinopril sodium Swelling    Codeine Rash    Niaspan extended-release [niacin] Rash       No current facility-administered medications on file prior to encounter.      Current Outpatient Medications on File Prior to Encounter   Medication Sig    amiodarone (PACERONE) 200 MG Tab Take 1 tablet (200 mg total) by mouth once daily.    apixaban (ELIQUIS) 5 mg Tab Take 5 mg by mouth 2 (two) times daily.     CA CARB/D3/MAG OX//ANNA/ZN (CALTRATE + D3 PLUS MINERALS ORAL) Take 1 tablet by mouth once daily.     levothyroxine (SYNTHROID) 25 MCG tablet Take 1 tablet (25 mcg total) by mouth before breakfast. (Patient taking differently: Take 100 mcg by mouth before breakfast. )    metoprolol tartrate (LOPRESSOR) 25 MG tablet Take 1 tablet (25 mg total) by mouth 2 (two) times daily.    multivitamin (THERAGRAN) per tablet Take 1 tablet by mouth once daily.    omeprazole (PRILOSEC) 20 MG capsule Take 20 mg by mouth daily as needed.     oxyCODONE-acetaminophen (PERCOCET) 5-325 mg per tablet Take 1 tablet by mouth every 4 (four) hours as needed (pain 5-10/10 pain scale).    polycarbophil (FIBERCON) 625 mg tablet Take 625 mg by mouth once daily.    venlafaxine (EFFEXOR-XR) 37.5 MG 24 hr  capsule TAKE 1 CAPSULE (37.5 MG TOTAL) BY MOUTH ONCE DAILY.    [DISCONTINUED] HYDROcodone-acetaminophen (NORCO) 5-325 mg per tablet Take 1 tablet by mouth every 4 (four) hours as needed for Pain.    albuterol 90 mcg/actuation inhaler Inhale 2 puffs into the lungs every 6 (six) hours as needed for Wheezing. Rescue    furosemide (LASIX) 40 MG tablet Take 40 mg by mouth every other day.     gemfibrozil (LOPID) 600 MG tablet Take 300 mg by mouth. Take 1/2 tablet QHS    losartan (COZAAR) 25 MG tablet Take 1 tablet (25 mg total) by mouth every evening.    rosuvastatin (CRESTOR) 10 MG tablet Take 10 mg by mouth every evening.    senna-docusate 8.6-50 mg (SENNA WITH DOCUSATE SODIUM) 8.6-50 mg per tablet Take 1 tablet by mouth every evening.     [DISCONTINUED] alprazolam (XANAX) 0.5 MG tablet Take 1 tablet (0.5 mg total) by mouth daily as needed for Anxiety (caution on sedative effects).    [DISCONTINUED] ciprofloxacin HCl (CIPRO) 250 MG tablet Take 1 tablet (250 mg total) by mouth every 12 (twelve) hours.    [DISCONTINUED] furosemide (LASIX) 20 MG tablet TAKE ONE TABLET BY MOUTH EVERY DAY    [DISCONTINUED] lidocaine (LIDODERM) 5 % Place 1 patch onto the skin once daily. Remove & Discard patch within 12 hours or as directed by MD     Family History     None        Tobacco Use    Smoking status: Former Smoker     Last attempt to quit: 1984     Years since quittin.5    Smokeless tobacco: Never Used   Substance and Sexual Activity    Alcohol use: No    Drug use: No    Sexual activity: Never     Review of Systems   Constitutional: Negative for chills, diaphoresis and fever.   HENT: Positive for hearing loss. Negative for congestion.    Eyes: Negative for photophobia.   Respiratory: Negative for cough, chest tightness, shortness of breath and wheezing.    Cardiovascular: Negative for chest pain, palpitations and leg swelling.   Gastrointestinal: Positive for abdominal pain, nausea and vomiting. Negative  for diarrhea.   Genitourinary: Negative for dysuria, flank pain, frequency and hematuria.   Musculoskeletal: Positive for myalgias. Negative for back pain.   Neurological: Positive for weakness. Negative for dizziness, syncope, light-headedness and headaches.   Psychiatric/Behavioral: Negative for confusion.     Objective:     Vital Signs (Most Recent):  Temp: 97.3 °F (36.3 °C) (02/12/20 0928)  Pulse: 60 (02/12/20 0928)  Resp: (!) 22 (02/12/20 0928)  BP: (!) 150/70 (02/12/20 0928)  SpO2: (!) 94 % (02/12/20 0928) Vital Signs (24h Range):  Temp:  [97.3 °F (36.3 °C)-100.2 °F (37.9 °C)] 97.3 °F (36.3 °C)  Pulse:  [60-67] 60  Resp:  [16-26] 22  SpO2:  [92 %-94 %] 94 %  BP: (116-168)/(55-77) 150/70     Weight: 72.6 kg (160 lb)  Body mass index is 29.26 kg/m².    Physical Exam   Constitutional: She is oriented to person, place, and time. She appears well-developed and well-nourished.   HENT:   Head: Normocephalic and atraumatic.   Eyes: Pupils are equal, round, and reactive to light. Conjunctivae are normal.   Neck: Normal range of motion. No JVD present.   Cardiovascular: Normal rate, regular rhythm, normal heart sounds and intact distal pulses.   Pulmonary/Chest: Effort normal. No respiratory distress. She has no wheezes.   Abdominal: Soft. Bowel sounds are normal. She exhibits no distension. There is no tenderness. There is no guarding.   Musculoskeletal: Normal range of motion. She exhibits no edema or tenderness.   Neurological: She is alert and oriented to person, place, and time.   Skin: Skin is warm and dry. Capillary refill takes less than 2 seconds.   Psychiatric: She has a normal mood and affect. Her behavior is normal.         CRANIAL NERVES     CN III, IV, VI   Pupils are equal, round, and reactive to light.       Significant Labs:   BMP:   Recent Labs   Lab 02/12/20  0540   *      K 4.2      CO2 24   BUN 22   CREATININE 1.3   CALCIUM 7.7*   MG 2.6     CBC:   Recent Labs   Lab 02/11/20 1945  02/12/20  0540   WBC 5.02 12.93*   HGB 9.5* 7.9*   HCT 31.1* 26.2*    239     Urine Studies:   Recent Labs   Lab 02/11/20 2139   COLORU Yellow   APPEARANCEUA Clear   PHUR 8.0   SPECGRAV 1.020   PROTEINUA Trace*   GLUCUA Negative   KETONESU Negative   BILIRUBINUA Negative   OCCULTUA Negative   NITRITE Negative   UROBILINOGEN Negative   LEUKOCYTESUR Negative       Significant Imaging: I have reviewed all pertinent imaging results/findings within the past 24 hours.

## 2020-02-12 NOTE — H&P
[facility]  History & Physical    Patient Name: Katherine Hernandez  MRN: [id number]  Admission Date: 2/11/2020  Attending Physician: Yoon Calvin*   Primary Care Provider: Israel Batista MD         Patient information was obtained from patient, past medical records and ER records   .     Subjective:     Principal Problem:Acute kidney injury superimposed on chronic kidney disease    Chief Complaint:   Chief Complaint   Patient presents with    Generalized Body Aches     pt c/o midsternal chest pain, back pain, and bilateral shoulder pain since 1600 today. Also c/o chronic right leg pain. Pt took 162mg aspirin at home prior to arrival of EMS, and then vomited once and was incontinent of stool en route. Pt spitting, c/o nausea and body aches on arrival. Low grade temp noted        HPI: Katherine Hernandez is an 87 yo female with HTN, HLD, chronic diastolic heart failure, paroxysmal atrial fibrillation, long tern anticoagulation use, carotid artery disease, chronic kidney disease stage 3, anemia of chronic disease, tachy-tejal syndrome, s/p pacemaker placement, chronic neck pain, chronic back pain and depression who [facility] ED [facility]11/20 with complaint of generalized body aches associated with chest pain, abdominal pain and n/v x 1. Family at bedside also reports intermittent dry nonproductive cough. No hematemesis or melena. Pt with low grade fever (Tmax 100.2) in ED. Initial labs remarkable for H/H 9.5/31, BUN/Cr 27/1.6 (previously 22/1.3). Troponin negative. No acute changes on telemetry. Influenza negative. CXR negative. She received Vancomycin/Zosyn and 2 liters normal saline. Patient admitted to [facility].     Past Medical History:   Diagnosis Date    Anticoagulant long-term use     Anxiety     Arthritis     Bronchitis 11/13/2014    CAD (coronary artery disease)     Cancer     Chronic back pain     Chronic diastolic CHF  (congestive heart failure)     Encounter for blood transfusion     GERD (gastroesophageal reflux disease)     Hyperlipidemia     Hypertension     Pacemaker     Paroxysmal A-fib     Thyroid disease        Past Surgical History:   Procedure Laterality Date    CARDIAC SURGERY      pacemaker placement    CERVICAL SPINE SURGERY      CHOLECYSTECTOMY      HIP SURGERY         Review of patient's allergies indicates:   Allergen Reactions    Ace inhibitors     Diovan hct [valsartan-hydrochlorothiazide] Swelling    Fosinopril sodium Swelling    Codeine Rash    Niaspan extended-release [niacin] Rash       No current facility-administered medications on file prior to encounter.      Current Outpatient Medications on File Prior to Encounter   Medication Sig    amiodarone (PACERONE) 200 MG Tab Take 1 tablet (200 mg total) by mouth once daily.    apixaban (ELIQUIS) 5 mg Tab Take 5 mg by mouth 2 (two) times daily.     CA CARB/D3/MAG OX//ANNA/ZN (CALTRATE + D3 PLUS MINERALS ORAL) Take 1 tablet by mouth once daily.     levothyroxine (SYNTHROID) 25 MCG tablet Take 1 tablet (25 mcg total) by mouth before breakfast. (Patient taking differently: Take 100 mcg by mouth before breakfast. )    metoprolol tartrate (LOPRESSOR) 25 MG tablet Take 1 tablet (25 mg total) by mouth 2 (two) times daily.    multivitamin (THERAGRAN) per tablet Take 1 tablet by mouth once daily.    omeprazole (PRILOSEC) 20 MG capsule Take 20 mg by mouth daily as needed.     oxyCODONE-acetaminophen (PERCOCET) 5-325 mg per tablet Take 1 tablet by mouth every 4 (four) hours as needed (pain 5-10/10 pain scale).    polycarbophil (FIBERCON) 625 mg tablet Take 625 mg by mouth once daily.    venlafaxine (EFFEXOR-XR) 37.5 MG 24 hr capsule TAKE 1 CAPSULE (37.5 MG TOTAL) BY MOUTH ONCE DAILY.    [DISCONTINUED] HYDROcodone-acetaminophen (NORCO) 5-325 mg per tablet Take 1 tablet by mouth every 4 (four) hours as needed for Pain.    albuterol 90  mcg/actuation inhaler Inhale 2 puffs into the lungs every 6 (six) hours as needed for Wheezing. Rescue    furosemide (LASIX) 40 MG tablet Take 40 mg by mouth every other day.     gemfibrozil (LOPID) 600 MG tablet Take 300 mg by mouth. Take 1/2 tablet QHS    losartan (COZAAR) 25 MG tablet Take 1 tablet (25 mg total) by mouth every evening.    rosuvastatin (CRESTOR) 10 MG tablet Take 10 mg by mouth every evening.    senna-docusate 8.6-50 mg (SENNA WITH DOCUSATE SODIUM) 8.6-50 mg per tablet Take 1 tablet by mouth every evening.     [DISCONTINUED] alprazolam (XANAX) 0.5 MG tablet Take 1 tablet (0.5 mg total) by mouth daily as needed for Anxiety (caution on sedative effects).    [DISCONTINUED] ciprofloxacin HCl (CIPRO) 250 MG tablet Take 1 tablet (250 mg total) by mouth every 12 (twelve) hours.    [DISCONTINUED] furosemide (LASIX) 20 MG tablet TAKE ONE TABLET BY MOUTH EVERY DAY    [DISCONTINUED] lidocaine (LIDODERM) 5 % Place 1 patch onto the skin once daily. Remove & Discard patch within 12 hours or as directed by MD     Family History     None        Tobacco Use    Smoking status: Former Smoker     Last attempt to quit: 1984     Years since quittin.5    Smokeless tobacco: Never Used   Substance and Sexual Activity    Alcohol use: No    Drug use: No    Sexual activity: Never     Review of Systems   Constitutional: Negative for chills, diaphoresis and fever.   HENT: Positive for hearing loss. Negative for congestion.    Eyes: Negative for photophobia.   Respiratory: Negative for cough, chest tightness, shortness of breath and wheezing.    Cardiovascular: Negative for chest pain, palpitations and leg swelling.   Gastrointestinal: Positive for abdominal pain, nausea and vomiting. Negative for diarrhea.   Genitourinary: Negative for dysuria, flank pain, frequency and hematuria.   Musculoskeletal: Positive for myalgias. Negative for back pain.   Neurological: Positive for weakness. Negative for  dizziness, syncope, light-headedness and headaches.   Psychiatric/Behavioral: Negative for confusion.     Objective:     Vital Signs (Most Recent):  Temp: 97.3 °F (36.3 °C) (02/12/20 0928)  Pulse: 60 (02/12/20 0928)  Resp: (!) 22 (02/12/20 0928)  BP: (!) 150/70 (02/12/20 0928)  SpO2: (!) 94 % (02/12/20 0928) Vital Signs (24h Range):  Temp:  [97.3 °F (36.3 °C)-100.2 °F (37.9 °C)] 97.3 °F (36.3 °C)  Pulse:  [60-67] 60  Resp:  [16-26] 22  SpO2:  [92 %-94 %] 94 %  BP: (116-168)/(55-77) 150/70     Weight: 72.6 kg (160 lb)  Body mass index is 29.26 kg/m².    Physical Exam   Constitutional: She is oriented to person, place, and time. She appears well-developed and well-nourished.   HENT:   Head: Normocephalic and atraumatic.   Eyes: Pupils are equal, round, and reactive to light. Conjunctivae are normal.   Neck: Normal range of motion. No JVD present.   Cardiovascular: Normal rate, regular rhythm, normal heart sounds and intact distal pulses.   Pulmonary/Chest: Effort normal. No respiratory distress. She has no wheezes.   Abdominal: Soft. Bowel sounds are normal. She exhibits no distension. There is no tenderness. There is no guarding.   Musculoskeletal: Normal range of motion. She exhibits no edema or tenderness.   Neurological: She is alert and oriented to person, place, and time.   Skin: Skin is warm and dry. Capillary refill takes less than 2 seconds.   Psychiatric: She has a normal mood and affect. Her behavior is normal.         CRANIAL NERVES     CN III, IV, VI   Pupils are equal, round, and reactive to light.       Significant Labs:   BMP:   Recent Labs   Lab 02/12/20  0540   *      K 4.2      CO2 24   BUN 22   CREATININE 1.3   CALCIUM 7.7*   MG 2.6     CBC:   Recent Labs   Lab 02/11/20 1945 02/12/20  0540   WBC 5.02 12.93*   HGB 9.5* 7.9*   HCT 31.1* 26.2*    239     Urine Studies:   Recent Labs   Lab 02/11/20  2138   COLORU Yellow   APPEARANCEUA Clear   PHUR 8.0   SPECGRAV 1.020    PROTEINUA Trace*   GLUCUA Negative   KETONESU Negative   BILIRUBINUA Negative   OCCULTUA Negative   NITRITE Negative   UROBILINOGEN Negative   LEUKOCYTESUR Negative       Significant Imaging: I have reviewed all pertinent imaging results/findings within the past 24 hours.    Assessment/Plan:     * Gram-negative bacteremia  89 yo F presented with complaint of generalized body aches, chest pain, abdominal pain, n/v and low grade fever (Tmax 100.2). CXR negative. UA negative. Influenza negative. Lactic acid within normal limits.  No leukocytosis. Pt VSS. She does not appear toxic on exam. Blood culture positive for ESBL.       Acute kidney injury superimposed on chronic kidney disease  CKD (chronic kidney disease) stage 3, GFR 30-59 ml/min    Baseline Cr 1.3. Initial Cr 1.6 on admit likely 2/2 volume depletion 2/2 n/v. Received IVF overnight. Renal function now at baseline continue to monitor,  Avoid nephrotoxic meds       GERD (gastroesophageal reflux disease)  Continue PPI       Anemia of chronic disease  H/H stable, monitor       Depression  Continue effexor       Hypertension  Hyperlipidemia   Paroxysmal atrial fibrillation  Chronic anticoagulation  CAD (coronary artery disease)  Chronic diastolic heart failure  Tachy-tejal syndrome s/p pacemaker placement     -68, patient denies chest pain on exam. Initial troponin negative. No acute findings on tele. No evidence of volume overload on exam     Trend cardiac enzymes   Interrogate pacemaker device   Continue amiodarone, apixaban, furosemide and metoprolol   Continue statin       VTE Risk Mitigation (From admission, onward)         Ordered     apixaban tablet 5 mg  2 times daily      02/12/20 0956     Place JENNIFER hose  Until discontinued      02/11/20 8358                   Michelle Gallegos NP  Department of Hospital Medicine   [facility]

## 2020-02-12 NOTE — ED TRIAGE NOTES
Pt came to the Ed via E[facility] with complaints of generalized body aches including midsternal chest pain that started around 1600 today.  Pt family also notes pt has chronic pain to her right leg. EMS reports pt took aspirin before arrival which upset her stomach.  Pt had one episode of vomitting and also had a BM at the same time.  Pt has productive sputum upon coughing and a low grade temp of 100.2 upon arrival

## 2020-02-12 NOTE — ASSESSMENT & PLAN NOTE
Hyperlipidemia   Paroxysmal atrial fibrillation  Chronic anticoagulation  CAD (coronary artery disease)  Chronic diastolic heart failure  Tachy-tejal syndrome s/p pacemaker placement     -68, patient denies chest pain on exam. Initial troponin negative. No acute findings on tele. No evidence of volume overload on exam     Trend cardiac enzymes   Interrogate pacemaker device   Continue amiodarone, apixaban, furosemide and metoprolol   Continue statin

## 2020-02-12 NOTE — ED NOTES
Patient quiet in bed with eyes closed. Resp even and unlabored. Family at bedside with no concerns voiced at this time,

## 2020-02-12 NOTE — PLAN OF CARE
Problem: Occupational Therapy Goal  Goal: Occupational Therapy Goal  Outcome: Adequate for Care Transition       Pt performing at baseline for ADLs at this time- requires assist all ADLs excluding feeding. Pt has 24/7 care at home and owns all recommended DME. Re-educated dghtr on home safety. No further OT needs. D/c OT

## 2020-02-12 NOTE — ED NOTES
Pt placed on bedpan in attempt to provide urine sample.  Pt had BM in diaper, pt cleaned and dried before placing on bedpan

## 2020-02-12 NOTE — PROGRESS NOTES
Pharmacokinetic Initial Assessment: IV Vancomycin    Assessment/Plan:    Initiate intravenous vancomycin with loading dose of 2000 mg once given on 2/11/20 at 20:30 followed by a maintenance dose of vancomycin 500 mg IV every 24 hours starting 21:30 on 2/12/20  Desired empiric serum trough concentration is 15 to 20 mcg/mL  Draw vancomycin trough level 30 min prior to third dose on 2/13/20 at approximately 2100   Pharmacy will continue to follow and monitor vancomycin.      Please contact pharmacy at extension [phone] with any questions regarding this assessment.     Thank you for the consult,   Deandra Cooney       Patient brief summary:  Katherine Hernandez is a 88 y.o. female initiated on antimicrobial therapy with IV Vancomycin for treatment of suspected bacteremia    Drug Allergies:   Review of patient's allergies indicates:   Allergen Reactions    Ace inhibitors     Diovan hct [valsartan-hydrochlorothiazide] Swelling    Fosinopril sodium Swelling    Codeine Rash    Niaspan extended-release [niacin] Rash       Actual Body Weight:   72.6    Renal Function:   Estimated Creatinine Clearance: 27.9 mL/min (based on SCr of 1.3 mg/dL).,     Dialysis Method (if applicable):  N/A    CBC (last 72 hours):  Recent Labs   Lab Result Units 02/11/20 1945 02/12/20  0540   WBC K/uL 5.02 12.93*   Hemoglobin g/dL 9.5* 7.9*   Hematocrit % 31.1* 26.2*   Platelets K/uL 261 239   Gran% % 89.8* 79.0*   Lymph% % 6.2* 10.0*   Mono% % 0.8* 4.0   Eosinophil% % 0.8 0.0   Basophil% % 0.2 1.0   Differential Method  Automated Manual       Metabolic Panel (last 72 hours):  Recent Labs   Lab Result Units 02/11/20 1945 02/11/20 2139 02/12/20  0540   Sodium mmol/L 138  --  136   Potassium mmol/L 4.2  --  4.2   Chloride mmol/L 99  --  104   CO2 mmol/L 30*  --  24   Glucose mg/dL 111*  --  115*   Glucose, UA   --  Negative  --    BUN, Bld mg/dL 27*  --  22   Creatinine mg/dL 1.6*  --  1.3   Albumin g/dL 3.5  --  2.9*   Total Bilirubin mg/dL 0.6   --  0.5   Alkaline Phosphatase U/L 75  --  70   AST U/L 31  --  54*   ALT U/L 28  --  48*   Magnesium mg/dL  --   --  2.6   Phosphorus mg/dL  --   --  3.2       Drug levels (last 3 results):  No results for input(s): VANCOMYCINRA, VANCOMYCINPE, VANCOMYCINTR in the last 72 hours.    Microbiologic Results:  Microbiology Results (last 7 days)       Procedure Component Value Units Date/Time    Blood culture x two cultures. Draw prior to antibiotics. [[id number]] Collected:  02/11/20 1945    Order Status:  Completed Specimen:  Blood from Peripheral, Antecubital, Left Updated:  02/12/20 1141     Blood Culture, Routine Gram stain aer bottle: Gram negative rods       Results called to and read back by: Katelin Ramachandran RN  02/12/2020        11:41    Narrative:       Aerobic and anaerobic    Blood culture x two cultures. Draw prior to antibiotics. [[id number]] Collected:  02/11/20 1946    Order Status:  Completed Specimen:  Blood from Peripheral, Antecubital, Right Updated:  02/12/20 0515     Blood Culture, Routine No Growth to date    Narrative:       Aerobic and anaerobic    Influenza A & B by Molecular [[id number]] Collected:  02/11/20 1946    Order Status:  Completed Specimen:  Nasopharyngeal Swab Updated:  02/11/20 2022     Influenza A, Molecular Negative     Influenza B, Molecular Negative     Flu A & B Source Nasal swab

## 2020-02-12 NOTE — ED NOTES
Pt lying in bed resting comfortably with family at the bedside.  Pt and family aware of plan of care to await results from blood work and for continued administration of fluids and AX.  Bed locked and in lowest position, side rails up x 2, call light within reach, VSS, will continue to monitor

## 2020-02-12 NOTE — PT/OT/SLP EVAL
Physical Therapy Evaluation    Patient Name:  Katherine Hernandez   MRN:  [id number]    Recommendations:     Discharge Recommendations:  home health PT, home health OT, home with home health   Discharge Equipment Recommendations: (TBD)   Barriers to discharge: decreased functional mobility    Assessment:     Katherine Hernandez is a 88 y.o. female admitted with a medical diagnosis of Acute kidney injury superimposed on chronic kidney disease.  She presents with the following impairments/functional limitations:  weakness, impaired functional mobilty, impaired balance, impaired cardiopulmonary response to activity, impaired endurance, impaired self care skills, gait instability, decreased lower extremity function.  Pt required Min assist for supine to sit.  Pt ambulated 12' with RW with Mod assist of 1 with decreased stance/toe touch type walking RLE.  Pt required CG for sit to supine and Min assist to scoot to HOB in hooklying with bed in trendelenburg.      Rehab Prognosis: Fair; patient would benefit from acute skilled PT services to address these deficits and reach maximum level of function.    Recent Surgery: * No surgery found *      Plan:     During this hospitalization, patient to be seen 5 x/week to address the identified rehab impairments via gait training, therapeutic activities, therapeutic exercises, neuromuscular re-education, wheelchair management/training and progress toward the following goals:    · Plan of Care Expires:  03/12/20    Subjective     Chief Complaint: just feel weak  Patient/Family Comments/goals: walk again  Pain/Comfort:  · Pain Rating 1: 0/10    Patients cultural, spiritual, Jew conflicts given the current situation: no    Living Environment:  Pt lives with her daughter in a [facility] with 3 steps and a railing.  Prior to admission, patients level of function was Mod Ind with RW in house only with daughter walking behind her.  Equipment used at home: walker, rolling, bedside commode,  hospital bed, shower chair, grab bar.  DME owned (not currently used): [employer].  Upon discharge, patient will have assistance from her daughter and cousin.    Objective:     Communicated with nurse prior to session.  Patient found HOB elevated with pulse ox (continuous), telemetry, peripheral IV, bed alarm, oxygen, blood pressure cuff  upon PT entry to room.    General Precautions: Standard, fall   Orthopedic Precautions:    Braces:       Exams:  · Pt is oriented x 3.  Pt lacks 15 degrees right and 5 degrees left knee extension and end ROM flexion right knee also.  Pt has 2+ to 4/5 BLE's.    Functional Mobility:    Pt required Min assist for supine to sit.  Pt ambulated 12' with RW with Mod assist of 1 with decreased stance/toe touch type walking RLE.  Pt required CG for sit to supine and Min assist to scoot to HOB in hooklying with bed in trendelenburg.        Therapeutic Activities and Exercises:   Pt sat at EOB 8  Min.    AM-PAC 6 CLICK MOBILITY  Total Score:15     Patient left HOB elevated with all lines intact, call button in reach, bed alarm on, nurse notified and daughter present.    GOALS:   Multidisciplinary Problems     Physical Therapy Goals        Problem: Physical Therapy Goal    Goal Priority Disciplines Outcome Goal Variances Interventions   Physical Therapy Goal     PT, PT/OT Ongoing, Progressing     Description:  1. Ambulate 40' with RW with CG  2.  Supine to/from sit with supervision  3.  Bed to/from chair with CG with RW  4.  Up in chair 2 hours                      History:     Past Medical History:   Diagnosis Date    Anticoagulant long-term use     Anxiety     Arthritis     Bronchitis 11/13/2014    CAD (coronary artery disease)     Cancer     Chronic back pain     Chronic diastolic CHF (congestive heart failure)     Encounter for blood transfusion     GERD (gastroesophageal reflux disease)     Hyperlipidemia     Hypertension     Pacemaker     Paroxysmal A-fib     Thyroid disease         Past Surgical History:   Procedure Laterality Date    CARDIAC SURGERY      pacemaker placement    CERVICAL SPINE SURGERY      CHOLECYSTECTOMY      HIP SURGERY         Time Tracking:     PT Received On: 02/12/20  PT Start Time: 0900     PT Stop Time: 0926  PT Total Time (min): 26 min     Billable Minutes: Evaluation 13 and Gait Training 13      Lashay Faria, PT  02/12/2020

## 2020-02-12 NOTE — ED PROVIDER NOTES
Encounter Date: 2/11/2020       History     Chief Complaint   Patient presents with    Generalized Body Aches     pt c/o midsternal chest pain, back pain, and bilateral shoulder pain since 1600 today. Also c/o chronic right leg pain. Pt took 162mg aspirin at home prior to arrival of EMS, and then vomited once and was incontinent of stool en route. Pt spitting, c/o nausea and body aches on arrival. Low grade temp noted     The patient is an 88-year-old female who presents to the emergency department with multiple complaints.  She has had nausea and vomiting x1 and she complains of right leg pain, chest pain, low back pain, neck pain, shoulder pain. The patient has a low-grade fever today.  The patient has difficulty hearing.        Review of patient's allergies indicates:   Allergen Reactions    Ace inhibitors     Diovan hct [valsartan-hydrochlorothiazide] Swelling    Fosinopril sodium Swelling    Codeine Rash    Niaspan extended-release [niacin] Rash     Past Medical History:   Diagnosis Date    Anticoagulant long-term use     Anxiety     Arthritis     Bronchitis 11/13/2014    CAD (coronary artery disease)     Cancer     Choledocholithiasis 2/16/2020    Chronic back pain     Chronic diastolic CHF (congestive heart failure)     Encounter for blood transfusion     GERD (gastroesophageal reflux disease)     Hyperlipidemia     Hypertension     Pacemaker     Paroxysmal A-fib     Thyroid disease      Past Surgical History:   Procedure Laterality Date    CARDIAC SURGERY      pacemaker placement    CERVICAL SPINE SURGERY      CHOLECYSTECTOMY      ENDOSCOPIC ULTRASOUND OF UPPER GASTROINTESTINAL TRACT N/A 2/17/2020    Procedure: ULTRASOUND, UPPER GI TRACT, ENDOSCOPIC;  Surgeon: Faustino Kohli MD;  Location: [facility] ENDO;  Service: Endoscopy;  Laterality: N/A;    ERCP N/A 2/17/2020    Procedure: ERCP (ENDOSCOPIC RETROGRADE CHOLANGIOPANCREATOGRAPHY);  Surgeon: Faustino Kohli MD;  Location: [facility]  ENDO;  Service: Endoscopy;  Laterality: N/A;    HIP SURGERY       History reviewed. No pertinent family history.  Social History     Tobacco Use    Smoking status: Former Smoker     Last attempt to quit: 1984     Years since quittin.5    Smokeless tobacco: Never Used   Substance Use Topics    Alcohol use: No    Drug use: No     Review of Systems   Constitutional: Positive for activity change, chills and fever.   HENT: Negative for sinus pain and sore throat.    Respiratory: Negative for cough, chest tightness, shortness of breath and wheezing.    Cardiovascular: Negative for chest pain, palpitations and leg swelling.   Gastrointestinal: Positive for nausea and vomiting. Negative for abdominal pain and diarrhea.   Genitourinary: Negative for dysuria, flank pain and urgency.   Musculoskeletal: Positive for back pain and myalgias. Negative for joint swelling, neck pain and neck stiffness.   Skin: Negative for rash.   Neurological: Positive for weakness and headaches. Negative for dizziness, tremors, syncope, speech difficulty, light-headedness and numbness.   Hematological: Does not bruise/bleed easily.   Psychiatric/Behavioral: Negative for behavioral problems, confusion and suicidal ideas.       Physical Exam     Initial Vitals [20]   BP Pulse Resp Temp SpO2   (!) 168/77 60 (!) 26 100.2 °F (37.9 °C) (!) 94 %      MAP       --         Physical Exam    Nursing note and vitals reviewed.  Constitutional: She appears well-developed and well-nourished.   HENT:   Head: Normocephalic and atraumatic.   Mouth/Throat: Oropharynx is clear and moist.   Eyes: Conjunctivae and EOM are normal. Pupils are equal, round, and reactive to light.   Neck: Normal range of motion. Neck supple.   Cardiovascular: Normal rate, regular rhythm, normal heart sounds and intact distal pulses. Exam reveals no gallop and no friction rub.    No murmur heard.  Pulmonary/Chest: Breath sounds normal.   Abdominal: Soft. Bowel  sounds are normal. She exhibits no distension. There is no tenderness. There is no rebound and no guarding.   Musculoskeletal: Normal range of motion. She exhibits no edema or tenderness.   Lymphadenopathy:     She has no cervical adenopathy.   Neurological: She is alert and oriented to person, place, and time. She has normal strength and normal reflexes.   Skin: Skin is warm and dry.   Psychiatric: She has a normal mood and affect. Her behavior is normal. Judgment and thought content normal.         ED Course   Critical Care  Date/Time: 2/11/2020 10:22 PM  Performed by: Alejandra Montgomery MD  Authorized by: Alejandra Montgomery MD   Total critical care time (exclusive of procedural time) : 34 minutes  Critical care time was exclusive of separately billable procedures and treating other patients.  Critical care was necessary to treat or prevent imminent or life-threatening deterioration of the following conditions: sepsis.  Critical care was time spent personally by me on the following activities: development of treatment plan with patient or surrogate, examination of patient, ordering and performing treatments and interventions, ordering and review of radiographic studies, re-evaluation of patient's condition, evaluation of patient's response to treatment, discussions with consultants, obtaining history from patient or surrogate, ordering and review of laboratory studies, pulse oximetry and review of old charts.        Labs Reviewed   CBC W/ AUTO DIFFERENTIAL - Abnormal; Notable for the following components:       Result Value    RBC 3.06 (*)     Hemoglobin 9.5 (*)     Hematocrit 31.1 (*)     Mean Corpuscular Volume 102 (*)     Mean Corpuscular Hemoglobin Conc 30.5 (*)     RDW 15.6 (*)     Immature Granulocytes 2.2 (*)     Immature Grans (Abs) 0.11 (*)     Lymph # 0.3 (*)     Mono # 0.0 (*)     Gran% 89.8 (*)     Lymph% 6.2 (*)     Mono% 0.8 (*)     All other components within normal limits   COMPREHENSIVE METABOLIC PANEL -  Abnormal; Notable for the following components:    CO2 30 (*)     Glucose 111 (*)     BUN, Bld 27 (*)     Creatinine 1.6 (*)     Calcium 8.5 (*)     eGFR if  33 (*)     eGFR if non  29 (*)     All other components within normal limits   URINALYSIS, REFLEX TO URINE CULTURE - Abnormal; Notable for the following components:    Protein, UA Trace (*)     All other components within normal limits    Narrative:     Preferred Collection Type->Urine, Clean Catch   B-TYPE NATRIURETIC PEPTIDE - Abnormal; Notable for the following components:     (*)     All other components within normal limits   INFLUENZA A & B BY MOLECULAR   LACTIC ACID, PLASMA   TROPONIN I   LIPASE   LACTIC ACID, PLASMA     EKG Readings: (Independently Interpreted)   Initial: 1924. Heart Rate: 60. Clinical Impression: Paced Rhythm     ECG Results          EKG 12-lead (Final result)  Result time 02/12/20 08:22:59    Final result by Interface, Lab In [facility] (02/12/20 08:22:59)                 Narrative:    Test Reason : R50.9,    Vent. Rate : 060 BPM     Atrial Rate : 056 BPM     P-R Int : 000 ms          QRS Dur : 080 ms      QT Int : 432 ms       P-R-T Axes : 000 071 061 degrees     QTc Int : 432 ms    Junctional rhythm  Low voltage QRS  Abnormal ECG  When compared with ECG of 28-MAR-2018 12:20,  Junctional rhythm has replaced Sinus rhythm  QT has shortened  Confirmed by New ANGELES MD, Adithya FRENCH ([id number]) on 2/12/2020 8:22:28 AM    Referred By: [email or web address]   SELF           Confirmed By:Adithya Wolff III, MD                            Imaging Results          X-Ray Knee 3 View Right (Final result)  Result time 02/12/20 00:14:51    Final result by Sierra Hester MD (02/12/20 00:14:51)                 Impression:      See above.      Electronically signed by: Sierra Hester MD  Date:    02/12/2020  Time:    00:14             Narrative:    EXAMINATION:  XR KNEE 3 VIEW RIGHT    CLINICAL HISTORY:  Pain in unspecified  knee    TECHNIQUE:  AP, lateral, and Merchant views of the right knee were performed.    COMPARISON:  February 2014.    FINDINGS:  There is subtle cortical irregularity with nonspecific linear lucency seen involving the medial femoral condyle.  Findings are unable to be confirmed on lateral and Merchant views and may be projectional, however difficult to completely exclude nondisplaced fracture if clinical history of recent trauma.  CT follow-up may be obtained if clinically indicated.    Otherwise no acute displaced fracture or dislocation seen.  Degenerative changes and joint space narrowing are seen involving the medial and lateral tibiofemoral compartments.  Patellofemoral compartment joint space appears fairly well maintained.  No significant suprapatellar joint effusion.  Vascular calcifications are noted.                               X-Ray Chest AP Portable (Final result)  Result time 02/11/20 20:38:42    Final result by Sierra Hester MD (02/11/20 20:38:42)                 Impression:      No acute cardiopulmonary process identified.      Electronically signed by: Sierra Hester MD  Date:    02/11/2020  Time:    20:38             Narrative:    EXAMINATION:  XR CHEST AP PORTABLE    CLINICAL HISTORY:  Sepsis;    TECHNIQUE:  Single frontal view of the chest was performed.    COMPARISON:  June 2018.    FINDINGS:  Cardiac silhouette is stable in size.  Left-sided pacer device is seen.  Lungs are symmetrically expanded.  Mild coarse interstitial lung changes are seen.  No evidence of focal consolidative process, pneumothorax, or significant effusion.  No acute osseous abnormality identified.                                                   ED Course as of Feb 18 1802   Tue Feb 11, 2020   2222 Case discussed with [facility].    [ST]   2321 Family present. They state the patient's PCP has changed to Dr. Batista in [place]. The patient will now go to unreferred. [facility] hospitalist consulted.    [ST]       ED Course User Index  [ST] Alejandra Montgomery MD                Clinical Impression:       ICD-10-CM ICD-9-CM   1. Sepsis, due to unspecified organism, unspecified whether acute organ dysfunction present A41.9 038.9     995.91   2. Fever R50.9 780.60   3. Acute knee pain M25.569 719.46   4. Gram-negative bacteremia R78.81 790.7     041.85   5. Sepsis due to Klebsiella pneumoniae A41.4 038.49     995.91   6. Bacteremia R78.81 790.7   7. RUQ abdominal pain R10.11 789.01   8. Chronic diastolic heart failure I50.32 428.32                             Alejandra Montgomery MD  02/11/20 2222       Alejandra Montgomery MD  02/11/20 2223       Alejandra Montgomery MD  02/18/20 1801

## 2020-02-13 LAB
BASOPHILS # BLD AUTO: 0.02 K/UL (ref 0–0.2)
BASOPHILS NFR BLD: 0.2 % (ref 0–1.9)
DIFFERENTIAL METHOD: ABNORMAL
EOSINOPHIL # BLD AUTO: 0.1 K/UL (ref 0–0.5)
EOSINOPHIL NFR BLD: 0.8 % (ref 0–8)
ERYTHROCYTE [DISTWIDTH] IN BLOOD BY AUTOMATED COUNT: 15.8 % (ref 11.5–14.5)
HCT VFR BLD AUTO: 31 % (ref 37–48.5)
HGB BLD-MCNC: 9.1 G/DL (ref 12–16)
IMM GRANULOCYTES # BLD AUTO: 0.2 K/UL (ref 0–0.04)
IMM GRANULOCYTES NFR BLD AUTO: 2.1 % (ref 0–0.5)
LYMPHOCYTES # BLD AUTO: 1.1 K/UL (ref 1–4.8)
LYMPHOCYTES NFR BLD: 11.4 % (ref 18–48)
MAGNESIUM SERPL-MCNC: 2.4 MG/DL (ref 1.6–2.6)
MCH RBC QN AUTO: 31.2 PG (ref 27–31)
MCHC RBC AUTO-ENTMCNC: 29.4 G/DL (ref 32–36)
MCV RBC AUTO: 106 FL (ref 82–98)
MONOCYTES # BLD AUTO: 0.7 K/UL (ref 0.3–1)
MONOCYTES NFR BLD: 7.6 % (ref 4–15)
NEUTROPHILS # BLD AUTO: 7.6 K/UL (ref 1.8–7.7)
NEUTROPHILS NFR BLD: 77.9 % (ref 38–73)
NRBC BLD-RTO: 0 /100 WBC
PHOSPHATE SERPL-MCNC: 3.3 MG/DL (ref 2.7–4.5)
PLATELET # BLD AUTO: 247 K/UL (ref 150–350)
PMV BLD AUTO: 9.7 FL (ref 9.2–12.9)
RBC # BLD AUTO: 2.92 M/UL (ref 4–5.4)
VANCOMYCIN TROUGH SERPL-MCNC: 14.4 UG/ML (ref 10–22)
WBC # BLD AUTO: 9.69 K/UL (ref 3.9–12.7)

## 2020-02-13 PROCEDURE — 84100 ASSAY OF PHOSPHORUS: CPT | Mod: HCNC

## 2020-02-13 PROCEDURE — 63600175 PHARM REV CODE 636 W HCPCS: Mod: HCNC | Performed by: NURSE PRACTITIONER

## 2020-02-13 PROCEDURE — 25000003 PHARM REV CODE 250: Mod: HCNC | Performed by: NURSE PRACTITIONER

## 2020-02-13 PROCEDURE — 80202 ASSAY OF VANCOMYCIN: CPT | Mod: HCNC

## 2020-02-13 PROCEDURE — 63600175 PHARM REV CODE 636 W HCPCS: Mod: HCNC | Performed by: FAMILY MEDICINE

## 2020-02-13 PROCEDURE — 97530 THERAPEUTIC ACTIVITIES: CPT | Mod: HCNC,CQ

## 2020-02-13 PROCEDURE — 97116 GAIT TRAINING THERAPY: CPT | Mod: HCNC,CQ

## 2020-02-13 PROCEDURE — 94761 N-INVAS EAR/PLS OXIMETRY MLT: CPT | Mod: HCNC

## 2020-02-13 PROCEDURE — 87040 BLOOD CULTURE FOR BACTERIA: CPT | Mod: HCNC

## 2020-02-13 PROCEDURE — 25500020 PHARM REV CODE 255: Mod: HCNC | Performed by: FAMILY MEDICINE

## 2020-02-13 PROCEDURE — 85025 COMPLETE CBC W/AUTO DIFF WBC: CPT | Mod: HCNC

## 2020-02-13 PROCEDURE — 83735 ASSAY OF MAGNESIUM: CPT | Mod: HCNC

## 2020-02-13 PROCEDURE — 11000001 HC ACUTE MED/SURG PRIVATE ROOM: Mod: HCNC

## 2020-02-13 PROCEDURE — 36415 COLL VENOUS BLD VENIPUNCTURE: CPT | Mod: HCNC

## 2020-02-13 RX ORDER — HYDRALAZINE HYDROCHLORIDE 20 MG/ML
10 INJECTION INTRAMUSCULAR; INTRAVENOUS EVERY 8 HOURS PRN
Status: DISCONTINUED | OUTPATIENT
Start: 2020-02-13 | End: 2020-02-13

## 2020-02-13 RX ORDER — KETOROLAC TROMETHAMINE 30 MG/ML
15 INJECTION, SOLUTION INTRAMUSCULAR; INTRAVENOUS ONCE
Status: COMPLETED | OUTPATIENT
Start: 2020-02-13 | End: 2020-02-13

## 2020-02-13 RX ORDER — HYDRALAZINE HYDROCHLORIDE 20 MG/ML
10 INJECTION INTRAMUSCULAR; INTRAVENOUS EVERY 8 HOURS PRN
Status: DISCONTINUED | OUTPATIENT
Start: 2020-02-13 | End: 2020-02-18 | Stop reason: HOSPADM

## 2020-02-13 RX ADMIN — APIXABAN 5 MG: 2.5 TABLET, FILM COATED ORAL at 08:02

## 2020-02-13 RX ADMIN — ACETAMINOPHEN 650 MG: 325 TABLET ORAL at 08:02

## 2020-02-13 RX ADMIN — AMIODARONE HYDROCHLORIDE 200 MG: 200 TABLET ORAL at 08:02

## 2020-02-13 RX ADMIN — VANCOMYCIN HYDROCHLORIDE 500 MG: 500 INJECTION, POWDER, LYOPHILIZED, FOR SOLUTION INTRAVENOUS at 10:02

## 2020-02-13 RX ADMIN — PIPERACILLIN SODIUM AND TAZOBACTAM SODIUM 4.5 G: 4; .5 INJECTION, POWDER, LYOPHILIZED, FOR SOLUTION INTRAVENOUS at 08:02

## 2020-02-13 RX ADMIN — THERA TABS 1 TABLET: TAB at 08:02

## 2020-02-13 RX ADMIN — VENLAFAXINE HYDROCHLORIDE 37.5 MG: 37.5 CAPSULE, EXTENDED RELEASE ORAL at 11:02

## 2020-02-13 RX ADMIN — PIPERACILLIN SODIUM AND TAZOBACTAM SODIUM 4.5 G: 4; .5 INJECTION, POWDER, LYOPHILIZED, FOR SOLUTION INTRAVENOUS at 12:02

## 2020-02-13 RX ADMIN — PANTOPRAZOLE SODIUM 40 MG: 40 TABLET, DELAYED RELEASE ORAL at 08:02

## 2020-02-13 RX ADMIN — PIPERACILLIN SODIUM AND TAZOBACTAM SODIUM 4.5 G: 4; .5 INJECTION, POWDER, LYOPHILIZED, FOR SOLUTION INTRAVENOUS at 05:02

## 2020-02-13 RX ADMIN — FUROSEMIDE 40 MG: 40 TABLET ORAL at 08:02

## 2020-02-13 RX ADMIN — LEVOTHYROXINE SODIUM 100 MCG: 100 TABLET ORAL at 05:02

## 2020-02-13 RX ADMIN — IOHEXOL 75 ML: 350 INJECTION, SOLUTION INTRAVENOUS at 06:02

## 2020-02-13 RX ADMIN — KETOROLAC TROMETHAMINE 15 MG: 30 INJECTION, SOLUTION INTRAMUSCULAR at 10:02

## 2020-02-13 RX ADMIN — ROSUVASTATIN CALCIUM 10 MG: 10 TABLET, COATED ORAL at 08:02

## 2020-02-13 RX ADMIN — METOPROLOL TARTRATE 25 MG: 25 TABLET ORAL at 08:02

## 2020-02-13 RX ADMIN — OXYCODONE HYDROCHLORIDE AND ACETAMINOPHEN 1 TABLET: 5; 325 TABLET ORAL at 08:02

## 2020-02-13 RX ADMIN — STANDARDIZED SENNA CONCENTRATE AND DOCUSATE SODIUM 1 TABLET: 8.6; 5 TABLET ORAL at 08:02

## 2020-02-13 NOTE — PLAN OF CARE
Problem: Physical Therapy Goal  Goal: Physical Therapy Goal  Description  1. Ambulate 40' with RW with CG  2.  Supine to/from sit with supervision  3.  Bed to/from chair with CG with RW  4.  Up in chair 2 hours     Outcome: Ongoing, Progressing   Continue working toward goals.

## 2020-02-13 NOTE — PLAN OF CARE
VN rounds: VN cued into pt's room with pt's family  permission. Pt resting in bed. Fall risk protocol discussed with family. VN instructed  to call for assistance.  aware and agreeable. NAD noted.pt is hard of hearing. Family at bedside stated pt has c/o abd pain. Floor nurse at bedside and reassessed pain level.  Allowed time for questions.  Will cont to be available and intervene as needed.

## 2020-02-13 NOTE — PT/OT/SLP PROGRESS
"Physical Therapy Treatment    Patient Name:  Katherine Hernandez   MRN:  [id number]    Recommendations:     Discharge Recommendations:  home health PT, home health OT, home with home health   Discharge Equipment Recommendations: (TBD)   Barriers to discharge: decreased strength, endurance, and functional mobility    Assessment:     Katherine Hernandez is a 88 y.o. female admitted with a medical diagnosis of Gram-negative bacteremia.  She presents with the following impairments/functional limitations:  weakness, impaired endurance, impaired self care skills, impaired functional mobilty, gait instability, impaired balance, decreased coordination, decreased upper extremity function, decreased lower extremity function, decreased safety awareness, pain, decreased ROM.  Pt would continue to benefit from P.T. To address impairments listed above.      Rehab Prognosis: Fair; patient would benefit from acute skilled PT services to address these deficits and reach maximum level of function.    Recent Surgery: * No surgery found *      Plan:     During this hospitalization, patient to be seen 5 x/week to address the identified rehab impairments via gait training, therapeutic activities, therapeutic exercises, neuromuscular re-education, wheelchair management/training and progress toward the following goals:    · Plan of Care Expires:  03/12/20    Subjective     Chief Complaint: "My back hurts from lying in bed."  Patient/Family Comments/goals: Pt agreed to tx  Pain/Comfort:  · Pain Rating 1: (c/o low back pain, but did not rate)  · Pain Rating Post-Intervention 1: (as above)      Objective:     Communicated with RN(sienna) prior to session.  Patient found supine with peripheral IV, telemetry upon PT entry to room.     General Precautions: Standard, fall   Orthopedic Precautions:    Braces:       Functional Mobility:  · Bed Mobility:     · Rolling Right: contact guard assistance and vc's  · Scooting: stand by assistance, contact guard " assistance and to EOB  · Supine to Sit: minimum assistance  · Transfers:     · Sit to Stand:  minimum assistance with rolling walker and vc's for hand placement  · Gait: 20ft and 15ft with RW and Candice/CGA with a seated rest break between bouts.  Pt ambulates wtih decreased step length, trunk flexion, and too close proximity to RW.  VC's to correct all, and visual demonstration on proper proximity when ambulating the RW.  Candice for RW management from wall x 2 bouts.  Pt is impulsive at times with decreased safety awareness.  · Balance: sitting good-, standing fair, gait fair-/fair      AM-PAC 6 CLICK MOBILITY  Turning over in bed (including adjusting bedclothes, sheets and blankets)?: 3  Sitting down on and standing up from a chair with arms (e.g., wheelchair, bedside commode, etc.): 3  Moving from lying on back to sitting on the side of the bed?: 3  Moving to and from a bed to a chair (including a wheelchair)?: 3  Need to walk in hospital room?: 3  Climbing 3-5 steps with a railing?: 1  Basic Mobility Total Score: 16       Therapeutic Activities and Exercises:   Seated BLE therex: AP, LAQ, hip flexion/ABD/ADD x 15 reps with vc's and occasional Candice for proper technique.  VC's to slow down.  Pt transferred to b/s chair with Candice/CGA and RW.  VC's to turn around completely prior to sitting.  Lunch tray placed in front of pt after tx.    Patient left up in chair with all lines intact, call button in reach, chair alarm on and RN notified..    GOALS:   Multidisciplinary Problems     Physical Therapy Goals        Problem: Physical Therapy Goal    Goal Priority Disciplines Outcome Goal Variances Interventions   Physical Therapy Goal     PT, PT/OT Ongoing, Progressing     Description:  1. Ambulate 40' with RW with CG  2.  Supine to/from sit with supervision  3.  Bed to/from chair with CG with RW  4.  Up in chair 2 hours                      Time Tracking:     PT Received On: 02/13/20  PT Start Time: 1146     PT Stop Time:  1217  PT Total Time (min): 31 min     Billable Minutes: Gait Training 15 and Therapeutic Activity 16    Treatment Type: Treatment  PT/PTA: PTA     PTA Visit Number: 1     Dorothy George PTA  02/13/2020

## 2020-02-13 NOTE — PLAN OF CARE
Patient AAOx4, VSS, patient c/o pain in abdomen, managed with PRN medications. Free from falls. Call bell in reach. Bed alarm in place. Safety maintained. Will continue to monitor.   Problem: Fall Injury Risk  Goal: Absence of Fall and Fall-Related Injury  Outcome: Ongoing, Progressing     Problem: Adult Inpatient Plan of Care  Goal: Plan of Care Review  Outcome: Ongoing, Progressing  Goal: Patient-Specific Goal (Individualization)  Outcome: Ongoing, Progressing  Goal: Absence of Hospital-Acquired Illness or Injury  Outcome: Ongoing, Progressing  Goal: Optimal Comfort and Wellbeing  Outcome: Ongoing, Progressing  Goal: Readiness for Transition of Care  Outcome: Ongoing, Progressing  Goal: Rounds/Family Conference  Outcome: Ongoing, Progressing     Problem: Glycemic Control Impaired (Sepsis/Septic Shock)  Goal: Blood Glucose Level Within Desired Range  Outcome: Ongoing, Progressing     Problem: Hemodynamic Instability (Sepsis/Septic Shock)  Goal: Effective Tissue Perfusion  Outcome: Ongoing, Progressing

## 2020-02-14 PROBLEM — A41.4 SEPSIS DUE TO KLEBSIELLA PNEUMONIAE: Status: ACTIVE | Noted: 2020-02-12

## 2020-02-14 LAB
ANION GAP SERPL CALC-SCNC: 12 MMOL/L (ref 8–16)
BACTERIA BLD CULT: ABNORMAL
BASOPHILS # BLD AUTO: 0.01 K/UL (ref 0–0.2)
BASOPHILS NFR BLD: 0.1 % (ref 0–1.9)
BILIRUB UR QL STRIP: NEGATIVE
BUN SERPL-MCNC: 14 MG/DL (ref 8–23)
CALCIUM SERPL-MCNC: 8.3 MG/DL (ref 8.7–10.5)
CHLORIDE SERPL-SCNC: 100 MMOL/L (ref 95–110)
CLARITY UR: CLEAR
CO2 SERPL-SCNC: 24 MMOL/L (ref 23–29)
COLOR UR: YELLOW
CREAT SERPL-MCNC: 1.3 MG/DL (ref 0.5–1.4)
DIFFERENTIAL METHOD: ABNORMAL
EOSINOPHIL # BLD AUTO: 0.2 K/UL (ref 0–0.5)
EOSINOPHIL NFR BLD: 2.3 % (ref 0–8)
ERYTHROCYTE [DISTWIDTH] IN BLOOD BY AUTOMATED COUNT: 15.2 % (ref 11.5–14.5)
EST. GFR  (AFRICAN AMERICAN): 42 ML/MIN/1.73 M^2
EST. GFR  (NON AFRICAN AMERICAN): 37 ML/MIN/1.73 M^2
GLUCOSE SERPL-MCNC: 92 MG/DL (ref 70–110)
GLUCOSE UR QL STRIP: NEGATIVE
HCT VFR BLD AUTO: 29.2 % (ref 37–48.5)
HGB BLD-MCNC: 8.9 G/DL (ref 12–16)
HGB UR QL STRIP: ABNORMAL
IMM GRANULOCYTES # BLD AUTO: 0.2 K/UL (ref 0–0.04)
IMM GRANULOCYTES NFR BLD AUTO: 2.7 % (ref 0–0.5)
KETONES UR QL STRIP: NEGATIVE
LEUKOCYTE ESTERASE UR QL STRIP: NEGATIVE
LYMPHOCYTES # BLD AUTO: 0.8 K/UL (ref 1–4.8)
LYMPHOCYTES NFR BLD: 11.3 % (ref 18–48)
MAGNESIUM SERPL-MCNC: 2.2 MG/DL (ref 1.6–2.6)
MCH RBC QN AUTO: 30.7 PG (ref 27–31)
MCHC RBC AUTO-ENTMCNC: 30.5 G/DL (ref 32–36)
MCV RBC AUTO: 101 FL (ref 82–98)
MONOCYTES # BLD AUTO: 0.7 K/UL (ref 0.3–1)
MONOCYTES NFR BLD: 9.9 % (ref 4–15)
NEUTROPHILS # BLD AUTO: 5.5 K/UL (ref 1.8–7.7)
NEUTROPHILS NFR BLD: 73.7 % (ref 38–73)
NITRITE UR QL STRIP: NEGATIVE
NRBC BLD-RTO: 0 /100 WBC
PH UR STRIP: 6 [PH] (ref 5–8)
PHOSPHATE SERPL-MCNC: 3.6 MG/DL (ref 2.7–4.5)
PLATELET # BLD AUTO: 259 K/UL (ref 150–350)
PMV BLD AUTO: 9.8 FL (ref 9.2–12.9)
POCT GLUCOSE: 103 MG/DL (ref 70–110)
POCT GLUCOSE: 107 MG/DL (ref 70–110)
POTASSIUM SERPL-SCNC: 4 MMOL/L (ref 3.5–5.1)
PROT UR QL STRIP: NEGATIVE
RBC # BLD AUTO: 2.9 M/UL (ref 4–5.4)
SODIUM SERPL-SCNC: 136 MMOL/L (ref 136–145)
SP GR UR STRIP: 1.01 (ref 1–1.03)
URN SPEC COLLECT METH UR: ABNORMAL
UROBILINOGEN UR STRIP-ACNC: NEGATIVE EU/DL
WBC # BLD AUTO: 7.45 K/UL (ref 3.9–12.7)

## 2020-02-14 PROCEDURE — 83735 ASSAY OF MAGNESIUM: CPT | Mod: HCNC

## 2020-02-14 PROCEDURE — 25000003 PHARM REV CODE 250: Mod: HCNC | Performed by: HOSPITALIST

## 2020-02-14 PROCEDURE — 80048 BASIC METABOLIC PNL TOTAL CA: CPT | Mod: HCNC

## 2020-02-14 PROCEDURE — 81003 URINALYSIS AUTO W/O SCOPE: CPT | Mod: HCNC

## 2020-02-14 PROCEDURE — 63600175 PHARM REV CODE 636 W HCPCS: Mod: HCNC | Performed by: NURSE PRACTITIONER

## 2020-02-14 PROCEDURE — 63600175 PHARM REV CODE 636 W HCPCS: Mod: HCNC | Performed by: HOSPITALIST

## 2020-02-14 PROCEDURE — 21400001 HC TELEMETRY ROOM: Mod: HCNC

## 2020-02-14 PROCEDURE — 99900035 HC TECH TIME PER 15 MIN (STAT): Mod: HCNC

## 2020-02-14 PROCEDURE — 51701 INSERT BLADDER CATHETER: CPT | Mod: HCNC

## 2020-02-14 PROCEDURE — 25000003 PHARM REV CODE 250: Mod: HCNC | Performed by: NURSE PRACTITIONER

## 2020-02-14 PROCEDURE — 36415 COLL VENOUS BLD VENIPUNCTURE: CPT | Mod: HCNC

## 2020-02-14 PROCEDURE — 97530 THERAPEUTIC ACTIVITIES: CPT | Mod: HCNC,CQ

## 2020-02-14 PROCEDURE — 94761 N-INVAS EAR/PLS OXIMETRY MLT: CPT | Mod: HCNC

## 2020-02-14 PROCEDURE — 85025 COMPLETE CBC W/AUTO DIFF WBC: CPT | Mod: HCNC

## 2020-02-14 PROCEDURE — 84100 ASSAY OF PHOSPHORUS: CPT | Mod: HCNC

## 2020-02-14 PROCEDURE — 63600175 PHARM REV CODE 636 W HCPCS: Mod: HCNC | Performed by: FAMILY MEDICINE

## 2020-02-14 PROCEDURE — 97116 GAIT TRAINING THERAPY: CPT | Mod: HCNC,CQ

## 2020-02-14 RX ORDER — OXYCODONE AND ACETAMINOPHEN 5; 325 MG/1; MG/1
1 TABLET ORAL EVERY 8 HOURS PRN
Status: DISCONTINUED | OUTPATIENT
Start: 2020-02-14 | End: 2020-02-18 | Stop reason: HOSPADM

## 2020-02-14 RX ORDER — CEFAZOLIN SODIUM 2 G/50ML
2 SOLUTION INTRAVENOUS
Status: DISCONTINUED | OUTPATIENT
Start: 2020-02-14 | End: 2020-02-18 | Stop reason: HOSPADM

## 2020-02-14 RX ADMIN — STANDARDIZED SENNA CONCENTRATE AND DOCUSATE SODIUM 1 TABLET: 8.6; 5 TABLET ORAL at 09:02

## 2020-02-14 RX ADMIN — PIPERACILLIN SODIUM AND TAZOBACTAM SODIUM 4.5 G: 4; .5 INJECTION, POWDER, LYOPHILIZED, FOR SOLUTION INTRAVENOUS at 04:02

## 2020-02-14 RX ADMIN — OXYCODONE HYDROCHLORIDE AND ACETAMINOPHEN 1 TABLET: 5; 325 TABLET ORAL at 05:02

## 2020-02-14 RX ADMIN — PIPERACILLIN SODIUM AND TAZOBACTAM SODIUM 4.5 G: 4; .5 INJECTION, POWDER, LYOPHILIZED, FOR SOLUTION INTRAVENOUS at 08:02

## 2020-02-14 RX ADMIN — VENLAFAXINE HYDROCHLORIDE 37.5 MG: 37.5 CAPSULE, EXTENDED RELEASE ORAL at 09:02

## 2020-02-14 RX ADMIN — CEFAZOLIN SODIUM 2 G: 2 SOLUTION INTRAVENOUS at 10:02

## 2020-02-14 RX ADMIN — METOPROLOL TARTRATE 25 MG: 25 TABLET ORAL at 09:02

## 2020-02-14 RX ADMIN — THERA TABS 1 TABLET: TAB at 09:02

## 2020-02-14 RX ADMIN — HYDRALAZINE HYDROCHLORIDE 10 MG: 20 INJECTION INTRAMUSCULAR; INTRAVENOUS at 01:02

## 2020-02-14 RX ADMIN — LEVOTHYROXINE SODIUM 100 MCG: 100 TABLET ORAL at 05:02

## 2020-02-14 RX ADMIN — ONDANSETRON 4 MG: 2 INJECTION INTRAMUSCULAR; INTRAVENOUS at 05:02

## 2020-02-14 RX ADMIN — PIPERACILLIN SODIUM AND TAZOBACTAM SODIUM 4.5 G: 4; .5 INJECTION, POWDER, LYOPHILIZED, FOR SOLUTION INTRAVENOUS at 01:02

## 2020-02-14 RX ADMIN — PANTOPRAZOLE SODIUM 40 MG: 40 TABLET, DELAYED RELEASE ORAL at 09:02

## 2020-02-14 RX ADMIN — TRAMADOL HYDROCHLORIDE 50 MG: 50 TABLET, FILM COATED ORAL at 01:02

## 2020-02-14 RX ADMIN — ROSUVASTATIN CALCIUM 10 MG: 10 TABLET, COATED ORAL at 09:02

## 2020-02-14 RX ADMIN — APIXABAN 5 MG: 2.5 TABLET, FILM COATED ORAL at 09:02

## 2020-02-14 RX ADMIN — TRAMADOL HYDROCHLORIDE 50 MG: 50 TABLET, FILM COATED ORAL at 09:02

## 2020-02-14 RX ADMIN — AMIODARONE HYDROCHLORIDE 200 MG: 200 TABLET ORAL at 09:02

## 2020-02-14 RX ADMIN — OXYCODONE HYDROCHLORIDE AND ACETAMINOPHEN 1 TABLET: 5; 325 TABLET ORAL at 09:02

## 2020-02-14 NOTE — PLAN OF CARE
Patient on bed awake. C/o pain on abdominal area, pain medications given as ordered. BP elevated, Prn hydralazine given, BP back to baseline. No c/o headache or dizziness. All other medicine given. On Iv antibiotics. Safety and comfort ensured.

## 2020-02-14 NOTE — PLAN OF CARE
Pt A&O x 4. On cardiac diet, tolerating well. C/o pain on abdomen, rpn medication given. IV antibiotics and IV fluids given via left PIV. Safety and comfort maintained. Call light within reach

## 2020-02-14 NOTE — PT/OT/SLP PROGRESS
Physical Therapy Treatment    Patient Name:  Katherine Hernandez   MRN:  [id number]    Recommendations:     Discharge Recommendations:  home with home health, home health PT, home health OT   Discharge Equipment Recommendations: (TBD)   Barriers to discharge: decreased strength, endurance, and functional mobility    Assessment:     Katherine Hernandez is a 88 y.o. female admitted with a medical diagnosis of Gram-negative bacteremia.  She presents with the following impairments/functional limitations:  weakness, impaired endurance, impaired self care skills, gait instability, impaired balance, decreased coordination, decreased upper extremity function, decreased lower extremity function, decreased safety awareness, decreased ROM. Pt would continue to benefit from P.T. To address impairments listed above.  .    Rehab Prognosis: Fair; patient would benefit from acute skilled PT services to address these deficits and reach maximum level of function.    Recent Surgery: * No surgery found *      Plan:     During this hospitalization, patient to be seen 5 x/week to address the identified rehab impairments via gait training, therapeutic activities, therapeutic exercises, neuromuscular re-education, wheelchair management/training and progress toward the following goals:    · Plan of Care Expires:  03/12/20    Subjective     Patient/Family Comments/goals: Pt agreed to tx    Pain/Comfort:  · Pain Rating 1: 0/10  · Pain Rating Post-Intervention 1: 0/10      Objective:     Communicated with RN (Elvis) prior to session.  Patient found supine with telemetry, peripheral IV upon PT entry to room.     General Precautions: Standard, fall   Orthopedic Precautions:    Braces:       Functional Mobility:  · Bed Mobility:     · Rolling Right: moderate assistance using b/r to assist  · Transfers:     · Sit to Stand:  minimum assistance with rolling walker vc's for hand placement  · Bed to Chair: minimum assistance with  rolling walker  using  Step  Transfer vc's to bring RW around completely prior to sitting  · Gait: 30ft, seated rest, 15ft with RW and Candice/CGA.  Pt ambulates with decreased step length, kiesha, and flexed posture.  Pt is able to come upright, but not completely, and has difficulty maintaining.  VC's for closer proximity to RW for increased safety. Pt's gait is unsteady, but improved from 2/13/20.  · Balance: sitting fair+, standing fair/fair+, gait fair-      AM-PAC 6 CLICK MOBILITY  Turning over in bed (including adjusting bedclothes, sheets and blankets)?: 3  Sitting down on and standing up from a chair with arms (e.g., wheelchair, bedside commode, etc.): 3  Moving from lying on back to sitting on the side of the bed?: 3  Moving to and from a bed to a chair (including a wheelchair)?: 3  Need to walk in hospital room?: 3  Climbing 3-5 steps with a railing?: 1  Basic Mobility Total Score: 16       Therapeutic Activities and Exercises:  Pt on wall unit O2 @ 2lpm NC with extended line.  Static standing  For ~3 mins  with CGA/SBA and RW while assisting pt with doffing diaper, hygiene needs, and donning new diaper.  Seated BLE therex: AP, LAQ, hip flexion x 15 reps with vc's for proper technique.  Pt left sitting up in b/s chair, LEs elevated.    Patient left up in chair with all lines intact, call button in reach, chair alarm on and RN notified..    GOALS:   Multidisciplinary Problems     Physical Therapy Goals        Problem: Physical Therapy Goal    Goal Priority Disciplines Outcome Goal Variances Interventions   Physical Therapy Goal     PT, PT/OT Ongoing, Progressing     Description:  1. Ambulate 40' with RW with CG  2.  Supine to/from sit with supervision  3.  Bed to/from chair with CG with RW  4.  Up in chair 2 hours                      Time Tracking:     PT Received On: 02/14/20  PT Start Time: 1443     PT Stop Time: 1514  PT Total Time (min): 31 min     Billable Minutes: Gait Training 15 and Therapeutic Activity 16    Treatment Type:  Treatment  PT/PTA: PTA     PTA Visit Number: 2     Dorothy George, PTA  02/14/2020

## 2020-02-14 NOTE — PROGRESS NOTES
ID final recommendations are pending at this time. At discharge patient will need home health. TN will continue to follow.

## 2020-02-14 NOTE — HOSPITAL COURSE
Blood cultures taken on admission grew pan-sensitive Klebsiella pneumoniae, treated with cefazolin. Abdomen CT showed no etiology. Infectious Disease recommended consulting Gastroenterology, who performed ERCP with biliary sphincterotomy and balloon extraction of choledocholithiasis. Infectious Disease recommended continuing cefazolin until 2/27/20. AST and ALT increased to 355 and 105 on 2/18/20. A PICC was placed and she was discharged with home health. Labs will be checked by home health.

## 2020-02-14 NOTE — SUBJECTIVE & OBJECTIVE
Interval History: no acute events overnight.     Review of Systems   Constitutional: Negative for fever.   HENT: Positive for hearing loss. Negative for congestion.    Respiratory: Negative for shortness of breath.    Cardiovascular: Negative for chest pain.   Gastrointestinal: Positive for abdominal pain. Negative for nausea and vomiting.     Objective:     Vital Signs (Most Recent):  Temp: 97.7 °F (36.5 °C) (02/13/20 1618)  Pulse: 60 (02/13/20 1618)  Resp: 20 (02/13/20 1618)  BP: (!) 186/84 (02/13/20 1618)  SpO2: (!) 94 % (02/13/20 1616) Vital Signs (24h Range):  Temp:  [97.4 °F (36.3 °C)-98.5 °F (36.9 °C)] 97.7 °F (36.5 °C)  Pulse:  [60-67] 60  Resp:  [15-20] 20  SpO2:  [94 %] 94 %  BP: (133-186)/(67-84) 186/84     Weight: 76.5 kg (168 lb 10.4 oz)  Body mass index is 30.85 kg/m².    Intake/Output Summary (Last 24 hours) at 2/13/2020 1845  Last data filed at 2/13/2020 0524  Gross per 24 hour   Intake 250 ml   Output --   Net 250 ml      Physical Exam   Constitutional: She is oriented to person, place, and time. She appears well-developed and well-nourished.   HENT:   Head: Normocephalic and atraumatic.   Eyes: Pupils are equal, round, and reactive to light. Conjunctivae are normal.   Neck: Normal range of motion. No JVD present.   Cardiovascular: Normal rate, regular rhythm, normal heart sounds and intact distal pulses.   Pulmonary/Chest: Effort normal. No respiratory distress. She has no wheezes.   Abdominal: Soft. Bowel sounds are normal. She exhibits no distension. There is no tenderness. There is no guarding.   Musculoskeletal: Normal range of motion. She exhibits no edema or tenderness.   Neurological: She is alert and oriented to person, place, and time.   Skin: Skin is warm and dry. Capillary refill takes less than 2 seconds.   Psychiatric: She has a normal mood and affect. Her behavior is normal.       Significant Labs:   BMP:   Recent Labs   Lab 02/12/20  0540 02/13/20  0529   *  --      --     K 4.2  --      --    CO2 24  --    BUN 22  --    CREATININE 1.3  --    CALCIUM 7.7*  --    MG 2.6 2.4     CBC:   Recent Labs   Lab 02/11/20  1945 02/12/20  0540 02/13/20  0529   WBC 5.02 12.93* 9.69   HGB 9.5* 7.9* 9.1*   HCT 31.1* 26.2* 31.0*    239 247     Urine Studies:   Recent Labs   Lab 02/11/20  2139   COLORU Yellow   APPEARANCEUA Clear   PHUR 8.0   SPECGRAV 1.020   PROTEINUA Trace*   GLUCUA Negative   KETONESU Negative   BILIRUBINUA Negative   OCCULTUA Negative   NITRITE Negative   UROBILINOGEN Negative   LEUKOCYTESUR Negative       Significant Imaging: I have reviewed all pertinent imaging results/findings within the past 24 hours.

## 2020-02-14 NOTE — PROGRESS NOTES
[facility]  Progress Note    Patient Name: Katherine Hernandez  MRN: [id number]  Patient Class: IP- Inpatient   Admission Date: 2/11/2020  Length of Stay: 1 days  Attending Physician: Yoon Calvin*  Primary Care Provider: Israel Batista MD        Subjective:     Principal Problem:Gram-negative bacteremia        HPI:  Katherine Hernandez is an 89 yo female with HTN, HLD, chronic diastolic heart failure, paroxysmal atrial fibrillation, long tern anticoagulation use, carotid artery disease, chronic kidney disease stage 3, anemia of chronic disease, tachy-tejal syndrome, s/p pacemaker placement, chronic neck pain, chronic back pain and depression who presented to [facility] ED 2/11/20 with complaint of generalized body aches associated with chest pain, abdominal pain and n/v x 1. Family at bedside also reports intermittent dry nonproductive cough. No hematemesis or melena. Pt with low grade fever (Tmax 100.2) in ED. Initial labs remarkable for H/H 9.5/31, BUN/Cr 27/1.6 (previously 22/1.3). Troponin negative. No acute changes on telemetry. Influenza negative. CXR negative. She received Vancomycin/Zosyn and 2 liters normal saline. Patient admitted to [facility].     Overview/Hospital Course:  Blood cultures collected 2/11 positive for gram negative rods. Repeat cultures 2/13 pending. Pt afebrile. She reports lower abdominal/pelvic pain on exam. Nausea/vomiting resolved. CT abdomen pending.     Interval History: no acute events overnight.     Review of Systems   Constitutional: Negative for fever.   HENT: Positive for hearing loss. Negative for congestion.    Respiratory: Negative for shortness of breath.    Cardiovascular: Negative for chest pain.   Gastrointestinal: Positive for abdominal pain. Negative for nausea and vomiting.     Objective:     Vital Signs (Most Recent):  Temp: 97.7 °F (36.5 °C) (02/13/20 1618)  Pulse: 60 (02/13/20 1618)  Resp: 20  (02/13/20 1618)  BP: (!) 186/84 (02/13/20 1618)  SpO2: (!) 94 % (02/13/20 1616) Vital Signs (24h Range):  Temp:  [97.4 °F (36.3 °C)-98.5 °F (36.9 °C)] 97.7 °F (36.5 °C)  Pulse:  [60-67] 60  Resp:  [15-20] 20  SpO2:  [94 %] 94 %  BP: (133-186)/(67-84) 186/84     Weight: 76.5 kg (168 lb 10.4 oz)  Body mass index is 30.85 kg/m².    Intake/Output Summary (Last 24 hours) at 2/13/2020 1845  Last data filed at 2/13/2020 0524  Gross per 24 hour   Intake 250 ml   Output --   Net 250 ml      Physical Exam   Constitutional: She is oriented to person, place, and time. She appears well-developed and well-nourished.   HENT:   Head: Normocephalic and atraumatic.   Eyes: Pupils are equal, round, and reactive to light. Conjunctivae are normal.   Neck: Normal range of motion. No JVD present.   Cardiovascular: Normal rate, regular rhythm, normal heart sounds and intact distal pulses.   Pulmonary/Chest: Effort normal. No respiratory distress. She has no wheezes.   Abdominal: Soft. Bowel sounds are normal. She exhibits no distension. There is no tenderness. There is no guarding.   Musculoskeletal: Normal range of motion. She exhibits no edema or tenderness.   Neurological: She is alert and oriented to person, place, and time.   Skin: Skin is warm and dry. Capillary refill takes less than 2 seconds.   Psychiatric: She has a normal mood and affect. Her behavior is normal.       Significant Labs:   BMP:   Recent Labs   Lab 02/12/20  0540 02/13/20  0529   *  --      --    K 4.2  --      --    CO2 24  --    BUN 22  --    CREATININE 1.3  --    CALCIUM 7.7*  --    MG 2.6 2.4     CBC:   Recent Labs   Lab 02/11/20  1945 02/12/20  0540 02/13/20  0529   WBC 5.02 12.93* 9.69   HGB 9.5* 7.9* 9.1*   HCT 31.1* 26.2* 31.0*    239 247     Urine Studies:   Recent Labs   Lab 02/11/20 2139   COLORU Yellow   APPEARANCEUA Clear   PHUR 8.0   SPECGRAV 1.020   PROTEINUA Trace*   GLUCUA Negative   KETONESU Negative   BILIRUBINUA  Negative   OCCULTUA Negative   NITRITE Negative   UROBILINOGEN Negative   LEUKOCYTESUR Negative       Significant Imaging: I have reviewed all pertinent imaging results/findings within the past 24 hours.      Assessment/Plan:      * Gram-negative bacteremia  Afebrile x 24 hours. CXR negative. UA negative. Influenza negative. Lactic acid within normal limits.  No leukocytosis. Pt VSS. Blood culture positive for gram negative rods, susceptibility pending. Repeat cultures collected 2/13      Acute kidney injury superimposed on chronic kidney disease  CKD (chronic kidney disease) stage 3, GFR 30-59 ml/min    Baseline Cr 1.3. Initial Cr 1.6 on admit likely 2/2 volume depletion 2/2 n/v. Received IVF overnight. Renal function now at baseline continue to monitor,  Avoid nephrotoxic meds       GERD (gastroesophageal reflux disease)  Continue PPI       Anemia of chronic disease  H/H stable, monitor       Depression  Continue effexor       Hypertension  Hyperlipidemia   Paroxysmal atrial fibrillation  Chronic anticoagulation  CAD (coronary artery disease)  Chronic diastolic heart failure  Tachy-tejal syndrome s/p pacemaker placement     -68, patient denies chest pain on exam. Initial troponin negative. No acute findings on tele. No evidence of volume overload on exam     Trend cardiac enzymes   Interrogate pacemaker device   Continue amiodarone, apixaban, furosemide and metoprolol   Continue statin         VTE Risk Mitigation (From admission, onward)         Ordered     apixaban tablet 5 mg  2 times daily      02/12/20 0956     Place JENNIFER hose  Until discontinued      02/11/20 6312                      Michelle Gallegos NP  Department of Hospital Medicine   [facility]

## 2020-02-14 NOTE — PROGRESS NOTES
VN entered into the patient's room with permission via [employer] system. The patient was awake and alert sitting in the chair with her niece at the bedside. She c/o nausea and denies any pain. Patient is requesting medication for nausea. Will notify the bedside nurse. Progress notes, labs, and orders were reviewed. Will continue to monitor.      02/14/20 2216   Type of Frequent Check   Type Patient Rounds   Safety/Activity   Patient Rounds visualized patient;ID band on;bed wheels locked   Safety Promotion/Fall Prevention assistive device/personal item within reach   Activity Management up in chair - L3   Positioning   Body Position positioned/repositioned independently   Head of Bed (HOB) HOB at 30-45 degrees   Positioning/Transfer Devices pillows;in use   Pain/Comfort/Sleep   Preferred Pain Scale number (Numeric Rating Pain Scale)   Comfort/Acceptable Pain Level 0   Pain Rating (0-10): Rest 0   Pain Rating (0-10): Activity 0        Cardiac/Telemetry Details / Alarms   Cardiac/Telemetry Monitor On Yes   Cardiac/Telemetry Audible Yes   Cardiac/Telemetry Alarms Set Yes   ECG   Lead Monitored Lead II   Rhythm normal sinus rhythm   Assessments (Pre/Post)   Level of Consciousness (AVPU) alert

## 2020-02-14 NOTE — PLAN OF CARE
Patient resting in bed, AAOx4. Daughter at the bedside. Medications administered as ordered. Patient complained of all over body pain early in the shift unrelieved by PO PRN medications. Notified NP, orders received for one time IV toradol. Patient reported relief with toradol. Encouraged to call with needs or concerns. Will continue to monitor.

## 2020-02-15 LAB
ALBUMIN SERPL BCP-MCNC: 3 G/DL (ref 3.5–5.2)
ALP SERPL-CCNC: 70 U/L (ref 55–135)
ALT SERPL W/O P-5'-P-CCNC: 55 U/L (ref 10–44)
ANION GAP SERPL CALC-SCNC: 9 MMOL/L (ref 8–16)
AST SERPL-CCNC: 46 U/L (ref 10–40)
BASOPHILS # BLD AUTO: 0.01 K/UL (ref 0–0.2)
BASOPHILS NFR BLD: 0.2 % (ref 0–1.9)
BILIRUB DIRECT SERPL-MCNC: 0.2 MG/DL (ref 0.1–0.3)
BILIRUB SERPL-MCNC: 0.3 MG/DL (ref 0.1–1)
BUN SERPL-MCNC: 12 MG/DL (ref 8–23)
CALCIUM SERPL-MCNC: 8 MG/DL (ref 8.7–10.5)
CHLORIDE SERPL-SCNC: 99 MMOL/L (ref 95–110)
CO2 SERPL-SCNC: 29 MMOL/L (ref 23–29)
CREAT SERPL-MCNC: 1.5 MG/DL (ref 0.5–1.4)
DIFFERENTIAL METHOD: ABNORMAL
EOSINOPHIL # BLD AUTO: 0.3 K/UL (ref 0–0.5)
EOSINOPHIL NFR BLD: 5.1 % (ref 0–8)
ERYTHROCYTE [DISTWIDTH] IN BLOOD BY AUTOMATED COUNT: 15.4 % (ref 11.5–14.5)
EST. GFR  (AFRICAN AMERICAN): 36 ML/MIN/1.73 M^2
EST. GFR  (NON AFRICAN AMERICAN): 31 ML/MIN/1.73 M^2
GGT SERPL-CCNC: 49 U/L (ref 8–55)
GLUCOSE SERPL-MCNC: 82 MG/DL (ref 70–110)
HCT VFR BLD AUTO: 27.3 % (ref 37–48.5)
HGB BLD-MCNC: 8.3 G/DL (ref 12–16)
IMM GRANULOCYTES # BLD AUTO: 0.08 K/UL (ref 0–0.04)
IMM GRANULOCYTES NFR BLD AUTO: 1.5 % (ref 0–0.5)
LYMPHOCYTES # BLD AUTO: 0.7 K/UL (ref 1–4.8)
LYMPHOCYTES NFR BLD: 13 % (ref 18–48)
MAGNESIUM SERPL-MCNC: 2.2 MG/DL (ref 1.6–2.6)
MCH RBC QN AUTO: 30.5 PG (ref 27–31)
MCHC RBC AUTO-ENTMCNC: 30.4 G/DL (ref 32–36)
MCV RBC AUTO: 100 FL (ref 82–98)
MONOCYTES # BLD AUTO: 0.7 K/UL (ref 0.3–1)
MONOCYTES NFR BLD: 12.2 % (ref 4–15)
NEUTROPHILS # BLD AUTO: 3.6 K/UL (ref 1.8–7.7)
NEUTROPHILS NFR BLD: 68 % (ref 38–73)
NRBC BLD-RTO: 0 /100 WBC
PHOSPHATE SERPL-MCNC: 3.7 MG/DL (ref 2.7–4.5)
PLATELET # BLD AUTO: 281 K/UL (ref 150–350)
PMV BLD AUTO: 9.9 FL (ref 9.2–12.9)
POTASSIUM SERPL-SCNC: 3.1 MMOL/L (ref 3.5–5.1)
PROT SERPL-MCNC: 6.2 G/DL (ref 6–8.4)
RBC # BLD AUTO: 2.72 M/UL (ref 4–5.4)
SODIUM SERPL-SCNC: 137 MMOL/L (ref 136–145)
WBC # BLD AUTO: 5.32 K/UL (ref 3.9–12.7)

## 2020-02-15 PROCEDURE — 25000003 PHARM REV CODE 250: Mod: HCNC | Performed by: HOSPITALIST

## 2020-02-15 PROCEDURE — 36415 COLL VENOUS BLD VENIPUNCTURE: CPT | Mod: HCNC

## 2020-02-15 PROCEDURE — 21400001 HC TELEMETRY ROOM: Mod: HCNC

## 2020-02-15 PROCEDURE — 80048 BASIC METABOLIC PNL TOTAL CA: CPT | Mod: HCNC

## 2020-02-15 PROCEDURE — 25000003 PHARM REV CODE 250: Mod: HCNC | Performed by: NURSE PRACTITIONER

## 2020-02-15 PROCEDURE — 63600175 PHARM REV CODE 636 W HCPCS: Mod: HCNC | Performed by: NURSE PRACTITIONER

## 2020-02-15 PROCEDURE — 63600175 PHARM REV CODE 636 W HCPCS: Mod: HCNC | Performed by: HOSPITALIST

## 2020-02-15 PROCEDURE — 94761 N-INVAS EAR/PLS OXIMETRY MLT: CPT | Mod: HCNC

## 2020-02-15 PROCEDURE — 84100 ASSAY OF PHOSPHORUS: CPT | Mod: HCNC

## 2020-02-15 PROCEDURE — 99900038 HC OT GENERIC THERAPY SCREENING (STAT): Mod: HCNC

## 2020-02-15 PROCEDURE — 80076 HEPATIC FUNCTION PANEL: CPT | Mod: HCNC

## 2020-02-15 PROCEDURE — 82977 ASSAY OF GGT: CPT | Mod: HCNC

## 2020-02-15 PROCEDURE — 85025 COMPLETE CBC W/AUTO DIFF WBC: CPT | Mod: HCNC

## 2020-02-15 PROCEDURE — 83735 ASSAY OF MAGNESIUM: CPT | Mod: HCNC

## 2020-02-15 RX ADMIN — ONDANSETRON 4 MG: 2 INJECTION INTRAMUSCULAR; INTRAVENOUS at 09:02

## 2020-02-15 RX ADMIN — VENLAFAXINE HYDROCHLORIDE 37.5 MG: 37.5 CAPSULE, EXTENDED RELEASE ORAL at 09:02

## 2020-02-15 RX ADMIN — APIXABAN 5 MG: 2.5 TABLET, FILM COATED ORAL at 09:02

## 2020-02-15 RX ADMIN — CEFAZOLIN SODIUM 2 G: 2 SOLUTION INTRAVENOUS at 09:02

## 2020-02-15 RX ADMIN — LEVOTHYROXINE SODIUM 100 MCG: 100 TABLET ORAL at 06:02

## 2020-02-15 RX ADMIN — THERA TABS 1 TABLET: TAB at 09:02

## 2020-02-15 RX ADMIN — METOPROLOL TARTRATE 25 MG: 25 TABLET ORAL at 09:02

## 2020-02-15 RX ADMIN — ROSUVASTATIN CALCIUM 10 MG: 10 TABLET, COATED ORAL at 09:02

## 2020-02-15 RX ADMIN — PANTOPRAZOLE SODIUM 40 MG: 40 TABLET, DELAYED RELEASE ORAL at 09:02

## 2020-02-15 RX ADMIN — FUROSEMIDE 40 MG: 40 TABLET ORAL at 09:02

## 2020-02-15 RX ADMIN — STANDARDIZED SENNA CONCENTRATE AND DOCUSATE SODIUM 1 TABLET: 8.6; 5 TABLET ORAL at 09:02

## 2020-02-15 RX ADMIN — TRAMADOL HYDROCHLORIDE 50 MG: 50 TABLET, FILM COATED ORAL at 06:02

## 2020-02-15 RX ADMIN — OXYCODONE HYDROCHLORIDE AND ACETAMINOPHEN 1 TABLET: 5; 325 TABLET ORAL at 02:02

## 2020-02-15 RX ADMIN — AMIODARONE HYDROCHLORIDE 200 MG: 200 TABLET ORAL at 09:02

## 2020-02-15 RX ADMIN — OXYCODONE HYDROCHLORIDE AND ACETAMINOPHEN 1 TABLET: 5; 325 TABLET ORAL at 05:02

## 2020-02-15 NOTE — SUBJECTIVE & OBJECTIVE
"Interval History: afebrile. Patient had 2 "loose" stools this morning. Continues to have RUQ abd pain and some nausea. Repeat blood cultures NGTD. ID consulted for bacteremia     Review of Systems   Constitutional: Negative for chills and fever.   Respiratory: Negative for cough and shortness of breath.    Cardiovascular: Negative for chest pain and palpitations.   Gastrointestinal: Positive for abdominal pain and nausea. Negative for vomiting.   Genitourinary: Negative for dysuria.     Objective:     Vital Signs (Most Recent):  Temp: 98.3 °F (36.8 °C) (02/15/20 1656)  Pulse: 65 (02/15/20 1656)  Resp: 20 (02/15/20 1656)  BP: (!) 171/78 (02/15/20 1656)  SpO2: (!) 94 % (02/15/20 1530) Vital Signs (24h Range):  Temp:  [97.4 °F (36.3 °C)-98.3 °F (36.8 °C)] 98.3 °F (36.8 °C)  Pulse:  [60-65] 65  Resp:  [16-20] 20  SpO2:  [92 %-94 %] 94 %  BP: (130-171)/(60-78) 171/78     Weight: 75.2 kg (165 lb 12.6 oz)  Body mass index is 30.32 kg/m².    Intake/Output Summary (Last 24 hours) at 2/15/2020 1753  Last data filed at 2/15/2020 1100  Gross per 24 hour   Intake 150 ml   Output 244 ml   Net -94 ml      Physical Exam   Constitutional: She is oriented to person, place, and time. She appears well-developed and well-nourished. No distress.   Cardiovascular: Normal rate and regular rhythm.   No murmur heard.  Pulmonary/Chest: Effort normal and breath sounds normal. No respiratory distress. She has no wheezes.   Abdominal: Soft. Bowel sounds are normal. She exhibits no distension. There is tenderness in the right upper quadrant and epigastric area.   Musculoskeletal: She exhibits no edema.   Neurological: She is alert and oriented to person, place, and time.   Skin: Skin is warm and dry.   Psychiatric: She has a normal mood and affect. Her behavior is normal.   Nursing note and vitals reviewed.      Significant Labs: All pertinent labs within the past 24 hours have been reviewed.    Significant Imaging: I have reviewed all pertinent " imaging results/findings within the past 24 hours.

## 2020-02-15 NOTE — PT/OT/SLP PROGRESS
Occupational Therapy  Screen - Discharge    Name: Katherine Hernandez  MRN: [id number]  Admitting Diagnosis:  Sepsis due to Klebsiella pneumoniae       Recommendations:     Discharge Recommendations: home health OT  Discharge Equipment Recommendations:  none  Barriers to discharge:  None    Assessment:     Katherine Hernandez is a 88 y.o. female with a medical diagnosis of Sepsis due to Klebsiella pneumoniae.  She presents with the following performance deficits affecting function are weakness, impaired endurance, impaired self care skills, impaired balance, gait instability, impaired functional mobilty, decreased coordination, decreased lower extremity function, decreased upper extremity function, decreased safety awareness, pain, impaired coordination, decreased ROM. Pt received new orders for OT to evaluate following discharge on 02/12/2020.  No changes noted during OT screen as pt is currently functioning at [facility] where she required assistance with ADLs (bathing, dressing, and toileting).  All DME needs are in place.  Recommendations are to discharge from Acute OT and evaluate for HHOT upon discharge to make sure home setting is meeting needs for safety and function in the area of ADLs.     Rehab Prognosis:  Recommendations are to discharge from Acute OT and evaluate for HHOT upon discharge to make sure home setting is meeting needs for safety and function in the area of ADLs.     Plan:     Patient to be seen (discharge from OT) to address the above listed problems via    · Plan of Care Expires: 03/16/20  · Plan of Care Reviewed with: patient, family    Subjective     Pain/Comfort:  · Pain Rating 1: (no numerical value given)  · Location 1: head(headache)  · Pain Addressed 1: Distraction, Cessation of Activity, Nurse notified  · Pain Rating Post-Intervention 1: (numerical value not given)    Objective:     Communicated with: nurse prior to session.  Patient found HOB elevated with bed alarm, telemetry, peripheral  IV(daughter at her side) upon OT entry to room.    General Precautions: Standard, fall   Orthopedic Precautions:N/A   Braces: N/A     Occupational Performance:     Bed Mobility:    · Pt able to perform rolling using bed rail with SBA  · Supine to sit with min A to lift trunk all of the way  · Scoot up in bed using draw sheet = max x2     Functional Mobility/Transfers:  · Sit to stand using RW with Min A  · Side steps to left (~5) with CGA  · Limited mobility screen due to pt's c/o of increased nausea    Activities of Daily Living:  · UB dressing for hospital gown = min A  · LB dressing = max A (socks)  · Toileting = incontinent of bowels - max A      AMPAC 6 Click ADL: 16    Treatment & Education:  · Pt completed ADLs and func mobility activities for tx session as noted above  · Pt at baseline for ADLs  · Noted with some slow and limited movements related to nausea and diarrhea  · POC discussed with pt and family member who relayed that pt is at baseline in regards to ADLs      Patient left HOB elevated with all lines intact, call button in reach and nurse notifiedEducation:      GOALS:   Multidisciplinary Problems     Occupational Therapy Goals        Problem: Occupational Therapy Goal    Goal Priority Disciplines Outcome Interventions   Occupational Therapy Goal     OT, PT/OT Adequate for Care Transition                    Time Tracking:     OT Date of Treatment: 02/15/20  OT Start Time: 0815  OT Stop Time: 0834  OT Total Time (min): 19 min    Billable Minutes: OT screen 19 minutes    Samia Medina OT  2/15/2020

## 2020-02-15 NOTE — PLAN OF CARE
VN cued into room.  Pt resting comfortably in bed with call light and personal belongings within reach.  NADN.  Family at bedside.  Pt hard of hearing.   VN will continue to follow and be available as needed.

## 2020-02-15 NOTE — SUBJECTIVE & OBJECTIVE
Interval History: Not eating much. Still with some abdominal pain and nausea. No vomiting.     Review of Systems   Constitutional: Negative for chills and fever.   Respiratory: Negative for cough and shortness of breath.    Cardiovascular: Negative for chest pain and palpitations.   Gastrointestinal: Positive for nausea. Negative for vomiting.     Objective:     Vital Signs (Most Recent):  Temp: 97.6 °F (36.4 °C) (02/14/20 1152)  Pulse: 60 (02/14/20 1556)  Resp: 18 (02/14/20 1152)  BP: 135/74 (02/14/20 1330)  SpO2: 96 % (02/14/20 1500) Vital Signs (24h Range):  Temp:  [97.6 °F (36.4 °C)-98.5 °F (36.9 °C)] 97.6 °F (36.4 °C)  Pulse:  [60-75] 60  Resp:  [16-20] 18  SpO2:  [93 %-96 %] 96 %  BP: (135-182)/(65-82) 135/74     Weight: 75.6 kg (166 lb 10.7 oz)  Body mass index is 30.48 kg/m².    Intake/Output Summary (Last 24 hours) at 2/14/2020 1807  Last data filed at 2/14/2020 1245  Gross per 24 hour   Intake 655 ml   Output 706 ml   Net -51 ml      Physical Exam   Constitutional: She is oriented to person, place, and time. She appears well-developed and well-nourished. No distress.   Cardiovascular: Normal rate and regular rhythm.   No murmur heard.  Pulmonary/Chest: Effort normal and breath sounds normal. No respiratory distress. She has no wheezes.   Abdominal: Soft. Bowel sounds are normal. She exhibits no distension. There is tenderness in the right upper quadrant and epigastric area.   Musculoskeletal: She exhibits no edema.   Neurological: She is alert and oriented to person, place, and time.   Skin: Skin is warm and dry.   Psychiatric: She has a normal mood and affect. Her behavior is normal.   Nursing note and vitals reviewed.      Significant Labs:   Blood Culture:   Recent Labs   Lab 02/13/20  1040 02/13/20  1049   LABBLOO No Growth to date  No Growth to date No Growth to date  No Growth to date     CBC:   Recent Labs   Lab 02/13/20  0529 02/14/20  0454   WBC 9.69 7.45   HGB 9.1* 8.9*   HCT 31.0* 29.2*   PLT  247 259     CMP:   Recent Labs   Lab 02/14/20  0454      K 4.0      CO2 24   GLU 92   BUN 14   CREATININE 1.3   CALCIUM 8.3*   ANIONGAP 12   EGFRNONAA 37*     Magnesium:   Recent Labs   Lab 02/13/20  0529 02/14/20  0454   MG 2.4 2.2     POCT Glucose:   Recent Labs   Lab 02/14/20  1149 02/14/20  1612   POCTGLUCOSE 107 103       Significant Imaging: I have reviewed all pertinent imaging results/findings within the past 24 hours.

## 2020-02-15 NOTE — PT/OT/SLP PROGRESS
"Speech Language Pathology  Attempted Visit    Katherine Hernandez  MRN: [id number]    ~900 AM: SLP attempted to see pt this AM for clinical swallow evaluation d/t c/o dysphagia, per SLP eval and treat orders. However, patient deferred swallow eval at this time d/t c/o nausea -- pt also reported "I can swallow! It [pills, solids] just gets stuck right here [pt pointing to area located above/at suprasternal notch]" SLP acknowledged and informed pt that SLP will return later this date to assess as able. SLP also notified RN of attempted eval and pt's c/o nausea -- MADISYN Pierce acknowledged.       ~1020 AM: SLP returned to attempt clinical swallow eval again -- however, SLP pt's niece standing outside of room on the phone upon SLP arrival and reported "she's finally sleeping peacefully," and requested SLP to return later -- SLP acknowledged. SLP notified MADISYN Pierce of outcome -- RN acknowledged and reported pt tolerate 2-3 whole meds with liquids without difficulty; however, required remaining few pills to be crushed and mixed into applesauce, which pt also tolerated without difficulty. SLP will assess pt next date     CUCO Cortes, CCC-SLP  2/15/2020    "

## 2020-02-15 NOTE — ASSESSMENT & PLAN NOTE
87 y/o female with HTN, HLD, CHF, a fib, Carotid artery disease, CKD3, anemia, tachy tejal, s/p pacemaker, chronic neck pain and back pain presented to [facility] 2/11 with body aches, nausea vomiting and diarrhea with low grade fever 100.2 in ED. Was admitted and BC on admit positive for klebsiella pneumoniae sensitive to all. Patient was originally on vanco and zosyn and was streamlined to cefazolin on 2/14. Patient with abdominal pain. Ct abdomen and pelvis - s/p cholecystectomy and slight dilation of common bile duct, sigmoid diverticulosis, bilateral small pleural effusions.     Rec:  Continue cefazolin 2 grams IVPB every 12 h as is for now (cr cl around 24.6)  Recommend echocardiogram rule out endocarditis -patient has a heart murmur.   Recommend further workup of abdomen and gallbladder bed (s/p cholecystectomy) - still with RUQ pain and epigastric pain - although this has diminished somewhat.   Consider GI evaluation  May sang MRI MRCP if possible (given renal function)  Not clear on source of the klebsiella bacteremia at this point  Watch LTFs  Watch diarrhea - may need workup if it persists

## 2020-02-15 NOTE — ASSESSMENT & PLAN NOTE
CKD (chronic kidney disease) stage 3, GFR 30-59 ml/min    Creatinine stable at her baseline Cr 1.3.   Avoid nephrotoxic meds   Continue to monitor off IV fluids, since has poor po intake still.

## 2020-02-15 NOTE — PLAN OF CARE
Patient is AAO X 4. Vital signs stable. Tramadol and Percocet given for pain with good effect. On tele monitor shows NSR. Slept well during night. Incontinent of urine. Safety maintained. IV antibiotics given. Will continue to monitor patient.

## 2020-02-15 NOTE — ASSESSMENT & PLAN NOTE
Hyperlipidemia   Paroxysmal atrial fibrillation  Chronic anticoagulation  CAD (coronary artery disease)  Chronic diastolic heart failure  Tachy-tejal syndrome s/p pacemaker placement     SBP ranged 140 to 186.   Continue amiodarone, apixaban, furosemide and metoprolol   Continue statin  Telemetry monitoring.

## 2020-02-15 NOTE — SUBJECTIVE & OBJECTIVE
Past Medical History:   Diagnosis Date    Anticoagulant long-term use     Anxiety     Arthritis     Bronchitis 11/13/2014    CAD (coronary artery disease)     Cancer     Chronic back pain     Chronic diastolic CHF (congestive heart failure)     Encounter for blood transfusion     GERD (gastroesophageal reflux disease)     Hyperlipidemia     Hypertension     Pacemaker     Paroxysmal A-fib     Thyroid disease        Past Surgical History:   Procedure Laterality Date    CARDIAC SURGERY      pacemaker placement    CERVICAL SPINE SURGERY      CHOLECYSTECTOMY      HIP SURGERY         Review of patient's allergies indicates:   Allergen Reactions    Ace inhibitors     Diovan hct [valsartan-hydrochlorothiazide] Swelling    Fosinopril sodium Swelling    Codeine Rash    Niaspan extended-release [niacin] Rash       Medications:  Medications Prior to Admission   Medication Sig    amiodarone (PACERONE) 200 MG Tab Take 1 tablet (200 mg total) by mouth once daily.    apixaban (ELIQUIS) 5 mg Tab Take 5 mg by mouth 2 (two) times daily.     CA CARB/D3/MAG OX//ANNA/ZN (CALTRATE + D3 PLUS MINERALS ORAL) Take 1 tablet by mouth once daily.     levothyroxine (SYNTHROID) 25 MCG tablet Take 1 tablet (25 mcg total) by mouth before breakfast. (Patient taking differently: Take 100 mcg by mouth before breakfast. )    metoprolol tartrate (LOPRESSOR) 25 MG tablet Take 1 tablet (25 mg total) by mouth 2 (two) times daily.    multivitamin (THERAGRAN) per tablet Take 1 tablet by mouth once daily.    omeprazole (PRILOSEC) 20 MG capsule Take 20 mg by mouth daily as needed.     oxyCODONE-acetaminophen (PERCOCET) 5-325 mg per tablet Take 1 tablet by mouth every 4 (four) hours as needed (pain 5-10/10 pain scale).    polycarbophil (FIBERCON) 625 mg tablet Take 625 mg by mouth once daily.    venlafaxine (EFFEXOR-XR) 37.5 MG 24 hr capsule TAKE 1 CAPSULE (37.5 MG TOTAL) BY MOUTH ONCE DAILY.    albuterol 90 mcg/actuation  inhaler Inhale 2 puffs into the lungs every 6 (six) hours as needed for Wheezing. Rescue    furosemide (LASIX) 40 MG tablet Take 40 mg by mouth every other day.     gemfibrozil (LOPID) 600 MG tablet Take 300 mg by mouth. Take 1/2 tablet QHS    losartan (COZAAR) 25 MG tablet Take 1 tablet (25 mg total) by mouth every evening.    rosuvastatin (CRESTOR) 10 MG tablet Take 10 mg by mouth every evening.    senna-docusate 8.6-50 mg (SENNA WITH DOCUSATE SODIUM) 8.6-50 mg per tablet Take 1 tablet by mouth every evening.      Antibiotics (From admission, onward)    Start     Stop Route Frequency Ordered    20 1915  cefazolin (ANCEF) 2 gram in dextrose 5% 50 mL IVPB (premix)      -- IV Every 12 hours (non-standard times) 20 1800        Antifungals (From admission, onward)    None        Antivirals (From admission, onward)    None           Immunization History   Administered Date(s) Administered    Influenza 10/17/2014    Influenza - High Dose - PF (65 years and older) 2015, 10/18/2016, 10/19/2017    Pneumococcal Polysaccharide - 23 Valent 10/19/2017    Zoster 2017       Family History     None        Social History     Socioeconomic History    Marital status:      Spouse name: Not on file    Number of children: Not on file    Years of education: Not on file    Highest education level: Not on file   Occupational History    Not on file   Social Needs    Financial resource strain: Not on file    Food insecurity:     Worry: Not on file     Inability: Not on file    Transportation needs:     Medical: Not on file     Non-medical: Not on file   Tobacco Use    Smoking status: Former Smoker     Last attempt to quit: 1984     Years since quittin.5    Smokeless tobacco: Never Used   Substance and Sexual Activity    Alcohol use: No    Drug use: No    Sexual activity: Never   Lifestyle    Physical activity:     Days per week: Not on file     Minutes per session: Not on file     Stress: Not on file   Relationships    Social connections:     Talks on phone: Not on file     Gets together: Not on file     Attends Confucianist service: Not on file     Active member of club or organization: Not on file     Attends meetings of clubs or organizations: Not on file     Relationship status: Not on file   Other Topics Concern    Not on file   Social History Narrative    Not on file     Review of Systems   Respiratory: Negative for shortness of breath.    Gastrointestinal: Positive for abdominal pain, diarrhea and nausea. Negative for vomiting.     Objective:     Vital Signs (Most Recent):  Temp: 97.6 °F (36.4 °C) (02/15/20 1222)  Pulse: 63 (02/15/20 1222)  Resp: 18 (02/15/20 1222)  BP: (!) 157/71 (02/15/20 1222)  SpO2: (!) 94 % (02/15/20 1149) Vital Signs (24h Range):  Temp:  [97.4 °F (36.3 °C)-97.8 °F (36.6 °C)] 97.6 °F (36.4 °C)  Pulse:  [60-65] 63  Resp:  [16-20] 18  SpO2:  [92 %-94 %] 94 %  BP: (130-162)/(60-71) 157/71     Weight: 75.2 kg (165 lb 12.6 oz)  Body mass index is 30.32 kg/m².    Estimated Creatinine Clearance: 24.6 mL/min (A) (based on SCr of 1.5 mg/dL (H)).    Physical Exam   HENT:   Hard of hearing   Cardiovascular:   Murmur heard.  2/6 GIULIANA at [facility]   Pulmonary/Chest: Breath sounds normal. No respiratory distress. She has no wheezes.   Abdominal: Soft. Bowel sounds are normal. There is tenderness.   RUQ scar well healed, tenderness to palpation in RUQ and mid-epigastrium.    Genitourinary:   Genitourinary Comments: No sacral decubitus lesions noted   Musculoskeletal: She exhibits no edema.   Neurological: She is alert.   Skin:   Several ecchymoses on upper thighs and arms and legs       Significant Labs:   Blood Culture:   Recent Labs   Lab 02/11/20 1945 02/11/20 1946 02/13/20  1040 02/13/20  1049   LABBLOO Gram stain aer bottle: Gram negative rods   Results called to and read back by: Katelin Ramachandran RN  02/12/2020    11:41  KLEBSIELLA PNEUMONIAE* No Growth to date  No  Growth to date  No Growth to date  No Growth to date No Growth to date  No Growth to date No Growth to date  No Growth to date     CBC:   Recent Labs   Lab 204 02/15/20  0453   WBC 7.45 5.32   HGB 8.9* 8.3*   HCT 29.2* 27.3*    281     CMP:   Recent Labs   Lab 20  0454 02/15/20  0452    137   K 4.0 3.1*    99   CO2 24 29   GLU 92 82   BUN 14 12   CREATININE 1.3 1.5*   CALCIUM 8.3* 8.0*   PROT  --  6.2   ALBUMIN  --  3.0*   BILITOT  --  0.3   ALKPHOS  --  70   AST  --  46*   ALT  --  55*   ANIONGAP 12 9   EGFRNONAA 37* 31*     Urine Studies:   Recent Labs   Lab 20  05   COLORU Yellow   APPEARANCEUA Clear   PHUR 6.0   SPECGRAV 1.010   PROTEINUA Negative   GLUCUA Negative   KETONESU Negative   BILIRUBINUA Negative   OCCULTUA Trace*   NITRITE Negative   UROBILINOGEN Negative   LEUKOCYTESUR Negative       Significant Imagin/13 ct abdomen and pelvis - Bilateral small pleural effusion. Mild prominence of the common bile duct, no definite obstructive changes seen, this can be seen post cholecystectomy, to correlate with liver enzymes. Cholecystectomy. Sigmoid diverticulosis. Extensive atherosclerotic plaque of the aorta and iliac vessels.  Age-indeterminate fracture at L1, L2, and L3, remote fracture of the S2 segment. Remote fracture of the right femoral neck region.     right knee x ray - There is subtle cortical irregularity with nonspecific linear lucency seen involving the medial femoral condyle.  Findings are unable to be confirmed on lateral and Merchant views and may be projectional, however difficult to completely exclude nondisplaced fracture if clinical history of recent trauma.  CT follow-up may be obtained if clinically indicated.    Otherwise no acute displaced fracture or dislocation seen.  Degenerative changes and joint space narrowing are seen involving the medial and lateral tibiofemoral compartments.  Patellofemoral compartment joint space appears  fairly well maintained.  No significant suprapatellar joint effusion.  Vascular calcifications are noted.    2/11 cxr - no acute process

## 2020-02-15 NOTE — ASSESSMENT & PLAN NOTE
Klebsiella pneumoniae bacteremia  Remains afebrile. CXR negative. UA negative. Influenza negative.   WBC count back to WNL.   Repeat blood cultures are negative.   Stop vancomycin.   Switch zosyn to cefazolin 2 gram IV BID.   CT abd/pelvis did not really show a source either.   LFTs are mildly elevated. Will trend and check GGT.   Consult ID for assistance with evaluation and decision on antibiotics.

## 2020-02-15 NOTE — PROGRESS NOTES
[facility]  Progress Note    Patient Name: Katherine Hernandez  MRN: [id number]  Patient Class: IP- Inpatient   Admission Date: 2/11/2020  Length of Stay: 2 days  Attending Physician: Yoon Calvin*  Primary Care Provider: Israel Batista MD        Subjective:     Principal Problem:Sepsis due to Klebsiella pneumoniae        HPI:  Katherine Hernandez is an 89 yo female with HTN, HLD, chronic diastolic heart failure, paroxysmal atrial fibrillation, long tern anticoagulation use, carotid artery disease, chronic kidney disease stage 3, anemia of chronic disease, tachy-tejal syndrome, s/p pacemaker placement, chronic neck pain, chronic back pain and depression who presented to [facility] ED 2/11/20 with complaint of generalized body aches associated with chest pain, abdominal pain and n/v x 1. Family at bedside also reports intermittent dry nonproductive cough. No hematemesis or melena. Pt with low grade fever (Tmax 100.2) in ED. Initial labs remarkable for H/H 9.5/31, BUN/Cr 27/1.6 (previously 22/1.3). Troponin negative. No acute changes on telemetry. Influenza negative. CXR negative. She received Vancomycin/Zosyn and 2 liters normal saline. Patient admitted to [facility] [place].     Overview/Hospital Course:  Blood cultures collected 2/11 positive for gram negative rods. Repeat cultures 2/13 pending. Pt afebrile. She reports lower abdominal/pelvic pain on exam. Nausea/vomiting resolved. CT abdomen did not really show any acute findings that could be source of bacteremia. Blood cultures resulted and pan-sensitive Klebsiella pneumoniae, so zosyn was transitioned to cefazolin.     Interval History: Not eating much. Still with some abdominal pain and nausea. No vomiting.     Review of Systems   Constitutional: Negative for chills and fever.   Respiratory: Negative for cough and shortness of breath.    Cardiovascular: Negative for chest pain and palpitations.    Gastrointestinal: Positive for nausea. Negative for vomiting.     Objective:     Vital Signs (Most Recent):  Temp: 97.6 °F (36.4 °C) (02/14/20 1152)  Pulse: 60 (02/14/20 1556)  Resp: 18 (02/14/20 1152)  BP: 135/74 (02/14/20 1330)  SpO2: 96 % (02/14/20 1500) Vital Signs (24h Range):  Temp:  [97.6 °F (36.4 °C)-98.5 °F (36.9 °C)] 97.6 °F (36.4 °C)  Pulse:  [60-75] 60  Resp:  [16-20] 18  SpO2:  [93 %-96 %] 96 %  BP: (135-182)/(65-82) 135/74     Weight: 75.6 kg (166 lb 10.7 oz)  Body mass index is 30.48 kg/m².    Intake/Output Summary (Last 24 hours) at 2/14/2020 1807  Last data filed at 2/14/2020 1245  Gross per 24 hour   Intake 655 ml   Output 706 ml   Net -51 ml      Physical Exam   Constitutional: She is oriented to person, place, and time. She appears well-developed and well-nourished. No distress.   Cardiovascular: Normal rate and regular rhythm.   No murmur heard.  Pulmonary/Chest: Effort normal and breath sounds normal. No respiratory distress. She has no wheezes.   Abdominal: Soft. Bowel sounds are normal. She exhibits no distension. There is tenderness in the right upper quadrant and epigastric area.   Musculoskeletal: She exhibits no edema.   Neurological: She is alert and oriented to person, place, and time.   Skin: Skin is warm and dry.   Psychiatric: She has a normal mood and affect. Her behavior is normal.   Nursing note and vitals reviewed.      Significant Labs:   Blood Culture:   Recent Labs   Lab 02/13/20  1040 02/13/20  1049   LABBLOO No Growth to date  No Growth to date No Growth to date  No Growth to date     CBC:   Recent Labs   Lab 02/13/20  0529 02/14/20  0454   WBC 9.69 7.45   HGB 9.1* 8.9*   HCT 31.0* 29.2*    259     CMP:   Recent Labs   Lab 02/14/20  0454      K 4.0      CO2 24   GLU 92   BUN 14   CREATININE 1.3   CALCIUM 8.3*   ANIONGAP 12   EGFRNONAA 37*     Magnesium:   Recent Labs   Lab 02/13/20  0529 02/14/20  0454   MG 2.4 2.2     POCT Glucose:   Recent Labs   Lab  02/14/20  1149 02/14/20  1612   POCTGLUCOSE 107 103       Significant Imaging: I have reviewed all pertinent imaging results/findings within the past 24 hours.      Assessment/Plan:      * Sepsis due to Klebsiella pneumoniae  Klebsiella pneumoniae bacteremia  Remains afebrile. CXR negative. UA negative. Influenza negative.   WBC count back to WNL.   Repeat blood cultures are negative.   Stop vancomycin.   Switch zosyn to cefazolin 2 gram IV BID.   CT abd/pelvis did not really show a source either.   LFTs are mildly elevated. Will trend and check GGT.   Consult ID for assistance with evaluation and decision on antibiotics.     Acute kidney injury superimposed on chronic kidney disease  CKD (chronic kidney disease) stage 3, GFR 30-59 ml/min    Creatinine stable at her baseline Cr 1.3.   Avoid nephrotoxic meds   Continue to monitor off IV fluids, since has poor po intake still.     GERD (gastroesophageal reflux disease)  Continue PPI       Anemia of chronic disease  H/H stable, monitor       Depression  Continue effexor       Essential hypertension  Hyperlipidemia   Paroxysmal atrial fibrillation  Chronic anticoagulation  CAD (coronary artery disease)  Chronic diastolic heart failure  Tachy-tejal syndrome s/p pacemaker placement     SBP ranged 140 to 186.   Continue amiodarone, apixaban, furosemide and metoprolol   Continue statin  Telemetry monitoring.       VTE Risk Mitigation (From admission, onward)         Ordered     apixaban tablet 5 mg  2 times daily      02/12/20 0956     Place JENNIFER hose  Until discontinued      02/11/20 0139                      Ishan Lora MD  Department of Hospital Medicine   [facility]

## 2020-02-16 PROBLEM — R10.11 RUQ ABDOMINAL PAIN: Status: ACTIVE | Noted: 2020-02-16

## 2020-02-16 LAB
ALBUMIN SERPL BCP-MCNC: 2.9 G/DL (ref 3.5–5.2)
ALP SERPL-CCNC: 68 U/L (ref 55–135)
ALT SERPL W/O P-5'-P-CCNC: 24 U/L (ref 10–44)
ANION GAP SERPL CALC-SCNC: 10 MMOL/L (ref 8–16)
AST SERPL-CCNC: 36 U/L (ref 10–40)
BACTERIA BLD CULT: NORMAL
BASOPHILS # BLD AUTO: 0.02 K/UL (ref 0–0.2)
BASOPHILS NFR BLD: 0.4 % (ref 0–1.9)
BILIRUB DIRECT SERPL-MCNC: 0.2 MG/DL (ref 0.1–0.3)
BILIRUB SERPL-MCNC: 0.3 MG/DL (ref 0.1–1)
BUN SERPL-MCNC: 10 MG/DL (ref 8–23)
CALCIUM SERPL-MCNC: 8 MG/DL (ref 8.7–10.5)
CHLORIDE SERPL-SCNC: 99 MMOL/L (ref 95–110)
CO2 SERPL-SCNC: 29 MMOL/L (ref 23–29)
CREAT SERPL-MCNC: 1.4 MG/DL (ref 0.5–1.4)
DIFFERENTIAL METHOD: ABNORMAL
EOSINOPHIL # BLD AUTO: 0.3 K/UL (ref 0–0.5)
EOSINOPHIL NFR BLD: 5.2 % (ref 0–8)
ERYTHROCYTE [DISTWIDTH] IN BLOOD BY AUTOMATED COUNT: 15.4 % (ref 11.5–14.5)
EST. GFR  (AFRICAN AMERICAN): 39 ML/MIN/1.73 M^2
EST. GFR  (NON AFRICAN AMERICAN): 34 ML/MIN/1.73 M^2
GLUCOSE SERPL-MCNC: 81 MG/DL (ref 70–110)
HCT VFR BLD AUTO: 27.3 % (ref 37–48.5)
HGB BLD-MCNC: 8.5 G/DL (ref 12–16)
IMM GRANULOCYTES # BLD AUTO: 0.08 K/UL (ref 0–0.04)
IMM GRANULOCYTES NFR BLD AUTO: 1.4 % (ref 0–0.5)
LYMPHOCYTES # BLD AUTO: 0.9 K/UL (ref 1–4.8)
LYMPHOCYTES NFR BLD: 15.6 % (ref 18–48)
MAGNESIUM SERPL-MCNC: 2 MG/DL (ref 1.6–2.6)
MCH RBC QN AUTO: 30.9 PG (ref 27–31)
MCHC RBC AUTO-ENTMCNC: 31.1 G/DL (ref 32–36)
MCV RBC AUTO: 99 FL (ref 82–98)
MONOCYTES # BLD AUTO: 0.7 K/UL (ref 0.3–1)
MONOCYTES NFR BLD: 12.1 % (ref 4–15)
NEUTROPHILS # BLD AUTO: 3.6 K/UL (ref 1.8–7.7)
NEUTROPHILS NFR BLD: 65.3 % (ref 38–73)
NRBC BLD-RTO: 0 /100 WBC
PHOSPHATE SERPL-MCNC: 3.5 MG/DL (ref 2.7–4.5)
PLATELET # BLD AUTO: 264 K/UL (ref 150–350)
PMV BLD AUTO: 9.7 FL (ref 9.2–12.9)
POTASSIUM SERPL-SCNC: 2.8 MMOL/L (ref 3.5–5.1)
PROT SERPL-MCNC: 6 G/DL (ref 6–8.4)
RBC # BLD AUTO: 2.75 M/UL (ref 4–5.4)
SODIUM SERPL-SCNC: 138 MMOL/L (ref 136–145)
WBC # BLD AUTO: 5.53 K/UL (ref 3.9–12.7)

## 2020-02-16 PROCEDURE — 80048 BASIC METABOLIC PNL TOTAL CA: CPT | Mod: HCNC

## 2020-02-16 PROCEDURE — 99223 1ST HOSP IP/OBS HIGH 75: CPT | Mod: HCNC,,, | Performed by: INTERNAL MEDICINE

## 2020-02-16 PROCEDURE — 25000003 PHARM REV CODE 250: Mod: HCNC | Performed by: HOSPITALIST

## 2020-02-16 PROCEDURE — 36415 COLL VENOUS BLD VENIPUNCTURE: CPT | Mod: HCNC

## 2020-02-16 PROCEDURE — 25000003 PHARM REV CODE 250: Mod: HCNC | Performed by: NURSE PRACTITIONER

## 2020-02-16 PROCEDURE — 92610 EVALUATE SWALLOWING FUNCTION: CPT | Mod: HCNC

## 2020-02-16 PROCEDURE — 94761 N-INVAS EAR/PLS OXIMETRY MLT: CPT | Mod: HCNC

## 2020-02-16 PROCEDURE — 99223 PR INITIAL HOSPITAL CARE,LEVL III: ICD-10-PCS | Mod: HCNC,,, | Performed by: INTERNAL MEDICINE

## 2020-02-16 PROCEDURE — 84100 ASSAY OF PHOSPHORUS: CPT | Mod: HCNC

## 2020-02-16 PROCEDURE — 21400001 HC TELEMETRY ROOM: Mod: HCNC

## 2020-02-16 PROCEDURE — 63600175 PHARM REV CODE 636 W HCPCS: Mod: HCNC | Performed by: HOSPITALIST

## 2020-02-16 PROCEDURE — 85025 COMPLETE CBC W/AUTO DIFF WBC: CPT | Mod: HCNC

## 2020-02-16 PROCEDURE — 80076 HEPATIC FUNCTION PANEL: CPT | Mod: HCNC

## 2020-02-16 PROCEDURE — 83735 ASSAY OF MAGNESIUM: CPT | Mod: HCNC

## 2020-02-16 RX ADMIN — CEFAZOLIN SODIUM 2 G: 2 SOLUTION INTRAVENOUS at 09:02

## 2020-02-16 RX ADMIN — ROSUVASTATIN CALCIUM 10 MG: 10 TABLET, COATED ORAL at 09:02

## 2020-02-16 RX ADMIN — VENLAFAXINE HYDROCHLORIDE 37.5 MG: 37.5 CAPSULE, EXTENDED RELEASE ORAL at 08:02

## 2020-02-16 RX ADMIN — TRAMADOL HYDROCHLORIDE 50 MG: 50 TABLET, FILM COATED ORAL at 12:02

## 2020-02-16 RX ADMIN — OXYCODONE HYDROCHLORIDE AND ACETAMINOPHEN 1 TABLET: 5; 325 TABLET ORAL at 08:02

## 2020-02-16 RX ADMIN — Medication 6 MG: at 09:02

## 2020-02-16 RX ADMIN — OXYCODONE HYDROCHLORIDE AND ACETAMINOPHEN 1 TABLET: 5; 325 TABLET ORAL at 04:02

## 2020-02-16 RX ADMIN — TRAMADOL HYDROCHLORIDE 50 MG: 50 TABLET, FILM COATED ORAL at 09:02

## 2020-02-16 RX ADMIN — PANTOPRAZOLE SODIUM 40 MG: 40 TABLET, DELAYED RELEASE ORAL at 08:02

## 2020-02-16 RX ADMIN — THERA TABS 1 TABLET: TAB at 08:02

## 2020-02-16 RX ADMIN — AMIODARONE HYDROCHLORIDE 200 MG: 200 TABLET ORAL at 08:02

## 2020-02-16 RX ADMIN — LEVOTHYROXINE SODIUM 100 MCG: 100 TABLET ORAL at 05:02

## 2020-02-16 RX ADMIN — APIXABAN 5 MG: 2.5 TABLET, FILM COATED ORAL at 08:02

## 2020-02-16 RX ADMIN — STANDARDIZED SENNA CONCENTRATE AND DOCUSATE SODIUM 1 TABLET: 8.6; 5 TABLET ORAL at 09:02

## 2020-02-16 RX ADMIN — METOPROLOL TARTRATE 25 MG: 25 TABLET ORAL at 09:02

## 2020-02-16 RX ADMIN — METOPROLOL TARTRATE 25 MG: 25 TABLET ORAL at 08:02

## 2020-02-16 NOTE — PLAN OF CARE
Problem: Fall Injury Risk  Goal: Absence of Fall and Fall-Related Injury  Outcome: Ongoing, Progressing     Problem: Adult Inpatient Plan of Care  Goal: Plan of Care Review  Outcome: Ongoing, Progressing  Goal: Patient-Specific Goal (Individualization)  Outcome: Ongoing, Progressing  Goal: Absence of Hospital-Acquired Illness or Injury  Outcome: Ongoing, Progressing  Goal: Optimal Comfort and Wellbeing  Outcome: Ongoing, Progressing  Goal: Readiness for Transition of Care  Outcome: Ongoing, Progressing  Goal: Rounds/Family Conference  Outcome: Ongoing, Progressing     Problem: Adjustment to Illness (Sepsis/Septic Shock)  Goal: Optimal Coping  Outcome: Ongoing, Progressing   Pt awake and alert. Oriented x 3. VSS. Nad. Tolerating dysphagia soft diet. Pt incontinent of bowel and bladder. No complaints of pain/nausea. Daughter at bedside. Safety maintained.

## 2020-02-16 NOTE — ASSESSMENT & PLAN NOTE
Klebsiella pneumoniae bacteremia  Remains afebrile. CXR negative. UA negative. Influenza negative.   WBC count back to WNL.   Repeat blood cultures are negative.   CT abd/pelvis did not really show a source either.   LFTs are mildly elevated  Continue cefazolin 2 gram IV BID  ID consulted - recommended 2D echo and possible GI consult

## 2020-02-16 NOTE — PROGRESS NOTES
[facility]  Infectious Disease  Progress Note    Patient Name: Katherine Hernandez  MRN: [id number]  Admission Date: 2/11/2020  Length of Stay: 4 days  Attending Physician: Brad Butler DO  Primary Care Provider: Israel Batista MD    Isolation Status: No active isolations  Assessment/Plan:      * Sepsis due to Klebsiella pneumoniae  87 y/o female with HTN, HLD, CHF, a fib, Carotid artery disease, CKD3, anemia, tachy tejal, s/p pacemaker, chronic neck pain and back pain presented to [facility] 2/11 with body aches, nausea vomiting and diarrhea with low grade fever 100.2 in ED. Was admitted and BC on admit positive for klebsiella pneumoniae sensitive to all. Patient was originally on vanco and zosyn and was streamlined to cefazolin on 2/14. Patient with abdominal pain. Ct abdomen and pelvis - s/p cholecystectomy and slight dilation of common bile duct, sigmoid diverticulosis, bilateral small pleural effusions.     Rec:  Continue cefazolin 2 grams IVPB every 12 h as is for now (cr cl around 24.6)  Recommend echocardiogram rule out endocarditis -patient has a heart murmur.   GI evaluation in progress  Not clear on source of the klebsiella bacteremia at this point  Watch LTFs, improved over yesterday  Watch diarrhea - may need workup if it persists              Anticipated Disposition: unclear at this point    Thank you for your consult. I will follow-up with patient. Please contact us if you have any additional questions.[phone]    Zoila Crews MD  Infectious Disease  [facility]    Subjective:     Principal Problem:Sepsis due to Klebsiella pneumoniae    HPI: 87 y/o female with HTN, HLD, CHF, a fib, long term anticoagulant use, CAD, CKD3, anemia of chronic disease, tachy tejal syndrome, Pacemaker placement, chronic neck pain, chronic back pain, depression; patient admitted to [facility] on 2/11 with chest pain, back pain, bilateral shoulder pain, nausea and vomiting X 1. Had dry cough, low  grade fever in the ED to 100.2. She was admitted and started on vanco and zosyn empirically. BC on 2/11 one of 2 sets positive for GNR - klebsiella pneumoniae sensitive to all. Flu swab on admit negative; Repeat BC on 2/13 NGTD. UA on admit 2/11 was negative except for trace protein. UA on 2/14 was negative except for trace blood. Portable CXR on 2/11 was negative for acute process. Right knee x ray 2/12 positive for subtle cortical irregularity with nonspecific linear lucency seen involving the medial femoral condyle, degenerative changes and no significant joint effusion seen. CT abdomen and pelvis done on 2/14 positive for bilateral small pleural effusions, s/p cholecystectomy, mild prominence of the common bile duct no definite obstruction, sigmoid diverticulosis, some fractures of L1, L2 and L3 (age indeterminate) remote fracture S2 and right femoral neck. 2/14 the vanco and zosyn were changed to cefazolin. ID consulted 2/15 for help with antibiotics and to figure out source for the klebsiella bacteremia.     2/15 Patient is hard of hearing - daughter in room can provide most information. She became sick all of a sudden - daughter states that patient was in her PCP office earlier this week and was not having any problems. Patient had cholecystectomy years ago. Had right hip surgery and pacemaker placement without problems. Patient complaining of diarrhea. Also of nausea and abdominal pain - epigastric and RUQ pain. No complaints of SOB.     2/16 still with abdominal pain today some nausea yesterday; GI evaluation in progress  Interval History: Patient had nausea yesterday and still with abdominal pain today. GI workup in progress. Planned endoscopy tomorrow.     Review of Systems   Gastrointestinal: Positive for abdominal pain and nausea. Negative for vomiting.     Antibiotics (From admission, onward)    Start     Stop Route Frequency Ordered    02/14/20 1915  cefazolin (ANCEF) 2 gram in dextrose 5% 50 mL IVPB  (premix)      -- IV Every 12 hours (non-standard times) 02/14/20 1800        Objective:     Vital Signs (Most Recent):  Temp: 97.6 °F (36.4 °C) (02/16/20 1540)  Pulse: 60 (02/16/20 1552)  Resp: 20 (02/16/20 1540)  BP: (!) 152/70 (02/16/20 1540)  SpO2: (!) 94 % (02/16/20 1154) Vital Signs (24h Range):  Temp:  [97.6 °F (36.4 °C)-98.4 °F (36.9 °C)] 97.6 °F (36.4 °C)  Pulse:  [60-66] 60  Resp:  [17-20] 20  SpO2:  [91 %-94 %] 94 %  BP: (141-193)/(64-81) 152/70     Weight: 76 kg (167 lb 8.8 oz)  Body mass index is 30.65 kg/m².    Estimated Creatinine Clearance: 26.5 mL/min (based on SCr of 1.4 mg/dL).    Physical Exam   HENT:   Hard of hearing   Cardiovascular:   Murmur heard.  2/6 systolic murmur left sternal border   Pulmonary/Chest: Breath sounds normal.   Abdominal: Bowel sounds are normal. She exhibits no distension. There is tenderness.   Some tenderness RUQ and RLQ   Musculoskeletal: She exhibits no edema.   Neurological: She is alert.   Skin:   Ecchymoses on thighs and arms, very thin skin       Significant Labs:   Blood Culture:   Recent Labs   Lab 02/11/20  1945 02/11/20  1946 02/13/20  1040 02/13/20  1049   LABBLOO Gram stain aer bottle: Gram negative rods   Results called to and read back by: Katelin Ramachandran RN  02/12/2020    11:41  KLEBSIELLA PNEUMONIAE* No Growth to date  No Growth to date  No Growth to date  No Growth to date  No Growth to date No Growth to date  No Growth to date  No Growth to date  No Growth to date No Growth to date  No Growth to date  No Growth to date  No Growth to date     CBC:   Recent Labs   Lab 02/15/20  0453 02/16/20  0549   WBC 5.32 5.53   HGB 8.3* 8.5*   HCT 27.3* 27.3*    264     CMP:   Recent Labs   Lab 02/15/20  0452 02/16/20  0549    138   K 3.1* 2.8*   CL 99 99   CO2 29 29   GLU 82 81   BUN 12 10   CREATININE 1.5* 1.4   CALCIUM 8.0* 8.0*   PROT 6.2 6.0   ALBUMIN 3.0* 2.9*   BILITOT 0.3 0.3   ALKPHOS 70 68   AST 46* 36   ALT 55* 24   ANIONGAP 9 10    EGFRNONAA 31* 34*     Urine Studies:   Recent Labs   Lab 02/14/20  0526   COLORU Yellow   APPEARANCEUA Clear   PHUR 6.0   SPECGRAV 1.010   PROTEINUA Negative   GLUCUA Negative   KETONESU Negative   BILIRUBINUA Negative   OCCULTUA Trace*   NITRITE Negative   UROBILINOGEN Negative   LEUKOCYTESUR Negative       Significant Imaging: no new imaging since 2/13

## 2020-02-16 NOTE — ASSESSMENT & PLAN NOTE
Hyperlipidemia   Paroxysmal atrial fibrillation  Chronic anticoagulation  CAD (coronary artery disease)  Chronic diastolic heart failure  Tachy-tejal syndrome s/p pacemaker placement     Continue amiodarone, apixaban, furosemide and metoprolol   Continue statin  Telemetry monitoring.

## 2020-02-16 NOTE — PLAN OF CARE
Patient is awake and alert.  Hard of hearing.  Daughter at bedside.  Receiving antibiotics.  Has complaints of pain and pain medications given with relief.  Incontinent of bowel and bladder and wears the diapers.  Safety is maintained with bed low, wheels locked and side rails up.  Call light within reach.  Bed alarm on.  Will continue to monitor.

## 2020-02-16 NOTE — PLAN OF CARE
VN and patient discussed todays events and future plan of care.  Patient verifies having a BM today.  She is working with PT and is aware of the plan to go home with Home health.  Safety measures maintained including bed locked and in lowest position with alarm on.  Call light within reach and patient verbalizes an understanding on how and why to use it.  Daughter at bedside.

## 2020-02-16 NOTE — ASSESSMENT & PLAN NOTE
CKD (chronic kidney disease) stage 3, GFR 30-59 ml/min    Creatinine stable at her baseline Cr 1.34  Avoid nephrotoxic meds   Continue to monitor off IV fluids, since has poor po intake still.

## 2020-02-16 NOTE — PLAN OF CARE
Patient is awake and alert.  Patient has complaints of pain and pain medications given with moderate relief.  Daughter at bedside.  Patient is incontinent of bowel and bladder and diaper is in use.  Continues to receive antibiotics.  Safety is maintained with bed low, wheels locked and side rails up.  Call light within reach.  Bed alarm on.  Will continue to monitor.

## 2020-02-16 NOTE — PT/OT/SLP EVAL
Speech Language Pathology Evaluation & Discharge Note  Bedside Swallow Study    Patient Name:  Katherine Hernandez   MRN:  [id number]  Admitting Diagnosis: Sepsis due to Klebsiella pneumoniae    Recommendations:                 General Recommendations:  Follow-up not indicated  Diet recommendations:  Mechanical soft, Thin   Aspiration Precautions: 1 bite/sip at a time, Alternating bites/sips, Assistance with meals, Double swallow with each bite/sip, HOB to 90 degrees, Meds crushed in puree, Monitor for s/s of aspiration, Remain upright 30 minutes post meal, Small bites/sips and Standard aspiration precautions   General Precautions: Standard, aspiration, fall, hearing impaired  Communication strategies:  Speak loud/clear & remove background noise    History:     Chief Complaint   Patient presents with    Generalized Body Aches       pt c/o midsternal chest pain, back pain, and bilateral shoulder pain since 1600 today. Also c/o chronic right leg pain. Pt took 162mg aspirin at home prior to arrival of EMS, and then vomited once and was incontinent of stool en route. Pt spitting, c/o nausea and body aches on arrival. Low grade temp noted         HPI: Katherine Hernandez is an 89 yo female with HTN, HLD, chronic diastolic heart failure, paroxysmal atrial fibrillation, long tern anticoagulation use, carotid artery disease, chronic kidney disease stage 3, anemia of chronic disease, tachy-tejal syndrome, s/p pacemaker placement, chronic neck pain, chronic back pain and depression who presented to [facility] ED [facility]/11/20 with complaint of generalized body aches associated with chest pain, abdominal pain and n/v x 1. Family at bedside also reports intermittent dry nonproductive cough. No hematemesis or melena. Pt with low grade fever (Tmax 100.2) in ED. Initial labs remarkable for H/H 9.5/31, BUN/Cr 27/1.6 (previously 22/1.3). Troponin negative. No acute changes on telemetry. Influenza negative. CXR negative. She received  Vancomycin/Zosyn and 2 liters normal saline. Patient admitted to [facility].      Past Medical History:   Diagnosis Date    Anticoagulant long-term use     Anxiety     Arthritis     Bronchitis 11/13/2014    CAD (coronary artery disease)     Cancer     Chronic back pain     Chronic diastolic CHF (congestive heart failure)     Encounter for blood transfusion     GERD (gastroesophageal reflux disease)     Hyperlipidemia     Hypertension     Pacemaker     Paroxysmal A-fib     Thyroid disease        Past Surgical History:   Procedure Laterality Date    CARDIAC SURGERY      pacemaker placement    CERVICAL SPINE SURGERY      CHOLECYSTECTOMY      HIP SURGERY       Prior Intubation HX:  None this admit.     Modified Barium Swallow: MBSS completed 04/18/2018 which revealed:   Katherine Hernandez, an 86 y.o. female, appears to present with Oropharyngeal Dysphagia (R13.12), characterized by reduced lingual coordination, reduced A-P lingual coordination, increased oral transit time, reduced tongue base posterior movement, reduced hyolaryngeal excursion, and reduced laryngeal closure, resulting in the above noted inefficiencies, including penetration to level of vocal cords x1 on first presentation of thin liquids via teaspoon and flash penetration with large, consecutive sips of nectar thick liquids. Although no aspiration was observed during the limited nature of today's study, risk for aspiration may not be completely eliminated due to patient's noted inefficiencies; however, following strict swallowing precautions and utilizing compensatory strategies, swallow function is judged to be safe enough for trial diet of dental soft diet with thin liquids at this time while monitoring closely for s/s of aspiration. If patient is noted to have difficulty with thin liquids, diet to be downgraded back to dental soft with nectar thick liquids with Free Water Protocol (FWP) previously in place.  Speech therapy is not warranted to address dysphagia at this time. Continue oral/laryngeal exercises per Home Health ST and continue to monitor closely for s/s of aspiration. Inform MD/PCP if swallow function is noted to change or decline. A repeat MBSS may be warranted.     Chest X-Rays:   Impression:       No acute cardiopulmonary process identified.     Prior diet: Soft solids/chopped meats and Thin liquids.    Subjective     Pt seen at the bedside for clinical swallow assessment. SLP did check w/ RN, Kari, prior to visit. Pt asleep upon entry with daughter,Anca, at the bedside. Dgtr agreed to SLP waking pt. Pt awakened to loud verbal stimuli. Pt pleasant and agreeable to PO trials.  Patient goals: Pt selected milk for liquid trial.      Pain/Comfort:  · Pain Rating 1: 0/10    Objective:     Oral Musculature Evaluation  · Oral Musculature: WFL  · Dentition: other (see comments)(lower central dentition only)  · Secretion Management: adequate  · Mucosal Quality: good  · Mandibular Strength and Mobility: WFL  · Oral Labial Strength and Mobility: WFL  · Lingual Strength and Mobility: impaired strength(bilateral bruising on anterior tongue)  · Velar Elevation: WFL  · Buccal Strength and Mobility: WFL  · Volitional Cough: (elicited)  · Volitional Swallow: (timely)  · Voice Prior to PO Intake: (clear)    Bedside Swallow Eval: HOB elevated to 75*--pain level inhibited bed from being elevated higher. Pt very Tribal, thus SLP provided loud verbal cues and provided repetitions as needed. Min A provided with feeding.   Consistencies Assessed:  · Thin liquids : straw sips milk x3  · Puree : tsp bite pudding x2  · Soft solids : tsp bite diced peaches x3     Oral Phase:   · WFL  · Prolonged mastication  · Oral residue: cleared ind'ly with double swallow    Pharyngeal Phase:   · no overt clinical signs/symptoms of aspiration  · no overt clinical signs/symptoms of pharyngeal dysphagia  · multiple spontaneous swallows   · Adequate  hyolaryngeal lift/excursion  · Timely swallow initiation     Compensatory Strategies  · Multiple swallows    Treatment: Pt at baseline swallow function. No further SLP services needed at this time.     Assessment:     Katherine Hernandez is a 88 y.o. female with an SLP diagnosis of mild Dysphagia to hard solids and pills.  She presents with baseline avoidance of hard solids 2/2 lack of dentition. Pt recently experienced difficulty consuming pills whole though sufficiently tolerating meds crushed in puree. Pt's oral and pharyngeal phases of the swallow deemed WFL to continue on a Mechanical Soft/Thin liquid diet. Pt to follow all universal aspiration and GERD precautions. Meds may be administered crushed in puree as needed. No further SLP tx warranted at this time. Please re-consult should change in status occur.     Goals:   Multidisciplinary Problems     SLP Goals     Not on file          Multidisciplinary Problems (Resolved)        Problem: SLP Goal    Goal Priority Disciplines Outcome   SLP Goal   (Resolved)     SLP Met   Description:  STGs:  1. Pt will participate in clinical swallow assessment to determine LRD.-Met 2/16/2020  2. Pt will consume Mechanical Soft/Thin liquid diet without any overt s/s of aspiration. -Met 2/16/2020                      Plan:     · Patient to be seen:  (n/a)   · Plan of Care expires:  02/16/20  · Plan of Care reviewed with:  patient, daughter(RN)   · SLP Follow-Up:  No       Discharge recommendations:  home   Barriers to Discharge:  None    Time Tracking:     SLP Treatment Date:   02/16/20  Speech Start Time:  0946  Speech Stop Time:  0957     Speech Total Time (min):  11 min    Billable Minutes: Eval Swallow and Oral Function 11 mins    Stacey Escalera CCC-SLP  02/16/2020

## 2020-02-16 NOTE — SUBJECTIVE & OBJECTIVE
Interval History: Patient continues to have RUQ abd pain and some nausea. Appetite decreased. Still having loose stools. Denies fever/chills. Repeat blood cultures NGTD    Review of Systems   Constitutional: Negative for chills and fever.   Respiratory: Negative for cough and shortness of breath.    Cardiovascular: Negative for chest pain and palpitations.   Gastrointestinal: Positive for abdominal pain and nausea. Negative for vomiting.   Genitourinary: Negative for dysuria.   Musculoskeletal: Positive for back pain.   Neurological: Positive for weakness.     Objective:     Vital Signs (Most Recent):  Temp: 97.7 °F (36.5 °C) (02/16/20 1205)  Pulse: 60 (02/16/20 1420)  Resp: 20 (02/16/20 1205)  BP: (!) 141/64 (02/16/20 1420)  SpO2: (!) 94 % (02/16/20 1154) Vital Signs (24h Range):  Temp:  [97.7 °F (36.5 °C)-98.4 °F (36.9 °C)] 97.7 °F (36.5 °C)  Pulse:  [60-66] 60  Resp:  [17-20] 20  SpO2:  [91 %-94 %] 94 %  BP: (141-193)/(64-81) 141/64     Weight: 76 kg (167 lb 8.8 oz)  Body mass index is 30.65 kg/m².    Intake/Output Summary (Last 24 hours) at 2/16/2020 1505  Last data filed at 2/16/2020 0632  Gross per 24 hour   Intake 460 ml   Output 1500 ml   Net -1040 ml      Physical Exam   Constitutional: She is oriented to person, place, and time. She appears well-developed and well-nourished. No distress.   Cardiovascular: Normal rate and regular rhythm.   No murmur heard.  Pulmonary/Chest: Effort normal and breath sounds normal. No respiratory distress. She has no wheezes.   Abdominal: Soft. Bowel sounds are normal. She exhibits no distension. There is tenderness in the right upper quadrant and epigastric area.   Musculoskeletal: She exhibits no edema.   Neurological: She is alert and oriented to person, place, and time.   Skin: Skin is warm and dry.   Psychiatric: She has a normal mood and affect. Her behavior is normal.   Nursing note and vitals reviewed.      Significant Labs: All pertinent labs within the past 24 hours  have been reviewed.    Significant Imaging: I have reviewed all pertinent imaging results/findings within the past 24 hours.

## 2020-02-16 NOTE — PROGRESS NOTES
[facility]  Progress Note    Patient Name: Katherine Hernandez  MRN: [id number]  Patient Class: IP- Inpatient   Admission Date: 2/11/2020  Length of Stay: 4 days  Attending Physician: Brad Butler DO  Primary Care Provider: Israel Batista MD        Subjective:     Principal Problem:Sepsis due to Klebsiella pneumoniae        HPI:  Katherine Hernandez is an 87 yo female with HTN, HLD, chronic diastolic heart failure, paroxysmal atrial fibrillation, long tern anticoagulation use, carotid artery disease, chronic kidney disease stage 3, anemia of chronic disease, tachy-tejal syndrome, s/p pacemaker placement, chronic neck pain, chronic back pain and depression who presented to [facility] ED 2/11/20 with complaint of generalized body aches associated with chest pain, abdominal pain and n/v x 1. Family at bedside also reports intermittent dry nonproductive cough. No hematemesis or melena. Pt with low grade fever (Tmax 100.2) in ED. Initial labs remarkable for H/H 9.5/31, BUN/Cr 27/1.6 (previously 22/1.3). Troponin negative. No acute changes on telemetry. Influenza negative. CXR negative. She received Vancomycin/Zosyn and 2 liters normal saline. Patient admitted to [facility].     Overview/Hospital Course:  Blood cultures collected 2/11 positive for gram negative rods. Repeat cultures 2/13 pending. Pt afebrile. She reports lower abdominal/pelvic pain on exam. Nausea/vomiting resolved. CT abdomen did not really show any acute findings that could be source of bacteremia. Blood cultures resulted and pan-sensitive Klebsiella pneumoniae, so zosyn was transitioned to cefazolin. ID consulted and recommended GI consult due to persistent RUQ abdominal pain     Interval History: Patient continues to have RUQ abd pain and some nausea. Appetite decreased. Still having loose stools. Denies fever/chills. Repeat blood cultures NGTD    Review of Systems   Constitutional: Negative  for chills and fever.   Respiratory: Negative for cough and shortness of breath.    Cardiovascular: Negative for chest pain and palpitations.   Gastrointestinal: Positive for abdominal pain and nausea. Negative for vomiting.   Genitourinary: Negative for dysuria.   Musculoskeletal: Positive for back pain.   Neurological: Positive for weakness.     Objective:     Vital Signs (Most Recent):  Temp: 97.7 °F (36.5 °C) (02/16/20 1205)  Pulse: 60 (02/16/20 1420)  Resp: 20 (02/16/20 1205)  BP: (!) 141/64 (02/16/20 1420)  SpO2: (!) 94 % (02/16/20 1154) Vital Signs (24h Range):  Temp:  [97.7 °F (36.5 °C)-98.4 °F (36.9 °C)] 97.7 °F (36.5 °C)  Pulse:  [60-66] 60  Resp:  [17-20] 20  SpO2:  [91 %-94 %] 94 %  BP: (141-193)/(64-81) 141/64     Weight: 76 kg (167 lb 8.8 oz)  Body mass index is 30.65 kg/m².    Intake/Output Summary (Last 24 hours) at 2/16/2020 1505  Last data filed at 2/16/2020 0632  Gross per 24 hour   Intake 460 ml   Output 1500 ml   Net -1040 ml      Physical Exam   Constitutional: She is oriented to person, place, and time. She appears well-developed and well-nourished. No distress.   Cardiovascular: Normal rate and regular rhythm.   No murmur heard.  Pulmonary/Chest: Effort normal and breath sounds normal. No respiratory distress. She has no wheezes.   Abdominal: Soft. Bowel sounds are normal. She exhibits no distension. There is tenderness in the right upper quadrant and epigastric area.   Musculoskeletal: She exhibits no edema.   Neurological: She is alert and oriented to person, place, and time.   Skin: Skin is warm and dry.   Psychiatric: She has a normal mood and affect. Her behavior is normal.   Nursing note and vitals reviewed.      Significant Labs: All pertinent labs within the past 24 hours have been reviewed.    Significant Imaging: I have reviewed all pertinent imaging results/findings within the past 24 hours.      Assessment/Plan:      * Sepsis due to Klebsiella pneumoniae  Klebsiella pneumoniae  bacteremia  RUQ abdominal pain     Remains afebrile. CXR negative. UA negative. Influenza negative.   WBC count back to WNL.   Repeat blood cultures are negative.   CT abd/pelvis did not really show a source either.   LFTs are mildly elevated  Continue cefazolin 2 gram IV BID  ID consulted - recommended 2D echo and GI consult   GI consulted - plan for EUS in the AM. NPO at midnight. Patient did receive Eliquis this morning       Acute kidney injury superimposed on chronic kidney disease  CKD (chronic kidney disease) stage 3, GFR 30-59 ml/min    Creatinine stable at her baseline Cr 1.34  Avoid nephrotoxic meds   Continue to monitor off IV fluids, since has poor po intake still.     GERD (gastroesophageal reflux disease)  Continue PPI       Anemia of chronic disease  H/H stable, monitor       Depression  Continue effexor       Essential hypertension  Hyperlipidemia   Paroxysmal atrial fibrillation  Chronic anticoagulation  CAD (coronary artery disease)  Chronic diastolic heart failure  Tachy-tejal syndrome s/p pacemaker placement     Continue amiodarone, furosemide and metoprolol. Eliquis held due to EUS tomorrow    Continue statin  Telemetry monitoring.       VTE Risk Mitigation (From admission, onward)         Ordered     Place JENNIFER hose  Until discontinued      02/11/20 9321                      Brad Butler DO  Department of Hospital Medicine   [facility]

## 2020-02-16 NOTE — ASSESSMENT & PLAN NOTE
- Cont ABX  - Will plan for EUS in the morning.  - I am holding Eliquis in case she needs intervention.  If that is the case, it likely can not be done until Tuesday as she received a dose of Eliquis this morning.

## 2020-02-16 NOTE — ASSESSMENT & PLAN NOTE
Hyperlipidemia   Paroxysmal atrial fibrillation  Chronic anticoagulation  CAD (coronary artery disease)  Chronic diastolic heart failure  Tachy-tejal syndrome s/p pacemaker placement     Continue amiodarone, furosemide and metoprolol. Eliquis held due to EUS tomorrow    Continue statin  Telemetry monitoring.

## 2020-02-16 NOTE — PLAN OF CARE
Problem: SLP Goal  Goal: SLP Goal  Description  STGs:  1. Pt will participate in clinical swallow assessment to determine LRD.-Met 2/16/2020  2. Pt will consume Mechanical Soft/Thin liquid diet without any overt s/s of aspiration. -Met 2/16/2020     Outcome: Met   2/16/2020: Bedside swallow study completed. No overt s/s of aspiration noted across consistencies. Pt safe to continue on Mechanical Soft/Thin liquid diet given standard aspiration and GERD precautions. No further SLP needs at this time, please re-consult should change in status occur.

## 2020-02-16 NOTE — CONSULTS
[facility]  Gastroenterology  Consult Note    Patient Name: Katherine Hernandez  MRN: [id number]  Admission Date: 2/11/2020  Hospital Length of Stay: 4 days  Code Status: Full Code   Attending Provider: Brad Butler DO   Consulting Provider: Berna Morales MD  Primary Care Physician: Israel Batista MD  Principal Problem:Sepsis due to Klebsiella pneumoniae    Inpatient consult to [facility]  Consult performed by: Berna Morales MD  Consult ordered by: Brad Butler DO  Reason for consult: Klebsiella bacteremia, RUQ pain  Assessment/Recommendations: Please see A/P below        Subjective:     HPI:  89 yo F admitted for RUQ pain, severe weakness found to have Klebsiella bacteremia.  The pt states that she is feeling much stronger since admission, but still has this RUQ pain.  She is a poor historian and looks to her family to answer her questions.  She can not describe the pain; she just says it only goes away with the pain medication.  This is the first time she has had anything like this.  Her gallbladder was removed many years ago.  All sources checked for source of bacteremia have been negative.  Family reports that she was not able to eat at admission, was just too weak, but now she is eating ok, but appetite is decreased.    Past Medical History:   Diagnosis Date    Anticoagulant long-term use     Anxiety     Arthritis     Bronchitis 11/13/2014    CAD (coronary artery disease)     Cancer     Chronic back pain     Chronic diastolic CHF (congestive heart failure)     Encounter for blood transfusion     GERD (gastroesophageal reflux disease)     Hyperlipidemia     Hypertension     Pacemaker     Paroxysmal A-fib     Thyroid disease        Past Surgical History:   Procedure Laterality Date    CARDIAC SURGERY      pacemaker placement    CERVICAL SPINE SURGERY      CHOLECYSTECTOMY      HIP SURGERY         Review of patient's allergies indicates:    Allergen Reactions    Ace inhibitors     Diovan hct [valsartan-hydrochlorothiazide] Swelling    Fosinopril sodium Swelling    Codeine Rash    Niaspan extended-release [niacin] Rash     Family History     None        Tobacco Use    Smoking status: Former Smoker     Last attempt to quit: 1984     Years since quittin.5    Smokeless tobacco: Never Used   Substance and Sexual Activity    Alcohol use: No    Drug use: No    Sexual activity: Never     Review of Systems   Constitutional: Positive for appetite change and fatigue.   Eyes: Negative for photophobia and visual disturbance.   Respiratory: Negative for chest tightness, shortness of breath and wheezing.    Cardiovascular: Negative for chest pain, palpitations and leg swelling.   Genitourinary: Negative for dysuria, flank pain and hematuria.   Musculoskeletal: Negative for joint swelling and myalgias.   Skin: Negative for color change and rash.   Neurological: Negative for dizziness and speech difficulty.   Psychiatric/Behavioral: Negative for confusion and hallucinations.     Objective:     Vital Signs (Most Recent):  Temp: 97.7 °F (36.5 °C) (20 1205)  Pulse: 63 (20 1205)  Resp: 20 (20 1205)  BP: (!) 186/81 (20 1205)  SpO2: (!) 94 % (20 1154) Vital Signs (24h Range):  Temp:  [97.7 °F (36.5 °C)-98.4 °F (36.9 °C)] 97.7 °F (36.5 °C)  Pulse:  [60-66] 63  Resp:  [17-20] 20  SpO2:  [91 %-94 %] 94 %  BP: (149-193)/(70-81) 186/81     Weight: 76 kg (167 lb 8.8 oz) (20 0430)  Body mass index is 30.65 kg/m².      Intake/Output Summary (Last 24 hours) at 2020 1330  Last data filed at 2020 0632  Gross per 24 hour   Intake 460 ml   Output 1500 ml   Net -1040 ml       Lines/Drains/Airways     Peripheral Intravenous Line                 Peripheral IV - Single Lumen 20 22 G Left;Posterior Forearm 3 days                Physical Exam   Constitutional: She is oriented to person, place, and time. She appears  well-developed and well-nourished.   Very hard of hearing   Eyes: Pupils are equal, round, and reactive to light. EOM are normal.   Neck: Normal range of motion. Neck supple.   Cardiovascular: Normal rate and regular rhythm.   Pulmonary/Chest: Effort normal and breath sounds normal.   Abdominal: Soft. Bowel sounds are normal. She exhibits no distension and no mass. There is tenderness. There is no rebound and no guarding.   +TTP RUQ, liver edge is smooth and mildly TTP   Musculoskeletal: Normal range of motion. She exhibits no edema.   Neurological: She is alert and oriented to person, place, and time.   Skin:   Extensive bruising to BUE   Psychiatric: She has a normal mood and affect. Her behavior is normal. Judgment and thought content normal.       Significant Labs:  Blood Culture: No results for input(s): LABBLOO in the last 48 hours.  CBC:   Recent Labs   Lab 02/15/20  0453 02/16/20  0549   WBC 5.32 5.53   HGB 8.3* 8.5*   HCT 27.3* 27.3*    264     CMP:   Recent Labs   Lab 02/16/20  0549   GLU 81   CALCIUM 8.0*   ALBUMIN 2.9*   PROT 6.0      K 2.8*   CO2 29   CL 99   BUN 10   CREATININE 1.4   ALKPHOS 68   ALT 24   AST 36   BILITOT 0.3     Coagulation: No results for input(s): PT, INR, APTT in the last 48 hours.    Significant Imaging:  Imaging results within the past 24 hours have been reviewed.   CBD is prominent    Assessment/Plan:     * Sepsis due to Klebsiella pneumoniae  - Cont ABX  - Will plan for EUS in the morning.  - I am holding Eliquis in case she needs intervention.  If that is the case, it likely can not be done until Tuesday as she received a dose of Eliquis this morning.        Thank you for your consult. I will follow-up with patient. Please contact us if you have any additional questions.    Berna Morales MD  Gastroenterology  [facility]

## 2020-02-16 NOTE — PROGRESS NOTES
"[facility] Medicine  Progress Note    Patient Name: Katherine Hernandez  MRN: [id number]  Patient Class: IP- Inpatient   Admission Date: 2/11/2020  Length of Stay: 3 days  Attending Physician: Brad Butler DO  Primary Care Provider: Israel Batista MD        Subjective:     Principal Problem:Sepsis due to Klebsiella pneumoniae        HPI:  Katherine Hernandez is an 87 yo female with HTN, HLD, chronic diastolic heart failure, paroxysmal atrial fibrillation, long tern anticoagulation use, carotid artery disease, chronic kidney disease stage 3, anemia of chronic disease, tachy-tejal syndrome, s/p pacemaker placement, chronic neck pain, chronic back pain and depression who presented to [facility] ED 2/11/20 with complaint of generalized body aches associated with chest pain, abdominal pain and n/v x 1. Family at bedside also reports intermittent dry nonproductive cough. No hematemesis or melena. Pt with low grade fever (Tmax 100.2) in ED. Initial labs remarkable for H/H 9.5/31, BUN/Cr 27/1.6 (previously 22/1.3). Troponin negative. No acute changes on telemetry. Influenza negative. CXR negative. She received Vancomycin/Zosyn and 2 liters normal saline. Patient admitted to [facility].     Overview/Hospital Course:  Blood cultures collected 2/11 positive for gram negative rods. Repeat cultures 2/13 pending. Pt afebrile. She reports lower abdominal/pelvic pain on exam. Nausea/vomiting resolved. CT abdomen did not really show any acute findings that could be source of bacteremia. Blood cultures resulted and pan-sensitive Klebsiella pneumoniae, so zosyn was transitioned to cefazolin.     Interval History: afebrile. Patient had 2 "loose" stools this morning. Continues to have RUQ abd pain and some nausea. Repeat blood cultures NGTD. ID consulted for bacteremia     Review of Systems   Constitutional: Negative for chills and fever.   Respiratory: Negative for cough and shortness " of breath.    Cardiovascular: Negative for chest pain and palpitations.   Gastrointestinal: Positive for abdominal pain and nausea. Negative for vomiting.   Genitourinary: Negative for dysuria.     Objective:     Vital Signs (Most Recent):  Temp: 98.3 °F (36.8 °C) (02/15/20 1656)  Pulse: 65 (02/15/20 1656)  Resp: 20 (02/15/20 1656)  BP: (!) 171/78 (02/15/20 1656)  SpO2: (!) 94 % (02/15/20 1530) Vital Signs (24h Range):  Temp:  [97.4 °F (36.3 °C)-98.3 °F (36.8 °C)] 98.3 °F (36.8 °C)  Pulse:  [60-65] 65  Resp:  [16-20] 20  SpO2:  [92 %-94 %] 94 %  BP: (130-171)/(60-78) 171/78     Weight: 75.2 kg (165 lb 12.6 oz)  Body mass index is 30.32 kg/m².    Intake/Output Summary (Last 24 hours) at 2/15/2020 1753  Last data filed at 2/15/2020 1100  Gross per 24 hour   Intake 150 ml   Output 244 ml   Net -94 ml      Physical Exam   Constitutional: She is oriented to person, place, and time. She appears well-developed and well-nourished. No distress.   Cardiovascular: Normal rate and regular rhythm.   No murmur heard.  Pulmonary/Chest: Effort normal and breath sounds normal. No respiratory distress. She has no wheezes.   Abdominal: Soft. Bowel sounds are normal. She exhibits no distension. There is tenderness in the right upper quadrant and epigastric area.   Musculoskeletal: She exhibits no edema.   Neurological: She is alert and oriented to person, place, and time.   Skin: Skin is warm and dry.   Psychiatric: She has a normal mood and affect. Her behavior is normal.   Nursing note and vitals reviewed.      Significant Labs: All pertinent labs within the past 24 hours have been reviewed.    Significant Imaging: I have reviewed all pertinent imaging results/findings within the past 24 hours.      Assessment/Plan:      * Sepsis due to Klebsiella pneumoniae  Klebsiella pneumoniae bacteremia  Remains afebrile. CXR negative. UA negative. Influenza negative.   WBC count back to WNL.   Repeat blood cultures are negative.   CT abd/pelvis  did not really show a source either.   LFTs are mildly elevated  Continue cefazolin 2 gram IV BID  ID consulted - recommended 2D echo and possible GI consult       Acute kidney injury superimposed on chronic kidney disease  CKD (chronic kidney disease) stage 3, GFR 30-59 ml/min    Creatinine stable at her baseline Cr 1.3.   Avoid nephrotoxic meds   Continue to monitor off IV fluids, since has poor po intake still.     GERD (gastroesophageal reflux disease)  Continue PPI       Anemia of chronic disease  H/H stable, monitor       Depression  Continue effexor       Essential hypertension  Hyperlipidemia   Paroxysmal atrial fibrillation  Chronic anticoagulation  CAD (coronary artery disease)  Chronic diastolic heart failure  Tachy-tejal syndrome s/p pacemaker placement     Continue amiodarone, apixaban, furosemide and metoprolol   Continue statin  Telemetry monitoring.       VTE Risk Mitigation (From admission, onward)         Ordered     apixaban tablet 5 mg  2 times daily      02/12/20 0956     Place JENNIFER hose  Until discontinued      02/11/20 9214                      Brad Butler DO  Department of Hospital Medicine   [facility]

## 2020-02-16 NOTE — ASSESSMENT & PLAN NOTE
87 y/o female with HTN, HLD, CHF, a fib, Carotid artery disease, CKD3, anemia, tachy tejal, s/p pacemaker, chronic neck pain and back pain presented to [facility] 2/11 with body aches, nausea vomiting and diarrhea with low grade fever 100.2 in ED. Was admitted and BC on admit positive for klebsiella pneumoniae sensitive to all. Patient was originally on vanco and zosyn and was streamlined to cefazolin on 2/14. Patient with abdominal pain. Ct abdomen and pelvis - s/p cholecystectomy and slight dilation of common bile duct, sigmoid diverticulosis, bilateral small pleural effusions.     Rec:  Continue cefazolin 2 grams IVPB every 12 h as is for now (cr cl around 24.6)  Recommend echocardiogram rule out endocarditis -patient has a heart murmur.   GI evaluation in progress  Not clear on source of the klebsiella bacteremia at this point  Watch LTFs, improved over yesterday  Watch diarrhea - may need workup if it persists

## 2020-02-16 NOTE — SUBJECTIVE & OBJECTIVE
Past Medical History:   Diagnosis Date    Anticoagulant long-term use     Anxiety     Arthritis     Bronchitis 2014    CAD (coronary artery disease)     Cancer     Chronic back pain     Chronic diastolic CHF (congestive heart failure)     Encounter for blood transfusion     GERD (gastroesophageal reflux disease)     Hyperlipidemia     Hypertension     Pacemaker     Paroxysmal A-fib     Thyroid disease        Past Surgical History:   Procedure Laterality Date    CARDIAC SURGERY      pacemaker placement    CERVICAL SPINE SURGERY      CHOLECYSTECTOMY      HIP SURGERY         Review of patient's allergies indicates:   Allergen Reactions    Ace inhibitors     Diovan hct [valsartan-hydrochlorothiazide] Swelling    Fosinopril sodium Swelling    Codeine Rash    Niaspan extended-release [niacin] Rash     Family History     None        Tobacco Use    Smoking status: Former Smoker     Last attempt to quit: 1984     Years since quittin.5    Smokeless tobacco: Never Used   Substance and Sexual Activity    Alcohol use: No    Drug use: No    Sexual activity: Never     Review of Systems   Constitutional: Positive for appetite change and fatigue.   Eyes: Negative for photophobia and visual disturbance.   Respiratory: Negative for chest tightness, shortness of breath and wheezing.    Cardiovascular: Negative for chest pain, palpitations and leg swelling.   Genitourinary: Negative for dysuria, flank pain and hematuria.   Musculoskeletal: Negative for joint swelling and myalgias.   Skin: Negative for color change and rash.   Neurological: Negative for dizziness and speech difficulty.   Psychiatric/Behavioral: Negative for confusion and hallucinations.     Objective:     Vital Signs (Most Recent):  Temp: 97.7 °F (36.5 °C) (20 1205)  Pulse: 63 (20 1205)  Resp: 20 (20 1205)  BP: (!) 186/81 (20 1205)  SpO2: (!) 94 % (20 1154) Vital Signs (24h Range):  Temp:  [97.7  °F (36.5 °C)-98.4 °F (36.9 °C)] 97.7 °F (36.5 °C)  Pulse:  [60-66] 63  Resp:  [17-20] 20  SpO2:  [91 %-94 %] 94 %  BP: (149-193)/(70-81) 186/81     Weight: 76 kg (167 lb 8.8 oz) (02/16/20 0430)  Body mass index is 30.65 kg/m².      Intake/Output Summary (Last 24 hours) at 2/16/2020 1330  Last data filed at 2/16/2020 0632  Gross per 24 hour   Intake 460 ml   Output 1500 ml   Net -1040 ml       Lines/Drains/Airways     Peripheral Intravenous Line                 Peripheral IV - Single Lumen 02/13/20 22 G Left;Posterior Forearm 3 days                Physical Exam   Constitutional: She is oriented to person, place, and time. She appears well-developed and well-nourished.   Very hard of hearing   Eyes: Pupils are equal, round, and reactive to light. EOM are normal.   Neck: Normal range of motion. Neck supple.   Cardiovascular: Normal rate and regular rhythm.   Pulmonary/Chest: Effort normal and breath sounds normal.   Abdominal: Soft. Bowel sounds are normal. She exhibits no distension and no mass. There is tenderness. There is no rebound and no guarding.   +TTP RUQ, liver edge is smooth and mildly TTP   Musculoskeletal: Normal range of motion. She exhibits no edema.   Neurological: She is alert and oriented to person, place, and time.   Skin:   Extensive bruising to BUE   Psychiatric: She has a normal mood and affect. Her behavior is normal. Judgment and thought content normal.       Significant Labs:  Blood Culture: No results for input(s): LABBLOO in the last 48 hours.  CBC:   Recent Labs   Lab 02/15/20  0453 02/16/20  0549   WBC 5.32 5.53   HGB 8.3* 8.5*   HCT 27.3* 27.3*    264     CMP:   Recent Labs   Lab 02/16/20  0549   GLU 81   CALCIUM 8.0*   ALBUMIN 2.9*   PROT 6.0      K 2.8*   CO2 29   CL 99   BUN 10   CREATININE 1.4   ALKPHOS 68   ALT 24   AST 36   BILITOT 0.3     Coagulation: No results for input(s): PT, INR, APTT in the last 48 hours.    Significant Imaging:  Imaging results within the past 24  hours have been reviewed.   CBD is prominent

## 2020-02-16 NOTE — SUBJECTIVE & OBJECTIVE
Interval History: Patient had nausea yesterday and still with abdominal pain today. GI workup in progress. Planned endoscopy tomorrow.     Review of Systems   Gastrointestinal: Positive for abdominal pain and nausea. Negative for vomiting.     Antibiotics (From admission, onward)    Start     Stop Route Frequency Ordered    02/14/20 1915  cefazolin (ANCEF) 2 gram in dextrose 5% 50 mL IVPB (premix)      -- IV Every 12 hours (non-standard times) 02/14/20 1800        Objective:     Vital Signs (Most Recent):  Temp: 97.6 °F (36.4 °C) (02/16/20 1540)  Pulse: 60 (02/16/20 1552)  Resp: 20 (02/16/20 1540)  BP: (!) 152/70 (02/16/20 1540)  SpO2: (!) 94 % (02/16/20 1154) Vital Signs (24h Range):  Temp:  [97.6 °F (36.4 °C)-98.4 °F (36.9 °C)] 97.6 °F (36.4 °C)  Pulse:  [60-66] 60  Resp:  [17-20] 20  SpO2:  [91 %-94 %] 94 %  BP: (141-193)/(64-81) 152/70     Weight: 76 kg (167 lb 8.8 oz)  Body mass index is 30.65 kg/m².    Estimated Creatinine Clearance: 26.5 mL/min (based on SCr of 1.4 mg/dL).    Physical Exam   HENT:   Hard of hearing   Cardiovascular:   Murmur heard.  2/6 systolic murmur left sternal border   Pulmonary/Chest: Breath sounds normal.   Abdominal: Bowel sounds are normal. She exhibits no distension. There is tenderness.   Some tenderness RUQ and RLQ   Musculoskeletal: She exhibits no edema.   Neurological: She is alert.   Skin:   Ecchymoses on thighs and arms, very thin skin       Significant Labs:   Blood Culture:   Recent Labs   Lab 02/11/20 1945 02/11/20 1946 02/13/20  1040 02/13/20  1049   LABBLOO Gram stain aer bottle: Gram negative rods   Results called to and read back by: Katelin Ramachandran RN  02/12/2020    11:41  KLEBSIELLA PNEUMONIAE* No Growth to date  No Growth to date  No Growth to date  No Growth to date  No Growth to date No Growth to date  No Growth to date  No Growth to date  No Growth to date No Growth to date  No Growth to date  No Growth to date  No Growth to date     CBC:   Recent  Labs   Lab 02/15/20  0453 02/16/20  0549   WBC 5.32 5.53   HGB 8.3* 8.5*   HCT 27.3* 27.3*    264     CMP:   Recent Labs   Lab 02/15/20  0452 02/16/20  0549    138   K 3.1* 2.8*   CL 99 99   CO2 29 29   GLU 82 81   BUN 12 10   CREATININE 1.5* 1.4   CALCIUM 8.0* 8.0*   PROT 6.2 6.0   ALBUMIN 3.0* 2.9*   BILITOT 0.3 0.3   ALKPHOS 70 68   AST 46* 36   ALT 55* 24   ANIONGAP 9 10   EGFRNONAA 31* 34*     Urine Studies:   Recent Labs   Lab 02/14/20  0526   COLORU Yellow   APPEARANCEUA Clear   PHUR 6.0   SPECGRAV 1.010   PROTEINUA Negative   GLUCUA Negative   KETONESU Negative   BILIRUBINUA Negative   OCCULTUA Trace*   NITRITE Negative   UROBILINOGEN Negative   LEUKOCYTESUR Negative       Significant Imaging: no new imaging since 2/13

## 2020-02-16 NOTE — HPI
87 yo F admitted for RUQ pain, severe weakness found to have Klebsiella bacteremia.  The pt states that she is feeling much stronger since admission, but still has this RUQ pain.  She is a poor historian and looks to her family to answer her questions.  She can not describe the pain; she just says it only goes away with the pain medication.  This is the first time she has had anything like this.  Her gallbladder was removed many years ago.  All sources checked for source of bacteremia have been negative.  Family reports that she was not able to eat at admission, was just too weak, but now she is eating ok, but appetite is decreased.

## 2020-02-16 NOTE — PLAN OF CARE
VN cued into room.  Pt resting comfortably in bed with call light and personal belongings within reach.  NADN.  Pt is extremely hard of hearing.  Family at bedside.  Pt reports no pain.  No other needs or complaints at this time.  Reminded pt to use call light as needed.  VN will continue to follow and be available as needed.

## 2020-02-16 NOTE — ASSESSMENT & PLAN NOTE
Klebsiella pneumoniae bacteremia  RUQ abdominal pain     Remains afebrile. CXR negative. UA negative. Influenza negative.   WBC count back to WNL.   Repeat blood cultures are negative.   CT abd/pelvis did not really show a source either.   LFTs are mildly elevated  Continue cefazolin 2 gram IV BID  ID consulted - recommended 2D echo and GI consult   GI consulted - plan for EUS in the AM. NPO at midnight. Patient did receive Eliquis this morning

## 2020-02-17 ENCOUNTER — ANESTHESIA EVENT (OUTPATIENT)
Dept: ENDOSCOPY | Facility: HOSPITAL | Age: 85
DRG: 872 | End: 2020-02-17
Payer: MEDICARE

## 2020-02-17 ENCOUNTER — ANESTHESIA (OUTPATIENT)
Dept: ENDOSCOPY | Facility: HOSPITAL | Age: 85
DRG: 872 | End: 2020-02-17
Payer: MEDICARE

## 2020-02-17 LAB
ALBUMIN SERPL BCP-MCNC: 2.8 G/DL (ref 3.5–5.2)
ALP SERPL-CCNC: 65 U/L (ref 55–135)
ALT SERPL W/O P-5'-P-CCNC: 10 U/L (ref 10–44)
ANION GAP SERPL CALC-SCNC: 9 MMOL/L (ref 8–16)
AORTIC ROOT ANNULUS: 2.06 CM
AORTIC VALVE CUSP SEPERATION: 1.21 CM
AST SERPL-CCNC: 29 U/L (ref 10–40)
AV INDEX (PROSTH): 0.79
AV MEAN GRADIENT: 5 MMHG
AV PEAK GRADIENT: 10 MMHG
AV REGURGITATION PRESSURE HALF TIME: 390 MS
AV VALVE AREA: 2.19 CM2
AV VELOCITY RATIO: 0.69
BASOPHILS # BLD AUTO: 0.01 K/UL (ref 0–0.2)
BASOPHILS NFR BLD: 0.2 % (ref 0–1.9)
BILIRUB DIRECT SERPL-MCNC: 0.2 MG/DL (ref 0.1–0.3)
BILIRUB SERPL-MCNC: 0.3 MG/DL (ref 0.1–1)
BSA FOR ECHO PROCEDURE: 1.82 M2
BUN SERPL-MCNC: 8 MG/DL (ref 8–23)
CALCIUM SERPL-MCNC: 8 MG/DL (ref 8.7–10.5)
CHLORIDE SERPL-SCNC: 100 MMOL/L (ref 95–110)
CO2 SERPL-SCNC: 29 MMOL/L (ref 23–29)
CREAT SERPL-MCNC: 1.3 MG/DL (ref 0.5–1.4)
CV ECHO LV RWT: 0.26 CM
DIFFERENTIAL METHOD: ABNORMAL
DOP CALC AO PEAK VEL: 1.6 M/S
DOP CALC AO VTI: 33.78 CM
DOP CALC LVOT AREA: 2.8 CM2
DOP CALC LVOT DIAMETER: 1.88 CM
DOP CALC LVOT PEAK VEL: 1.1 M/S
DOP CALC LVOT STROKE VOLUME: 73.86 CM3
DOP CALCLVOT PEAK VEL VTI: 26.62 CM
E WAVE DECELERATION TIME: 184.36 MSEC
E/A RATIO: 1.31
E/E' RATIO: 11.06 M/S
ECHO LV POSTERIOR WALL: 0.57 CM (ref 0.6–1.1)
EOSINOPHIL # BLD AUTO: 0.3 K/UL (ref 0–0.5)
EOSINOPHIL NFR BLD: 5.5 % (ref 0–8)
ERYTHROCYTE [DISTWIDTH] IN BLOOD BY AUTOMATED COUNT: 15.4 % (ref 11.5–14.5)
EST. GFR  (AFRICAN AMERICAN): 42 ML/MIN/1.73 M^2
EST. GFR  (NON AFRICAN AMERICAN): 37 ML/MIN/1.73 M^2
FRACTIONAL SHORTENING: 44 % (ref 28–44)
GLUCOSE SERPL-MCNC: 86 MG/DL (ref 70–110)
HCT VFR BLD AUTO: 26.6 % (ref 37–48.5)
HGB BLD-MCNC: 8.3 G/DL (ref 12–16)
IMM GRANULOCYTES # BLD AUTO: 0.06 K/UL (ref 0–0.04)
IMM GRANULOCYTES NFR BLD AUTO: 1 % (ref 0–0.5)
INTERVENTRICULAR SEPTUM: 0.69 CM (ref 0.6–1.1)
IVRT: 0.06 MSEC
LA MAJOR: 5.21 CM
LA MINOR: 5.5 CM
LA WIDTH: 4.05 CM
LEFT ATRIUM SIZE: 4.55 CM
LEFT ATRIUM VOLUME INDEX: 47.3 ML/M2
LEFT ATRIUM VOLUME: 83.82 CM3
LEFT INTERNAL DIMENSION IN SYSTOLE: 2.48 CM (ref 2.1–4)
LEFT VENTRICLE DIASTOLIC VOLUME INDEX: 50.57 ML/M2
LEFT VENTRICLE DIASTOLIC VOLUME: 89.54 ML
LEFT VENTRICLE MASS INDEX: 46 G/M2
LEFT VENTRICLE SYSTOLIC VOLUME INDEX: 12.4 ML/M2
LEFT VENTRICLE SYSTOLIC VOLUME: 21.97 ML
LEFT VENTRICULAR INTERNAL DIMENSION IN DIASTOLE: 4.44 CM (ref 3.5–6)
LEFT VENTRICULAR MASS: 81.86 G
LV LATERAL E/E' RATIO: 10.44 M/S
LV SEPTAL E/E' RATIO: 11.75 M/S
LYMPHOCYTES # BLD AUTO: 0.9 K/UL (ref 1–4.8)
LYMPHOCYTES NFR BLD: 15.8 % (ref 18–48)
MAGNESIUM SERPL-MCNC: 2.1 MG/DL (ref 1.6–2.6)
MCH RBC QN AUTO: 31 PG (ref 27–31)
MCHC RBC AUTO-ENTMCNC: 31.2 G/DL (ref 32–36)
MCV RBC AUTO: 99 FL (ref 82–98)
MONOCYTES # BLD AUTO: 0.7 K/UL (ref 0.3–1)
MONOCYTES NFR BLD: 11.3 % (ref 4–15)
MV PEAK A VEL: 0.72 M/S
MV PEAK E VEL: 0.94 M/S
NEUTROPHILS # BLD AUTO: 3.9 K/UL (ref 1.8–7.7)
NEUTROPHILS NFR BLD: 66.2 % (ref 38–73)
NRBC BLD-RTO: 0 /100 WBC
PHOSPHATE SERPL-MCNC: 3.4 MG/DL (ref 2.7–4.5)
PISA TR MAX VEL: 3.44 M/S
PLATELET # BLD AUTO: 273 K/UL (ref 150–350)
PMV BLD AUTO: 9.4 FL (ref 9.2–12.9)
POTASSIUM SERPL-SCNC: 3 MMOL/L (ref 3.5–5.1)
PROT SERPL-MCNC: 5.8 G/DL (ref 6–8.4)
PULM VEIN S/D RATIO: 1.54
PV PEAK D VEL: 0.37 M/S
PV PEAK S VEL: 0.57 M/S
PV PEAK VELOCITY: 0.96 CM/S
RA MAJOR: 4.84 CM
RA PRESSURE: 3 MMHG
RA WIDTH: 4.35 CM
RBC # BLD AUTO: 2.68 M/UL (ref 4–5.4)
SODIUM SERPL-SCNC: 138 MMOL/L (ref 136–145)
STJ: 2.69 CM
TDI LATERAL: 0.09 M/S
TDI SEPTAL: 0.08 M/S
TDI: 0.09 M/S
TR MAX PG: 47 MMHG
TRICUSPID ANNULAR PLANE SYSTOLIC EXCURSION: 2.62 CM
TV REST PULMONARY ARTERY PRESSURE: 50 MMHG
WBC # BLD AUTO: 5.83 K/UL (ref 3.9–12.7)

## 2020-02-17 PROCEDURE — 36415 COLL VENOUS BLD VENIPUNCTURE: CPT | Mod: HCNC

## 2020-02-17 PROCEDURE — 85025 COMPLETE CBC W/AUTO DIFF WBC: CPT | Mod: HCNC

## 2020-02-17 PROCEDURE — 25000003 PHARM REV CODE 250: Mod: HCNC | Performed by: NURSE PRACTITIONER

## 2020-02-17 PROCEDURE — 27201674 HC SPHINCTERTOME: Mod: HCNC | Performed by: INTERNAL MEDICINE

## 2020-02-17 PROCEDURE — 80076 HEPATIC FUNCTION PANEL: CPT | Mod: HCNC

## 2020-02-17 PROCEDURE — 27202125 HC BALLOON, EXTRACTION (ANY): Mod: HCNC | Performed by: INTERNAL MEDICINE

## 2020-02-17 PROCEDURE — 97110 THERAPEUTIC EXERCISES: CPT | Mod: HCNC,CQ

## 2020-02-17 PROCEDURE — 74328 X-RAY BILE DUCT ENDOSCOPY: CPT | Mod: 26,HCNC,, | Performed by: INTERNAL MEDICINE

## 2020-02-17 PROCEDURE — 63600175 PHARM REV CODE 636 W HCPCS: Mod: HCNC | Performed by: FAMILY MEDICINE

## 2020-02-17 PROCEDURE — 43262 ENDO CHOLANGIOPANCREATOGRAPH: CPT | Mod: 51,HCNC,, | Performed by: INTERNAL MEDICINE

## 2020-02-17 PROCEDURE — 25500020 PHARM REV CODE 255: Mod: HCNC | Performed by: INTERNAL MEDICINE

## 2020-02-17 PROCEDURE — 43259 EGD US EXAM DUODENUM/JEJUNUM: CPT | Mod: HCNC | Performed by: INTERNAL MEDICINE

## 2020-02-17 PROCEDURE — 63600175 PHARM REV CODE 636 W HCPCS: Mod: HCNC | Performed by: HOSPITALIST

## 2020-02-17 PROCEDURE — 37000009 HC ANESTHESIA EA ADD 15 MINS: Mod: HCNC | Performed by: INTERNAL MEDICINE

## 2020-02-17 PROCEDURE — 43264 ERCP REMOVE DUCT CALCULI: CPT | Mod: HCNC | Performed by: INTERNAL MEDICINE

## 2020-02-17 PROCEDURE — 37000008 HC ANESTHESIA 1ST 15 MINUTES: Mod: HCNC | Performed by: INTERNAL MEDICINE

## 2020-02-17 PROCEDURE — 74328 PR  X-RAY FOR BILE DUCT ENDOSCOPY: ICD-10-PCS | Mod: 26,HCNC,, | Performed by: INTERNAL MEDICINE

## 2020-02-17 PROCEDURE — 43262 PR ERCP,SPHINCTEROTOMY: ICD-10-PCS | Mod: 51,HCNC,, | Performed by: INTERNAL MEDICINE

## 2020-02-17 PROCEDURE — 83735 ASSAY OF MAGNESIUM: CPT | Mod: HCNC

## 2020-02-17 PROCEDURE — C1769 GUIDE WIRE: HCPCS | Mod: HCNC | Performed by: INTERNAL MEDICINE

## 2020-02-17 PROCEDURE — 63600175 PHARM REV CODE 636 W HCPCS: Mod: HCNC | Performed by: NURSE ANESTHETIST, CERTIFIED REGISTERED

## 2020-02-17 PROCEDURE — 43259 PR ENDOSCOPIC ULTRASOUND EXAM: ICD-10-PCS | Mod: HCNC,51,, | Performed by: INTERNAL MEDICINE

## 2020-02-17 PROCEDURE — 25000003 PHARM REV CODE 250: Mod: HCNC | Performed by: NURSE ANESTHETIST, CERTIFIED REGISTERED

## 2020-02-17 PROCEDURE — 43259 EGD US EXAM DUODENUM/JEJUNUM: CPT | Mod: HCNC,51,, | Performed by: INTERNAL MEDICINE

## 2020-02-17 PROCEDURE — 94761 N-INVAS EAR/PLS OXIMETRY MLT: CPT | Mod: HCNC

## 2020-02-17 PROCEDURE — 43264 PR ERCP,W/REMOVAL STONE,BIL/PANCR DUCTS: ICD-10-PCS | Mod: HCNC,,, | Performed by: INTERNAL MEDICINE

## 2020-02-17 PROCEDURE — 80048 BASIC METABOLIC PNL TOTAL CA: CPT | Mod: HCNC

## 2020-02-17 PROCEDURE — 43277 ERCP EA DUCT/AMPULLA DILATE: CPT | Mod: HCNC | Performed by: INTERNAL MEDICINE

## 2020-02-17 PROCEDURE — 84100 ASSAY OF PHOSPHORUS: CPT | Mod: HCNC

## 2020-02-17 PROCEDURE — 43264 ERCP REMOVE DUCT CALCULI: CPT | Mod: HCNC,,, | Performed by: INTERNAL MEDICINE

## 2020-02-17 PROCEDURE — 21400001 HC TELEMETRY ROOM: Mod: HCNC

## 2020-02-17 PROCEDURE — 25000003 PHARM REV CODE 250: Mod: HCNC | Performed by: HOSPITALIST

## 2020-02-17 RX ORDER — ETOMIDATE 2 MG/ML
INJECTION INTRAVENOUS
Status: DISCONTINUED | OUTPATIENT
Start: 2020-02-17 | End: 2020-02-17

## 2020-02-17 RX ORDER — LIDOCAINE HCL/PF 100 MG/5ML
SYRINGE (ML) INTRAVENOUS
Status: DISCONTINUED | OUTPATIENT
Start: 2020-02-17 | End: 2020-02-17

## 2020-02-17 RX ORDER — SODIUM CHLORIDE 9 MG/ML
INJECTION, SOLUTION INTRAVENOUS CONTINUOUS PRN
Status: DISCONTINUED | OUTPATIENT
Start: 2020-02-17 | End: 2020-02-17

## 2020-02-17 RX ORDER — PROPOFOL 10 MG/ML
VIAL (ML) INTRAVENOUS
Status: DISCONTINUED | OUTPATIENT
Start: 2020-02-17 | End: 2020-02-17

## 2020-02-17 RX ORDER — FENTANYL CITRATE 50 UG/ML
INJECTION, SOLUTION INTRAMUSCULAR; INTRAVENOUS
Status: DISCONTINUED | OUTPATIENT
Start: 2020-02-17 | End: 2020-02-17

## 2020-02-17 RX ORDER — PROPOFOL 10 MG/ML
VIAL (ML) INTRAVENOUS CONTINUOUS PRN
Status: DISCONTINUED | OUTPATIENT
Start: 2020-02-17 | End: 2020-02-17

## 2020-02-17 RX ORDER — POTASSIUM CHLORIDE 20 MEQ/15ML
40 SOLUTION ORAL ONCE
Status: COMPLETED | OUTPATIENT
Start: 2020-02-17 | End: 2020-02-17

## 2020-02-17 RX ADMIN — OXYCODONE HYDROCHLORIDE AND ACETAMINOPHEN 1 TABLET: 5; 325 TABLET ORAL at 04:02

## 2020-02-17 RX ADMIN — PROPOFOL 20 MG: 10 INJECTION, EMULSION INTRAVENOUS at 11:02

## 2020-02-17 RX ADMIN — FENTANYL CITRATE 25 MCG: 50 INJECTION, SOLUTION INTRAMUSCULAR; INTRAVENOUS at 11:02

## 2020-02-17 RX ADMIN — HYDRALAZINE HYDROCHLORIDE 10 MG: 20 INJECTION INTRAMUSCULAR; INTRAVENOUS at 01:02

## 2020-02-17 RX ADMIN — CEFAZOLIN SODIUM 2 G: 2 SOLUTION INTRAVENOUS at 09:02

## 2020-02-17 RX ADMIN — PANTOPRAZOLE SODIUM 40 MG: 40 TABLET, DELAYED RELEASE ORAL at 09:02

## 2020-02-17 RX ADMIN — METOPROLOL TARTRATE 25 MG: 25 TABLET ORAL at 10:02

## 2020-02-17 RX ADMIN — LEVOTHYROXINE SODIUM 100 MCG: 100 TABLET ORAL at 06:02

## 2020-02-17 RX ADMIN — FUROSEMIDE 40 MG: 40 TABLET ORAL at 09:02

## 2020-02-17 RX ADMIN — STANDARDIZED SENNA CONCENTRATE AND DOCUSATE SODIUM 1 TABLET: 8.6; 5 TABLET ORAL at 10:02

## 2020-02-17 RX ADMIN — ETOMIDATE 4 MG: 2 INJECTION, SOLUTION INTRAVENOUS at 11:02

## 2020-02-17 RX ADMIN — IOHEXOL 10 ML: 300 INJECTION, SOLUTION INTRAVENOUS at 11:02

## 2020-02-17 RX ADMIN — AMIODARONE HYDROCHLORIDE 200 MG: 200 TABLET ORAL at 09:02

## 2020-02-17 RX ADMIN — Medication 100 MG: at 11:02

## 2020-02-17 RX ADMIN — VENLAFAXINE HYDROCHLORIDE 37.5 MG: 37.5 CAPSULE, EXTENDED RELEASE ORAL at 09:02

## 2020-02-17 RX ADMIN — FENTANYL CITRATE 10 MCG: 50 INJECTION, SOLUTION INTRAMUSCULAR; INTRAVENOUS at 11:02

## 2020-02-17 RX ADMIN — PROPOFOL 100 MCG/KG/MIN: 10 INJECTION, EMULSION INTRAVENOUS at 11:02

## 2020-02-17 RX ADMIN — SODIUM CHLORIDE: 0.9 INJECTION, SOLUTION INTRAVENOUS at 10:02

## 2020-02-17 RX ADMIN — ROSUVASTATIN CALCIUM 10 MG: 10 TABLET, COATED ORAL at 10:02

## 2020-02-17 RX ADMIN — TRAMADOL HYDROCHLORIDE 50 MG: 50 TABLET, FILM COATED ORAL at 06:02

## 2020-02-17 RX ADMIN — THERA TABS 1 TABLET: TAB at 09:02

## 2020-02-17 RX ADMIN — METOPROLOL TARTRATE 25 MG: 25 TABLET ORAL at 09:02

## 2020-02-17 RX ADMIN — TRAMADOL HYDROCHLORIDE 50 MG: 50 TABLET, FILM COATED ORAL at 01:02

## 2020-02-17 RX ADMIN — CEFAZOLIN SODIUM 2 G: 2 SOLUTION INTRAVENOUS at 10:02

## 2020-02-17 RX ADMIN — TRAMADOL HYDROCHLORIDE 50 MG: 50 TABLET, FILM COATED ORAL at 10:02

## 2020-02-17 RX ADMIN — POTASSIUM CHLORIDE 40 MEQ: 1.5 SOLUTION ORAL at 10:02

## 2020-02-17 NOTE — ANESTHESIA POSTPROCEDURE EVALUATION
Anesthesia Post Evaluation    Patient: Katherine Hernandez    Procedure(s) Performed: Procedure(s) (LRB):  ULTRASOUND, UPPER GI TRACT, ENDOSCOPIC (N/A)  ERCP (ENDOSCOPIC RETROGRADE CHOLANGIOPANCREATOGRAPHY) (N/A)    Final Anesthesia Type: MAC    Patient location during evaluation: GI PACU  Patient participation: Yes- Able to Participate  Level of consciousness: awake and alert  Post-procedure vital signs: reviewed and stable  Pain management: adequate  Airway patency: patent    PONV status at discharge: No PONV  Anesthetic complications: no      Cardiovascular status: blood pressure returned to baseline and hemodynamically stable  Respiratory status: unassisted, spontaneous ventilation and room air  Hydration status: euvolemic  Follow-up not needed.          Vitals Value Taken Time   /60 2/17/2020 10:25 AM   Temp 36.7 °C (98.1 °F) 2/17/2020 10:25 AM   Pulse 59 2/17/2020 10:25 AM   Resp 18 2/17/2020 10:25 AM   SpO2 96 % 2/17/2020 10:25 AM         No case tracking events are documented in the log.      Pain/Ansley Score: Pain Rating Prior to Med Admin: 0 (2/17/2020  7:06 AM)  Pain Rating Post Med Admin: 0 (2/17/2020  7:06 AM)

## 2020-02-17 NOTE — PLAN OF CARE
Problem: Physical Therapy Goal  Goal: Physical Therapy Goal  Description  1. Ambulate 40' with RW with CG  2.  Supine to/from sit with supervision  3.  Bed to/from chair with CG with RW  4.  Up in chair 2 hours     Outcome: Ongoing, Progressing   Goals ongoing

## 2020-02-17 NOTE — PLAN OF CARE
Patient is AAO X 4. Vital signs stable. Kept her NPO from 12 midnight for ESU today. Had Tramadol for pain. On tele monitor shows NSR with 1* AV Block. Daughter at bedside. Slept well during night. IV antibiotics given according to MAR. Will continue to monitor patient.

## 2020-02-17 NOTE — [employer] PATIENT INSTRUCTIONS
Discharge Summary/Instructions after an Endoscopic Procedure  Patient Name: Katherine Hernandez  Patient MRN: [id number]  Patient YOB: 1931 Monday, February 17, 2020  Faustino Kohli MD  RESTRICTIONS:  During your procedure today, you received medications for sedation.  These   medications may affect your judgment, balance and coordination.  Therefore,   for 24 hours, you have the following restrictions:   - DO NOT drive a car, operate machinery, make legal/financial decisions,   sign important papers or drink alcohol.    ACTIVITY:  Today: no heavy lifting, straining or running due to procedural   sedation/anesthesia.  The following day: return to full activity including work.  DIET:  Eat and drink normally unless instructed otherwise.     TREATMENT FOR COMMON SIDE EFFECTS:  - Mild abdominal pain, nausea, belching, bloating or excessive gas:  rest,   eat lightly and use a heating pad.  - Sore Throat: treat with throat lozenges and/or gargle with warm salt   water.  - Because air was used during the procedure, expelling large amounts of air   from your rectum or belching is normal.  - If a bowel prep was taken, you may not have a bowel movement for 1-3 days.    This is normal.  SYMPTOMS TO WATCH FOR AND REPORT TO YOUR PHYSICIAN:  1. Abdominal pain or bloating, other than gas cramps.  2. Chest pain.  3. Back pain.  4. Signs of infection such as: chills or fever occurring within 24 hours   after the procedure.  5. Rectal bleeding, which would show as bright red, maroon, or black stools.   (A tablespoon of blood from the rectum is not serious, especially if   hemorrhoids are present.)  6. Vomiting.  7. Weakness or dizziness.  GO DIRECTLY TO THE NEAREST EMERGENCY ROOM IF YOU HAVE ANY OF THE FOLLOWING:      Difficulty breathing              Chills and/or fever over 101 F   Persistent vomiting and/or vomiting blood   Severe abdominal pain   Severe chest pain   Black, tarry stools   Bleeding- more than one  tablespoon   Any other symptom or condition that you feel may need urgent attention  Your doctor recommends these additional instructions:  If any biopsies were taken, your doctors clinic will contact you in 1 to 2   weeks with any results.  - Proceed with ERCP.   - Resume previous diet.   - Continue present medications.   - Return to referring physician.   - No need for panc cyst surveillance given size, no worrisome features, age   and comorbidities.  For questions, problems or results please call your physician - Faustino Kohli MD at Work:  [phone].  EMERGENCY PHONE NUMBER: [phone],  LAB RESULTS: [phone]  IF A COMPLICATION OR EMERGENCY SITUATION ARISES AND YOU ARE UNABLE TO REACH   YOUR PHYSICIAN - GO DIRECTLY TO THE EMERGENCY ROOM.  Faustino Kohli MD  2/17/2020 12:08:16 PM  This report has been verified and signed electronically.  [employer]

## 2020-02-17 NOTE — PT/OT/SLP PROGRESS
Physical Therapy Treatment    Patient Name:  Katherine Hernandez   MRN:  [id number]    Recommendations:     Discharge Recommendations:  home with home health  Discharge Equipment Recommendations: none   Barriers to discharge: decreased mobility,strength and endurance    Assessment:     Katherine Hernandez is a 88 y.o. female admitted with a medical diagnosis of Sepsis due to Klebsiella pneumoniae.  She presents with the following impairments/functional limitations:  weakness, impaired endurance, impaired functional mobilty, decreased upper extremity function, decreased lower extremity function, pain, decreased ROM, impaired coordination ,pt with limited rx secondary to leaving for testing,pt with some R knee pain in flexion,pt will benefit from  services upon discharge.    Rehab Prognosis: Fair; patient would benefit from acute skilled PT services to address these deficits and reach maximum level of function.    Recent Surgery: Procedure(s) (LRB):  ULTRASOUND, UPPER GI TRACT, ENDOSCOPIC (N/A)  ERCP (ENDOSCOPIC RETROGRADE CHOLANGIOPANCREATOGRAPHY) (N/A) Day of Surgery    Plan:     During this hospitalization, patient to be seen 5 x/week to address the identified rehab impairments via gait training, therapeutic activities, therapeutic exercises, neuromuscular re-education, wheelchair management/training and progress toward the following goals:    · Plan of Care Expires:  03/12/20    Subjective     Chief Complaint: n/a  Patient/Family Comments/goals: pt agreeable to rx.  Pain/Comfort:  · Pain Rating 1: (no rating)  · Location - Side 1: Right  · Location - Orientation 1: generalized  · Location 1: knee(with flexion)  · Pain Addressed 1: Reposition, Distraction, Cessation of Activity      Objective:     Communicated with nsg prior to session.  Patient found supine with bed alarm, peripheral IV upon PT entry to room.     General Precautions: Standard, aspiration, fall, hearing impaired   Orthopedic Precautions:N/A   Braces: N/A      Functional Mobility:  · Gait: n/a      AM-PAC 6 CLICK MOBILITY  Turning over in bed (including adjusting bedclothes, sheets and blankets)?: 3  Sitting down on and standing up from a chair with arms (e.g., wheelchair, bedside commode, etc.): 3  Moving from lying on back to sitting on the side of the bed?: 3  Moving to and from a bed to a chair (including a wheelchair)?: 3  Need to walk in hospital room?: 3  Climbing 3-5 steps with a railing?: 1  Basic Mobility Total Score: 16       Therapeutic Activities and Exercises: le supine ex's X 10-12 reps with rest periods,pt rx limited secondary to leaving for testing.       Patient left supine with all lines intact, call button in reach and daughter present..    GOALS: see general POC  Multidisciplinary Problems     Physical Therapy Goals        Problem: Physical Therapy Goal    Goal Priority Disciplines Outcome Goal Variances Interventions   Physical Therapy Goal     PT, PT/OT Ongoing, Progressing     Description:  1. Ambulate 40' with RW with CG  2.  Supine to/from sit with supervision  3.  Bed to/from chair with CG with RW  4.  Up in chair 2 hours                      Time Tracking:     PT Received On: 02/17/20  PT Start Time: 0812     PT Stop Time: 0825  PT Total Time (min): 13 min     Billable Minutes: Therapeutic Exercise 13    Treatment Type: Treatment  PT/PTA: PTA     PTA Visit Number: 3     Franklyn Vaca, SHEILA  02/17/2020

## 2020-02-17 NOTE — NURSING
Pt back up to floor. VS taken and recorded. No complaints of pain. Will continue to monitor. Patient resting, very drowsy from procedure. Family at bedside.     2 skin tears noted. One to left dorsal hand and one to left posterior calf. Dressings with bloody drainage.

## 2020-02-17 NOTE — ANESTHESIA PREPROCEDURE EVALUATION
02/17/2020  Katherine Hernandez is a 88 y.o., female. HTN, HLD, HFpEF, paroxysmal A fib on antiocoagulants, carotid artery disease, CKD3, AoCD, pacemaker admitted with sepsis from Klebsiella pneumonia with no identified source. Getting endoscopic U/S.     Past Medical History:   Diagnosis Date    Anticoagulant long-term use     Anxiety     Arthritis     Bronchitis 11/13/2014    CAD (coronary artery disease)     Cancer     Chronic back pain     Chronic diastolic CHF (congestive heart failure)     Encounter for blood transfusion     GERD (gastroesophageal reflux disease)     Hyperlipidemia     Hypertension     Pacemaker     Paroxysmal A-fib     Thyroid disease      Past Surgical History:   Procedure Laterality Date    CARDIAC SURGERY      pacemaker placement    CERVICAL SPINE SURGERY      CHOLECYSTECTOMY      HIP SURGERY       Review of patient's allergies indicates:   Allergen Reactions    Ace inhibitors     Diovan hct [valsartan-hydrochlorothiazide] Swelling    Fosinopril sodium Swelling    Codeine Rash    Niaspan extended-release [niacin] Rash     No current facility-administered medications on file prior to encounter.      Current Outpatient Medications on File Prior to Encounter   Medication Sig Dispense Refill    amiodarone (PACERONE) 200 MG Tab Take 1 tablet (200 mg total) by mouth once daily. 30 tablet 11    apixaban (ELIQUIS) 5 mg Tab Take 5 mg by mouth 2 (two) times daily.       CA CARB/D3/MAG OX//ANNA/ZN (CALTRATE + D3 PLUS MINERALS ORAL) Take 1 tablet by mouth once daily.       levothyroxine (SYNTHROID) 25 MCG tablet Take 1 tablet (25 mcg total) by mouth before breakfast. (Patient taking differently: Take 100 mcg by mouth before breakfast. ) 30 tablet 11    metoprolol tartrate (LOPRESSOR) 25 MG tablet Take 1 tablet (25 mg total) by mouth 2 (two) times daily. 60 tablet  11    multivitamin (THERAGRAN) per tablet Take 1 tablet by mouth once daily.      omeprazole (PRILOSEC) 20 MG capsule Take 20 mg by mouth daily as needed.       oxyCODONE-acetaminophen (PERCOCET) 5-325 mg per tablet Take 1 tablet by mouth every 4 (four) hours as needed (pain 5-10/10 pain scale). 10 tablet 0    polycarbophil (FIBERCON) 625 mg tablet Take 625 mg by mouth once daily.      venlafaxine (EFFEXOR-XR) 37.5 MG 24 hr capsule TAKE 1 CAPSULE (37.5 MG TOTAL) BY MOUTH ONCE DAILY. 30 capsule 2    albuterol 90 mcg/actuation inhaler Inhale 2 puffs into the lungs every 6 (six) hours as needed for Wheezing. Rescue 1 Inhaler 0    furosemide (LASIX) 40 MG tablet Take 40 mg by mouth every other day.       gemfibrozil (LOPID) 600 MG tablet Take 300 mg by mouth. Take 1/2 tablet QHS      losartan (COZAAR) 25 MG tablet Take 1 tablet (25 mg total) by mouth every evening. 30 tablet 0    rosuvastatin (CRESTOR) 10 MG tablet Take 10 mg by mouth every evening.      senna-docusate 8.6-50 mg (SENNA WITH DOCUSATE SODIUM) 8.6-50 mg per tablet Take 1 tablet by mouth every evening.            Anesthesia Evaluation    I have reviewed the Patient Summary Reports.    I have reviewed the Nursing Notes.      Review of Systems  Anesthesia Hx:  No problems with previous Anesthesia   Denies Personal Hx of Anesthesia complications.   Hematology/Oncology:     Oncology Normal    -- Anemia:   EENT/Dental:EENT/Dental Normal   Cardiovascular:   Pacemaker Hypertension CAD  Dysrhythmias atrial fibrillation CHF    Pulmonary:  Pulmonary Normal    Renal/:   Chronic Renal Disease, CRI    Neurological:  Neurology Normal    Endocrine:   Hypothyroidism        Physical Exam  General:  Well nourished    Airway/Jaw/Neck:  Airway Findings: Mouth Opening: Normal Mallampati: III  TM Distance: Normal, at least 6 cm      Dental:  Dental Findings: Edentulous   Chest/Lungs:  Chest/Lungs Findings: Clear to auscultation     Heart/Vascular:  Heart Findings:  Rate: Normal  Rhythm: Irregularly Irregular  Heart Murmur  Systolic  Systolic Heart Murmur Grade: Grade II             Anesthesia Plan  Type of Anesthesia, risks & benefits discussed:  Anesthesia Type:  MAC, general  Patient's Preference:   Intra-op Monitoring Plan: standard ASA monitors  Intra-op Monitoring Plan Comments:   Post Op Pain Control Plan: multimodal analgesia  Post Op Pain Control Plan Comments:   Induction:   IV  Beta Blocker:         Informed Consent: Patient representative understands risks and agrees with Anesthesia plan.  Questions answered. Anesthesia consent signed with patient representative.  ASA Score: 3     Day of Surgery Review of History & Physical:            Ready For Surgery From Anesthesia Perspective.

## 2020-02-17 NOTE — TRANSFER OF CARE
"Anesthesia Transfer of Care Note    Patient: Katherine Hernandez    Procedure(s) Performed: Procedure(s) (LRB):  ULTRASOUND, UPPER GI TRACT, ENDOSCOPIC (N/A)  ERCP (ENDOSCOPIC RETROGRADE CHOLANGIOPANCREATOGRAPHY) (N/A)    Patient location: GI    Anesthesia Type: MAC    Transport from OR: Transported from OR on room air with adequate spontaneous ventilation    Post pain: adequate analgesia    Post assessment: no apparent anesthetic complications    Post vital signs: stable    Level of consciousness: awake    Nausea/Vomiting: no nausea/vomiting    Complications: none    Transfer of care protocol was followed      Last vitals:   Visit Vitals  BP (!) 167/60   Pulse (!) 59   Temp 36.7 °C (98.1 °F)   Resp 18   Ht 5' 2" (1.575 m)   Wt 75.8 kg (167 lb)   LMP  (LMP Unknown)   SpO2 96%   Breastfeeding? No   BMI 30.54 kg/m²     "

## 2020-02-17 NOTE — OR NURSING
Transferred onto [facility] stretcher for ERCP, secured with safety straps, skin tear noted posterior aspect of left lower leg. Covered with tegaderm.

## 2020-02-17 NOTE — PLAN OF CARE
Report given to MADISYN Styles about the outcome of the procedure. Patient's VSS and no complaints of pain or discomfort noted. Patient ready for transfer back to room 511.

## 2020-02-17 NOTE — PLAN OF CARE
VN rounds:  VN cued into pt's room with pt's permission.  Pt resting in bed in low position with bed alarm in place, call bell and family at side. Fall risk protocol discussed with pt's family, pt sedated from procedure and without any signs of pain, anxiety or dyspnea. VN instructed to call for assistance.  Family  aware and agreeable.   No acute distress noted.  Pt's chart, labs and vital signs reviewed.  Allowed time for questions.  Will continue to be available and intervene as needed.

## 2020-02-17 NOTE — [employer] PATIENT INSTRUCTIONS
Discharge Summary/Instructions after an Endoscopic Procedure  Patient Name: Katherine Hernandez  Patient MRN: [id number]  Patient YOB: 1931 Monday, February 17, 2020  Faustino Kohli MD  RESTRICTIONS:  During your procedure today, you received medications for sedation.  These   medications may affect your judgment, balance and coordination.  Therefore,   for 24 hours, you have the following restrictions:   - DO NOT drive a car, operate machinery, make legal/financial decisions,   sign important papers or drink alcohol.    ACTIVITY:  Today: no heavy lifting, straining or running due to procedural   sedation/anesthesia.  The following day: return to full activity including work.  DIET:  Eat and drink normally unless instructed otherwise.     TREATMENT FOR COMMON SIDE EFFECTS:  - Mild abdominal pain, nausea, belching, bloating or excessive gas:  rest,   eat lightly and use a heating pad.  - Sore Throat: treat with throat lozenges and/or gargle with warm salt   water.  - Because air was used during the procedure, expelling large amounts of air   from your rectum or belching is normal.  - If a bowel prep was taken, you may not have a bowel movement for 1-3 days.    This is normal.  SYMPTOMS TO WATCH FOR AND REPORT TO YOUR PHYSICIAN:  1. Abdominal pain or bloating, other than gas cramps.  2. Chest pain.  3. Back pain.  4. Signs of infection such as: chills or fever occurring within 24 hours   after the procedure.  5. Rectal bleeding, which would show as bright red, maroon, or black stools.   (A tablespoon of blood from the rectum is not serious, especially if   hemorrhoids are present.)  6. Vomiting.  7. Weakness or dizziness.  GO DIRECTLY TO THE NEAREST EMERGENCY ROOM IF YOU HAVE ANY OF THE FOLLOWING:      Difficulty breathing              Chills and/or fever over 101 F   Persistent vomiting and/or vomiting blood   Severe abdominal pain   Severe chest pain   Black, tarry stools   Bleeding- more than one  tablespoon   Any other symptom or condition that you feel may need urgent attention  Your doctor recommends these additional instructions:  If any biopsies were taken, your doctors clinic will contact you in 1 to 2   weeks with any results.  - [facility] to [facility] for [facility].   - Resume previous diet.   - Continue present medications.   - Return to referring physician.   - HOLD Eliquis for 2 days.   For questions, problems or results please call your physician - Faustino Kohli MD at Work:  [phone].  EMERGENCY PHONE NUMBER: [phone],  LAB RESULTS: [phone]  IF A COMPLICATION OR EMERGENCY SITUATION ARISES AND YOU ARE UNABLE TO REACH   YOUR PHYSICIAN - GO DIRECTLY TO THE EMERGENCY ROOM.  Faustino Kohli MD  2/17/2020 12:11:33 PM  This report has been verified and signed electronically.  [employer]

## 2020-02-17 NOTE — PLAN OF CARE
Report received from stacy    Patient is in cath lab at present time  Npo status verified   Patient alert and oriented

## 2020-02-18 VITALS
OXYGEN SATURATION: 92 % | TEMPERATURE: 98 F | HEART RATE: 61 BPM | DIASTOLIC BLOOD PRESSURE: 63 MMHG | BODY MASS INDEX: 30.44 KG/M2 | HEIGHT: 62 IN | RESPIRATION RATE: 20 BRPM | WEIGHT: 165.38 LBS | SYSTOLIC BLOOD PRESSURE: 142 MMHG

## 2020-02-18 PROBLEM — N17.9 ACUTE KIDNEY INJURY SUPERIMPOSED ON CHRONIC KIDNEY DISEASE: Status: RESOLVED | Noted: 2020-02-12 | Resolved: 2020-02-18

## 2020-02-18 PROBLEM — R74.01 TRANSAMINITIS: Status: ACTIVE | Noted: 2020-02-18

## 2020-02-18 PROBLEM — K80.50 CHOLEDOCHOLITHIASIS: Status: ACTIVE | Noted: 2020-02-16

## 2020-02-18 PROBLEM — N18.9 ACUTE KIDNEY INJURY SUPERIMPOSED ON CHRONIC KIDNEY DISEASE: Status: RESOLVED | Noted: 2020-02-12 | Resolved: 2020-02-18

## 2020-02-18 PROBLEM — I25.10 CAD (CORONARY ARTERY DISEASE): Chronic | Status: ACTIVE | Noted: 2018-02-02

## 2020-02-18 PROBLEM — A41.9 SEPSIS: Status: RESOLVED | Noted: 2020-02-12 | Resolved: 2020-02-18

## 2020-02-18 PROBLEM — R19.7 DIARRHEA: Status: ACTIVE | Noted: 2020-02-18

## 2020-02-18 PROBLEM — R19.7 DIARRHEA: Status: RESOLVED | Noted: 2020-02-18 | Resolved: 2020-02-18

## 2020-02-18 PROBLEM — N39.0 URINARY TRACT INFECTION WITH HEMATURIA: Status: RESOLVED | Noted: 2018-02-04 | Resolved: 2020-02-18
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
## 2020-02-18 PROBLEM — K80.50 CHOLEDOCHOLITHIASIS: Status: RESOLVED | Noted: 2020-02-16 | Resolved: 2020-02-18

## 2020-02-18 PROBLEM — R31.9 URINARY TRACT INFECTION WITH HEMATURIA: Status: RESOLVED | Noted: 2018-02-04 | Resolved: 2020-02-18

## 2020-02-18 LAB
ALBUMIN SERPL BCP-MCNC: 3 G/DL (ref 3.5–5.2)
ALBUMIN SERPL BCP-MCNC: 3 G/DL (ref 3.5–5.2)
ALP SERPL-CCNC: 188 U/L (ref 55–135)
ALP SERPL-CCNC: 193 U/L (ref 55–135)
ALT SERPL W/O P-5'-P-CCNC: 104 U/L (ref 10–44)
ALT SERPL W/O P-5'-P-CCNC: 105 U/L (ref 10–44)
ANION GAP SERPL CALC-SCNC: 10 MMOL/L (ref 8–16)
AST SERPL-CCNC: 355 U/L (ref 10–40)
AST SERPL-CCNC: 358 U/L (ref 10–40)
BACTERIA BLD CULT: NORMAL
BACTERIA BLD CULT: NORMAL
BASOPHILS # BLD AUTO: 0.01 K/UL (ref 0–0.2)
BASOPHILS NFR BLD: 0.1 % (ref 0–1.9)
BILIRUB DIRECT SERPL-MCNC: 1.1 MG/DL (ref 0.1–0.3)
BILIRUB DIRECT SERPL-MCNC: 1.2 MG/DL (ref 0.1–0.3)
BILIRUB SERPL-MCNC: 1.3 MG/DL (ref 0.1–1)
BILIRUB SERPL-MCNC: 1.3 MG/DL (ref 0.1–1)
BUN SERPL-MCNC: 11 MG/DL (ref 8–23)
CALCIUM SERPL-MCNC: 8.3 MG/DL (ref 8.7–10.5)
CHLORIDE SERPL-SCNC: 99 MMOL/L (ref 95–110)
CO2 SERPL-SCNC: 31 MMOL/L (ref 23–29)
CREAT SERPL-MCNC: 1.3 MG/DL (ref 0.5–1.4)
DIFFERENTIAL METHOD: ABNORMAL
EOSINOPHIL # BLD AUTO: 0.2 K/UL (ref 0–0.5)
EOSINOPHIL NFR BLD: 2.5 % (ref 0–8)
ERYTHROCYTE [DISTWIDTH] IN BLOOD BY AUTOMATED COUNT: 15.4 % (ref 11.5–14.5)
EST. GFR  (AFRICAN AMERICAN): 42 ML/MIN/1.73 M^2
EST. GFR  (NON AFRICAN AMERICAN): 37 ML/MIN/1.73 M^2
GLUCOSE SERPL-MCNC: 90 MG/DL (ref 70–110)
HCT VFR BLD AUTO: 27.9 % (ref 37–48.5)
HGB BLD-MCNC: 8.5 G/DL (ref 12–16)
IMM GRANULOCYTES # BLD AUTO: 0.14 K/UL (ref 0–0.04)
IMM GRANULOCYTES NFR BLD AUTO: 1.6 % (ref 0–0.5)
LYMPHOCYTES # BLD AUTO: 1 K/UL (ref 1–4.8)
LYMPHOCYTES NFR BLD: 11 % (ref 18–48)
MAGNESIUM SERPL-MCNC: 2 MG/DL (ref 1.6–2.6)
MCH RBC QN AUTO: 30.2 PG (ref 27–31)
MCHC RBC AUTO-ENTMCNC: 30.5 G/DL (ref 32–36)
MCV RBC AUTO: 99 FL (ref 82–98)
MONOCYTES # BLD AUTO: 0.8 K/UL (ref 0.3–1)
MONOCYTES NFR BLD: 8.6 % (ref 4–15)
NEUTROPHILS # BLD AUTO: 6.7 K/UL (ref 1.8–7.7)
NEUTROPHILS NFR BLD: 76.2 % (ref 38–73)
NRBC BLD-RTO: 0 /100 WBC
PHOSPHATE SERPL-MCNC: 3.2 MG/DL (ref 2.7–4.5)
PLATELET # BLD AUTO: 306 K/UL (ref 150–350)
PMV BLD AUTO: 9.4 FL (ref 9.2–12.9)
POTASSIUM SERPL-SCNC: 3.7 MMOL/L (ref 3.5–5.1)
PROT SERPL-MCNC: 6.1 G/DL (ref 6–8.4)
PROT SERPL-MCNC: 6.1 G/DL (ref 6–8.4)
RBC # BLD AUTO: 2.81 M/UL (ref 4–5.4)
SODIUM SERPL-SCNC: 140 MMOL/L (ref 136–145)
WBC # BLD AUTO: 8.81 K/UL (ref 3.9–12.7)

## 2020-02-18 PROCEDURE — 84100 ASSAY OF PHOSPHORUS: CPT | Mod: HCNC

## 2020-02-18 PROCEDURE — 25000003 PHARM REV CODE 250: Mod: HCNC | Performed by: HOSPITALIST

## 2020-02-18 PROCEDURE — 85025 COMPLETE CBC W/AUTO DIFF WBC: CPT | Mod: HCNC

## 2020-02-18 PROCEDURE — 36415 COLL VENOUS BLD VENIPUNCTURE: CPT | Mod: HCNC

## 2020-02-18 PROCEDURE — 83735 ASSAY OF MAGNESIUM: CPT | Mod: HCNC

## 2020-02-18 PROCEDURE — 97116 GAIT TRAINING THERAPY: CPT | Mod: HCNC,CQ

## 2020-02-18 PROCEDURE — 63600175 PHARM REV CODE 636 W HCPCS: Mod: HCNC | Performed by: HOSPITALIST

## 2020-02-18 PROCEDURE — 94761 N-INVAS EAR/PLS OXIMETRY MLT: CPT | Mod: HCNC

## 2020-02-18 PROCEDURE — 80076 HEPATIC FUNCTION PANEL: CPT | Mod: 91,HCNC

## 2020-02-18 PROCEDURE — 25000003 PHARM REV CODE 250: Mod: HCNC | Performed by: NURSE PRACTITIONER

## 2020-02-18 PROCEDURE — 97110 THERAPEUTIC EXERCISES: CPT | Mod: HCNC,CQ

## 2020-02-18 PROCEDURE — 80076 HEPATIC FUNCTION PANEL: CPT | Mod: HCNC

## 2020-02-18 PROCEDURE — 80048 BASIC METABOLIC PNL TOTAL CA: CPT | Mod: HCNC

## 2020-02-18 RX ORDER — DICYCLOMINE HYDROCHLORIDE 10 MG/1
10 CAPSULE ORAL 4 TIMES DAILY
Status: DISCONTINUED | OUTPATIENT
Start: 2020-02-18 | End: 2020-02-18 | Stop reason: HOSPADM

## 2020-02-18 RX ORDER — CEFAZOLIN SODIUM 2 G/50ML
2000 SOLUTION INTRAVENOUS EVERY 12 HOURS
Qty: 900 ML | Refills: 0
Start: 2020-02-18 | End: 2020-02-27

## 2020-02-18 RX ADMIN — DICYCLOMINE HYDROCHLORIDE 10 MG: 10 CAPSULE ORAL at 09:02

## 2020-02-18 RX ADMIN — AMIODARONE HYDROCHLORIDE 200 MG: 200 TABLET ORAL at 09:02

## 2020-02-18 RX ADMIN — PANTOPRAZOLE SODIUM 40 MG: 40 TABLET, DELAYED RELEASE ORAL at 09:02

## 2020-02-18 RX ADMIN — VENLAFAXINE HYDROCHLORIDE 37.5 MG: 37.5 CAPSULE, EXTENDED RELEASE ORAL at 09:02

## 2020-02-18 RX ADMIN — CEFAZOLIN SODIUM 2 G: 2 SOLUTION INTRAVENOUS at 11:02

## 2020-02-18 RX ADMIN — DICYCLOMINE HYDROCHLORIDE 10 MG: 10 CAPSULE ORAL at 12:02

## 2020-02-18 RX ADMIN — THERA TABS 1 TABLET: TAB at 09:02

## 2020-02-18 RX ADMIN — METOPROLOL TARTRATE 25 MG: 25 TABLET ORAL at 09:02

## 2020-02-18 RX ADMIN — OXYCODONE HYDROCHLORIDE AND ACETAMINOPHEN 1 TABLET: 5; 325 TABLET ORAL at 03:02

## 2020-02-18 RX ADMIN — LEVOTHYROXINE SODIUM 100 MCG: 100 TABLET ORAL at 05:02

## 2020-02-18 NOTE — DISCHARGE SUMMARY
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 She lives in [place], [place]. Her primary care physician is Dr. Israel Batista.    She presented to [facility] on 2/11/20 with generalized body aches associated with chest pain, abdominal pain, nausea and vomiting, intermittent dry nonproductive cough. She had a temperature of 100.2° Fahrenheit. Labs showed creatinine 1.6 mg/dL, increased from 1.37 on 6/8/18. She was admitted to [facility].    Procedure(s) (LRB):  ULTRASOUND, UPPER GI TRACT, ENDOSCOPIC (N/A)  ERCP (ENDOSCOPIC RETROGRADE CHOLANGIOPANCREATOGRAPHY) (N/A)      Hospital Course:   Blood cultures taken on admission grew pan-sensitive Klebsiella pneumoniae, treated with cefazolin. Abdomen CT showed no etiology. Infectious Disease recommended consulting Gastroenterology, who performed ERCP with biliary sphincterotomy and balloon extraction of choledocholithiasis. Infectious Disease recommended continuing cefazolin until 2/27/20. AST and ALT increased to 355 and 105 on 2/18/20. A PICC was placed and she was discharged with home health. Labs will be checked by home  health.     Consults:   Consults (From admission, onward)        Status Ordering Provider     Inpatient consult to G[facility]enterology[facility]  Once     Provider:  (Not yet assigned)    Completed EVI DAN     Inpatient consult to Infectious Diseases  Once     Provider:  Zoila Crews MD    Completed CARIE RODRÍGUEZ     Inpatient consult to PICC team ([facility])  Once     Provider:  (Not yet assigned)    Acknowledged JOSE RAMON LIMA consult to case management  Once     Provider:  (Not yet assigned)    Acknowledged INNOCENT-MIREYA NICHOLSON        Final Active Diagnoses:    Diagnosis Date Noted POA    PRINCIPAL PROBLEM:  Sepsis due to Klebsiella pneumoniae [A41.4] 02/12/2020 Yes    Transaminitis [R74.0] 02/18/2020 No    Anemia of chronic disease [D63.8] 03/27/2018 Yes     Chronic    Chronic anticoagulation [Z79.01] 03/27/2018 Not Applicable     Chronic    GERD (gastroesophageal reflux disease) [K21.9] 03/27/2018 Yes     Chronic    CAD (coronary artery disease) [I25.10] 02/02/2018 Yes     Chronic    CKD (chronic kidney disease) stage 3, GFR 30-59 ml/min [N18.3] 12/27/2017 Yes     Chronic    Chronic diastolic heart failure [I50.32] 12/14/2017 Yes     Chronic    Paroxysmal atrial fibrillation [I48.0] 12/14/2017 Yes     Chronic    Hyperlipidemia [E78.5] 04/19/2017 Yes     Chronic    Depression [F32.9] 03/03/2017 Yes     Chronic    Essential hypertension [I10] 07/30/2014 Yes     Chronic      Problems Resolved During this Admission:    Diagnosis Date Noted Date Resolved POA    Diarrhea [R19.7] 02/18/2020 02/18/2020 No    Choledocholithiasis [K80.50] 02/16/2020 02/18/2020 Yes    Acute kidney injury superimposed on chronic kidney disease [N17.9, N18.9] 02/12/2020 02/18/2020 Yes    Sepsis [A41.9] 02/12/2020 02/18/2020 Yes       Discharged Condition: good    Disposition: Home-Health Care Svc    Follow Up:  Follow-up Information     Israel Batista MD.    Specialty:  General Practice  Contact  information:  [address] [facility]  [place] [address]  [phone]                 Patient Instructions:      Diet Cardiac     Notify your health care provider if you experience any of the following:  persistent nausea and vomiting or diarrhea     Notify your health care provider if you experience any of the following:  redness, tenderness, or signs of infection (pain, swelling, redness, odor or green/yellow discharge around incision site)     SUBSEQUENT HOME HEALTH ORDERS   Order Comments: Subsequent Home Health Orders    Current Medications:  Current Facility-Administered Medications:  acetaminophen tablet 650 mg, 650 mg, Oral, Q6H PRN, Barbara Holguin NP, 650 mg at 02/13/20 0830  albuterol-ipratropium 2.5 mg-0.5 mg/3 mL nebulizer solution 3 mL, 3 mL, Nebulization, Q4H PRN, Barbara Holguin NP  amiodarone tablet 200 mg, 200 mg, Oral, Daily, Michelle Gallegos NP, 200 mg at 02/17/20 0918  cefazolin (ANCEF) 2 gram in dextrose 5% 50 mL IVPB (premix), 2 g, Intravenous, Q12H, Ishan Lora MD, Last Rate: 100 mL/hr at 02/17/20 2223, 2 g at 02/17/20 2223  dicyclomine capsule 10 mg, 10 mg, Oral, QID, Barbara Holguin NP  furosemide tablet 40 mg, 40 mg, Oral, Every other day, Michelle Gallegos NP, 40 mg at 02/17/20 0918  hydrALAZINE injection 10 mg, 10 mg, Intravenous, Q8H PRN, Yoon Calvin MD, 10 mg at 02/17/20 1359  levothyroxine tablet 100 mcg, 100 mcg, Oral, Before breakfast, Michelle Gallegos NP, 100 mcg at 02/18/20 0553  melatonin tablet 6 mg, 6 mg, Oral, Nightly PRN, Barbara Holguin NP, 6 mg at 02/16/20 2118  metoprolol tartrate (LOPRESSOR) tablet 25 mg, 25 mg, Oral, BID, Michelle Gallegos NP, 25 mg at 02/17/20 2223  multivitamin tablet, 1 tablet, Oral, Daily, Michelle Gallegos NP, 1 tablet at 02/17/20 0918  ondansetron injection 4 mg, 4 mg, Intravenous, Q6H PRN, Barbara Holguin NP, 4 mg at 02/15/20 0929  oxyCODONE-acetaminophen 5-325 mg per tablet  1 tablet, 1 tablet, Oral, Q8H PRN, Ishan Lora MD, 1 tablet at 02/18/20 0340  pantoprazole EC tablet 40 mg, 40 mg, Oral, Daily, Michelle Gallegos NP, 40 mg at 02/17/20 0918  rosuvastatin tablet 10 mg, 10 mg, Oral, QHS, Michelle Gallegos NP, 10 mg at 02/17/20 2223  senna-docusate 8.6-50 mg per tablet 1 tablet, 1 tablet, Oral, QHS, Michelle Gallegos NP, 1 tablet at 02/17/20 2223  sodium chloride 0.9% flush 10 mL, 10 mL, Intravenous, PRN, Alejandra Montgomery MD  traMADol tablet 50 mg, 50 mg, Oral, Q8H PRN, Barbara Holguin NP, 50 mg at 02/17/20 2223  venlafaxine 24 hr capsule 37.5 mg, 37.5 mg, Oral, Daily, Michelle Gallegos NP, 37.5 mg at 02/17/20 0918        Nursing:   Home Infusion Therapy:   SN to perform Infusion Therapy/Central Line Care.  Review Central Line Care & Central Line Flush with patient.    Administer (drug and dose): cefazolin 2 grams every 12 hours until 2/27/20 via PICC    Last dose given: 2/18/20                         Home dose due: 2/19/20    Scrub the Hub: Prior to accessing the line, always perform a 30 second alcohol scrub  Each lumen of the central line is to be flushed at least daily with 10 mL Normal Saline and 3 mL [unfilled]  Skilled Nurse (SN) may draw blood from IV access  Blood Draw Procedure:   - Aspirate at least 5 mL of blood   - Discard   - Obtain specimen   - Change injection cap   - Flush with 20 mL Normal Saline followed by a                 3-5 mL [unfilled]  Central :   - Sterile dressing changes are done weekly and as needed.   - [unfilled]   - Injection caps are changed weekly and after EVERY lab draw.   - If sterile gauze is under dressing to control oozing,                 dressing change must be performed every 24 hours until gauze is not needed.    Remove PICC after last dose of cefazolin.    Diet:   cardiac diet    Activities:   activity as tolerated    Labs:  none    Referrals/ Consults  Aide to provide assistance with personal  care, ADLs, and vital signs.  Physical Therapy to evaluate and treat.  Occupational Therapy to evaluate and treat.    Home Health Aide:  Nursing Three times weekly    Physical Therapy three times weekly    Occupational Therapy three times weekly     Order Specific Question Answer Comments   What Home Health Agency is the patient currently using? [facility]      Activity as tolerated       Significant Diagnostic Studies:   X-Ray Chest AP Portable 2/11/20: FINDINGS:  Cardiac silhouette is stable in size.  Left-sided pacer device is seen.  Lungs are symmetrically expanded.  Mild coarse interstitial lung changes are seen.  No evidence of focal consolidative process, pneumothorax, or significant effusion.  No acute osseous abnormality identified.  X-Ray Knee 3 View Right 2/12/20:  FINDINGS:  There is subtle cortical irregularity with nonspecific linear lucency seen involving the medial femoral condyle.  Findings are unable to be confirmed on lateral and Merchant views and may be projectional, however difficult to completely exclude nondisplaced fracture if clinical history of recent trauma.  CT follow-up may be obtained if clinically indicated.  Otherwise no acute displaced fracture or dislocation seen.  Degenerative changes and joint space narrowing are seen involving the medial and lateral tibiofemoral compartments.  Patellofemoral compartment joint space appears fairly well maintained.  No significant suprapatellar joint effusion.  Vascular calcifications are noted.  CT Abdomen Pelvis With Contrast 2/14/20:  FINDINGS:  Bilateral small pleural effusion.  No liver lesions.  Cholecystectomy clips.  Slight prominence of the common bile duct measuring 13 mm, no definite radiopaque stone noted within the common bile duct.  The intrahepatic biliary ducts appear within normal limits.  The stomach, pancreas, spleen, adrenal glands and bilateral kidneys appear within normal limits.  The abdominal aorta tapers normally,  there is significant atherosclerotic plaque of the aorta, no para-aortic lymphadenopathy.  No bowel dilation, mild stool retention.  There are several sigmoid diverticula.  The appendix is not definitely visualized, there is no definite inflammatory process in the right lower abdominal quadrant.  The bladder appears normal.  The uterus is present.  The ovaries are not enlarged.  No free air, no free fluid.  Postoperative changes of the right femoral neck.  S2 segment remote fracture with bilateral sacral ala fracture .  The patient has age-indeterminate mild to moderate compression fracture deformity of L1, L2 and L3 involving more so the superior endplate.  Impression:  Bilateral small pleural effusion.  Mild prominence of the common bile duct, no definite obstructive changes seen, this can be seen post cholecystectomy, to correlate with liver enzymes.  Cholecystectomy.  Sigmoid diverticulosis.  Extensive atherosclerotic plaque of the aorta and iliac vessels.  Age-indeterminate fracture at L1, L2, and L3, remote fracture of the S2 segment.  Remote fracture of the right femoral neck region.  Transthoracic echo complete 2/17/20:  · Normal left ventricular systolic function. The estimated ejection fraction is 65%.  · Grade II (moderate) left ventricular diastolic dysfunction consistent with pseudonormalization.  · Normal right ventricular systolic function.  · Moderate left atrial enlargement.  · Normal central venous pressure (3 mmHg).  · The estimated PA systolic pressure is 50 mmHg.  · Pulmonary hypertension present.  · Moderate aortic regurgitation.  · Mild to moderate tricuspid regurgitation.  Upper EUS 2/17/20:   Findings:       ENDOSONOGRAPHIC FINDING: :       The esophagus, stomach and duodenum were visualized        endosonographically.       There was no sign of significant endosonographic abnormality involving the celiac trunk.       No lymphadenopathy seen.       There was no sign of significant  endosonographic abnormality in the  entire pancreas.       An anechoic lesion suggestive of a cyst was identified in the pancreatic body. The lesion measured 5 mm by 5 mm in maximal cross-sectional diameter. There was a single compartment without septae. The outer wall of the lesion was not seen. There was no internal debris within the fluid-filled cavity.       There was dilation in the common bile duct which measured up to 13 mm.       One stone was visualized endosonographically in the common bile duct. The stone measured 4 mm in greatest dimension. It was hyperechoic and characterized by shadowing.  Impression:           - The celiac trunk was endosonographically normal.                        - There was no sign of significant pathology in the entire pancreas.                        - A cystic lesion was seen in the pancreatic body.                        - There was dilation in the common bile duct which measured up to 13 mm.                        - One small stone was visualized endosonographically in the common bile duct.                        - No specimens collected.  ERCP 2/17/20:  Findings:       A  film of the abdomen was obtained. Surgical clips, consistent with a previous cholecystectomy, were seen in the area of the right upper quadrant of the abdomen. The esophagus was successfully intubated under direct vision. The scope was advanced to a normal major papilla in the descending duodenum without detailed examination of the pharynx, larynx and associated structures, and upper GI tract. The upper GI tract was grossly normal. The bile duct was deeply cannulated with the Dreamtome sphincterotome. Contrast was injected. I personally interpreted the bile duct images. Image quality was adequate. Contrast extended to the entire biliary tree.        The lower third of the main bile duct contained filling defect(s) thought to be sludge. A long 0.025 inch Jagwire was passed into the biliary tree. An 8 mm  biliary sphincterotomy was made with a traction (standard) sphincterotome using ERBE electrocautery. There was no post-sphincterotomy bleeding. The biliary tree was swept with a 15 mm balloon starting at the bifurcation. Sludge was swept from the duct. One stone was removed. No stones remained. The total fluoroscopy exposure time was 3 minutes.  Impression:           - Choledocholithiasis was found. Complete removal was accomplished by biliary sphincterotomy and balloon extraction.                        - No purulence.  Recommendation:       - [facility].                        - Resume previous diet.                        - Continue present medications.                        - Return to referring physician.                        - HOLD Eliquis for 2 days.     Medications:  Reconciled Home Medications:      Medication List      START taking these medications    ceFAZolin 2 g/50mL Dextrose IVPB 2 gram/50 mL Pgbk  Commonly known as:  ANCEF  Inject 50 mLs (2,000 mg total) into the vein every 12 (twelve) hours. End date 2/27/20. for 9 days        CHANGE how you take these medications    levothyroxine 25 MCG tablet  Commonly known as:  SYNTHROID  Take 1 tablet (25 mcg total) by mouth before breakfast.  What changed:  how much to take        CONTINUE taking these medications    albuterol 90 mcg/actuation inhaler  Commonly known as:  PROVENTIL/VENTOLIN HFA  Inhale 2 puffs into the lungs every 6 (six) hours as needed for Wheezing. Rescue     amiodarone 200 MG Tab  Commonly known as:  PACERONE  Take 1 tablet (200 mg total) by mouth once daily.     CALTRATE + D3 PLUS MINERALS ORAL  Take 1 tablet by mouth once daily.     Eliquis 5 mg Tab  Generic drug:  apixaban  Take 5 mg by mouth 2 (two) times daily.     furosemide 40 MG tablet  Commonly known as:  LASIX  Take 40 mg by mouth every other day.     gemfibrozil 600 MG tablet  Commonly known as:  LOPID  Take 300 mg by mouth. Take 1/2  tablet QHS     losartan 25 MG tablet  Commonly known as:  COZAAR  Take 1 tablet (25 mg total) by mouth every evening.     metoprolol tartrate 25 MG tablet  Commonly known as:  LOPRESSOR  Take 1 tablet (25 mg total) by mouth 2 (two) times daily.     multivitamin per tablet  Commonly known as:  THERAGRAN  Take 1 tablet by mouth once daily.     omeprazole 20 MG capsule  Commonly known as:  PRILOSEC  Take 20 mg by mouth daily as needed.     oxyCODONE-acetaminophen 5-325 mg per tablet  Commonly known as:  PERCOCET  Take 1 tablet by mouth every 4 (four) hours as needed (pain 5-10/10 pain scale).     polycarbophil 625 mg tablet  Commonly known as:  FIBERCON  Take 625 mg by mouth once daily.     rosuvastatin 10 MG tablet  Commonly known as:  CRESTOR  Take 10 mg by mouth every evening.     Senna with Docusate Sodium 8.6-50 mg per tablet  Generic drug:  senna-docusate 8.6-50 mg  Take 1 tablet by mouth every evening.     venlafaxine 37.5 MG 24 hr capsule  Commonly known as:  EFFEXOR-XR  TAKE 1 CAPSULE (37.5 MG TOTAL) BY MOUTH ONCE DAILY.            Indwelling Lines/Drains at time of discharge:   Lines/Drains/Airways     PICC                 Time spent on the discharge of patient: 35 minutes  Patient was seen and examined on the date of discharge and determined to be suitable for discharge.         Russell Chambers MD  Department of Hospital Medicine  [facility]

## 2020-02-18 NOTE — ASSESSMENT & PLAN NOTE
CKD (chronic kidney disease) stage 3, GFR 30-59 ml/min  Creatinine stable at her baseline Cr 1.3  Avoid nephrotoxic meds

## 2020-02-18 NOTE — NURSING
Pt and daughter given AVS packet. New medication reviewed with name, dose, purpose frequency and side effects. All home medication reviewed with last dose given and next dose due. bio script up to bedside and showed family how to use Picc line and care of picc line. Family with no questions. Per Omar with Bio script next dose of Iv medication will be delivered to the house. Daughter aware without any questions. Pt and family with no questions at this time. Pt down per nurse.

## 2020-02-18 NOTE — ASSESSMENT & PLAN NOTE
Hyperlipidemia   Paroxysmal atrial fibrillation  Chronic anticoagulation  CAD (coronary artery disease)  Chronic diastolic heart failure  Tachy-tejal syndrome s/p pacemaker placement     Continue amiodarone, furosemide and metoprolol. Eliquis held due to EUS today.    Continue statin  Telemetry monitoring.

## 2020-02-18 NOTE — ASSESSMENT & PLAN NOTE
Klebsiella pneumoniae bacteremia  RUQ abdominal pain     Afebrile. CXR negative. UA negative. Influenza negative.   WBC count remains WNL.   LFTs are back to WNL.   Continue cefazolin 2 gram IV BID  Appreciate ID evaluation. TTE done today and without evidence of vegetations.   Appreciate GI assistance and planning for EUS today +/- ERCP depending on the findings.

## 2020-02-18 NOTE — PLAN OF CARE
Home Health referrals sent to [facility], and [facility]. Home infustion referral sent to [facility].       02/18/20 0987   Post-Acute Status   Post-Acute Authorization Home Health/Hospice   Home Health/Hospice Status Referrals Sent

## 2020-02-18 NOTE — PROGRESS NOTES
[facility] Infectious Disease Progress Note     Primary Team:   Consultant Attending: Dr. Crews  Date of Admit: 2/11/2020    Summary of history     89 y/o female with HTN, HLD, CHF, a fib, long term anticoagulant use, CAD, CKD3, anemia of chronic disease, tachy tejal syndrome, Pacemaker placement, chronic neck pain, chronic back pain, depression; [place] [facility] [place] with chest pain, back pain, bilateral shoulder pain, nausea and vomiting X 1. Had dry cough, low grade fever in the ED to 100.2. She was admitted and started on vanco and zosyn empirically. BC on 2/11 one of 2 sets positive for GNR - klebsiella pneumoniae sensitive to all. Flu swab on admit negative; Repeat BC on 2/13 NGTD. UA on admit 2/11 was negative except for trace protein. UA on 2/14 was negative except for trace blood. Portable CXR on 2/11 was negative for acute process. Right knee x ray 2/12 positive for subtle cortical irregularity with nonspecific linear lucency seen involving the medial femoral condyle, degenerative changes and no significant joint effusion seen. CT abdomen and pelvis done on 2/14 positive for bilateral small pleural effusions, s/p cholecystectomy, mild prominence of the common bile duct no definite obstruction, sigmoid diverticulosis, some fractures of L1, L2 and L3 (age indeterminate) remote fracture S2 and right femoral neck. 2/14 the vanco and zosyn were changed to cefazolin. ID consulted 2/15 for help with antibiotics and to figure out source for the klebsiella bacteremia. Patient underwent an ERCP on 2/17 which showed choledocholithiais and has done well post procedure.     Interval events     In the interim patient remains afebrile with no leukocytosis. She was noted to have an elevation in her alk phos, bilirubin, AST and ALT today.    Subjective     Patient appears to resting comfortably and has no complaints. She denies any fevers, chills, abdominal pain, nausea, or vomiting.    Current Medications:      Infusions:       Scheduled:   amiodarone  200 mg Oral Daily    ceFAZolin (ANCEF) IVPB  2 g Intravenous Q12H    dicyclomine  10 mg Oral QID    furosemide  40 mg Oral Every other day    levothyroxine  100 mcg Oral Before breakfast    metoprolol tartrate  25 mg Oral BID    multivitamin  1 tablet Oral Daily    pantoprazole  40 mg Oral Daily    rosuvastatin  10 mg Oral QHS    senna-docusate 8.6-50 mg  1 tablet Oral QHS    venlafaxine  37.5 mg Oral Daily        PRN:  acetaminophen, albuterol-ipratropium, hydrALAZINE, melatonin, ondansetron, oxyCODONE-acetaminophen, sodium chloride 0.9%, traMADol    Antibiotics and Day Number of Therapy:  Antibiotics (From admission, onward)    Start     Stop Route Frequency Ordered    20 1915  cefazolin (ANCEF) 2 gram in dextrose 5% 50 mL IVPB (premix)      -- IV Every 12 hours (non-standard times) 20 1800          Objective   Last 24 Hour Vital Signs:  BP  Min: 138/60  Max: 187/81  Temp  Av °F (36.7 °C)  Min: 97.8 °F (36.6 °C)  Max: 98.2 °F (36.8 °C)  Pulse  Av.8  Min: 60  Max: 66  Resp  Av.6  Min: 16  Max: 20  SpO2  Av.2 %  Min: 92 %  Max: 94 %  Weight  Av kg (165 lb 5.5 oz)  Min: 75 kg (165 lb 5.5 oz)  Max: 75 kg (165 lb 5.5 oz)    Lines, drains, airway:  Left hand peripheral IV    Physical Examination:    Examination  General: elderly and hard of hearing, comfortable   HEENT: normal oral mucosa, normal dentition, conjunctiva normal, pupils normal, extraocular motion normal  Neck: no thyromegaly, no JVD   Cardiac: no murmurs, pulse regular    Pulmonary/Chest: chest clear, no respiratory distress   GI/Rectal: no organomegaly, no masses, non tender, normal bowel sounds, rectal exam deferred   : deferred   Msk: normal motor screening exam  Vascular: normal peripheral perfusion   Lymph nodes: none palpated  Skin/ Extremities: no rash, no pedal edema, no ulceration    Neurology/ Mental status: alert oriented    Lab data:  CBC:   Lab Results    Component Value Date    WBC 8.81 02/18/2020    HGB 8.5 (L) 02/18/2020     02/18/2020    MCV 99 (H) 02/18/2020    RDW 15.4 (H) 02/18/2020       Estimated Creatinine Clearance: 28.4 mL/min (based on SCr of 1.3 mg/dL).    Microbiology Data  Microbiology Results (last 7 days)     Procedure Component Value Units Date/Time    Clostridium difficile EIA [[id number]]     Order Status:  No result Specimen:  Stool     Blood culture [[id number]] Collected:  02/13/20 1040    Order Status:  Completed Specimen:  Blood Updated:  02/17/20 1612     Blood Culture, Routine No Growth to date      No Growth to date      No Growth to date      No Growth to date      No Growth to date    Blood culture [[id number]] Collected:  02/13/20 1049    Order Status:  Completed Specimen:  Blood Updated:  02/17/20 1612     Blood Culture, Routine No Growth to date      No Growth to date      No Growth to date      No Growth to date      No Growth to date    Narrative:       Collection has been rescheduled by EML at 02/13/2020 10:42 Reason:   unable to collect second set nurse notified   Collection has been rescheduled by EML at 02/13/2020 10:42 Reason:   unable to collect second set nurse notified     Blood culture x two cultures. Draw prior to antibiotics. [[id number]] Collected:  02/11/20 1946    Order Status:  Completed Specimen:  Blood from Peripheral, Antecubital, Right Updated:  02/16/20 2312     Blood Culture, Routine No growth after 5 days.    Narrative:       Aerobic and anaerobic    Blood culture x two cultures. Draw prior to antibiotics. [[id number]]  (Abnormal)  (Susceptibility) Collected:  02/11/20 1945    Order Status:  Completed Specimen:  Blood from Peripheral, Antecubital, Left Updated:  02/14/20 1445     Blood Culture, Routine Gram stain aer bottle: Gram negative rods       Results called to and read back by: Katelin Ramachandran RN  02/12/2020        11:41      KLEBSIELLA PNEUMONIAE    Narrative:       Aerobic and anaerobic     Influenza A & B by Molecular [[id number]] Collected:  02/11/20 1946    Order Status:  Completed Specimen:  Nasopharyngeal Swab Updated:  02/11/20 2022     Influenza A, Molecular Negative     Influenza B, Molecular Negative     Flu A & B Source Nasal swab          Assessment     87 y/o female with HTN, HLD, CHF, a fib, Carotid artery disease, CKD3, anemia, tachy tejal, s/p pacemaker, chronic neck pain and back pain presented to [facility] 2[place] with body aches, nausea vomiting and diarrhea with low grade fever 100.2 in ED. Was admitted and BC on admit positive for klebsiella pneumoniae sensitive to all. Patient was originally on vanco and zosyn and was streamlined to cefazolin on 2/14. Patient with abdominal pain. Ct abdomen and pelvis - s/p cholecystectomy and slight dilation of common bile duct, sigmoid diverticulosis, bilateral small pleural effusions. Patient underwent an ERCP on 2/17 which showed choledocholithiasis, stone removal was performed. Today she appears to have elevation in her AST, ALT, bilirubin and Alk Phos, this may be a transient consequence of her procedure, recommend continuing current regimen and and having home health repeat a CMP in several days. Patient appears to be getting ready for discharge.       Recommendations     Klebsiella pneumoniae bacteremia     Continue cefazolin 2 grams IVPB every 12 h as is for now (cr cl around 24.6)  Recommend continuing this antibiotic until 2/27 to complete a 2 week course from culture clearance  TTE performed on 2/17 with no suggestion of vegetation   ERCP showed choledocholithiasis which is the likely source of the bacteremia  Please obtain CMP and CBC in 1 week. Please have the labs faxed to Dr. Loomis at [phone]    Transamitis  Recommend following up on CMP repeat to ensure resolution      Thank you for this consult. We will follow.    Blane Cox  [facility] ID Fellow

## 2020-02-18 NOTE — PROGRESS NOTES
See Flowsheet for Right PICC insertion, pt tolerated procedure well.  CXR released for placment of PICC.      02/18/20 1100   Pre-Procedure Time-out   Procedure to be Performed PICC   Correct Patient Yes   Correct Site Yes   Correct Procedure Yes   Correct Position Yes   Correct Laterality Right   H&P Note Verified Yes   Surgery consent verified Yes   Surgery consent procedure name PICC   Anesthesia consent verified N/A   Blood consent verified  N/A   Pre-Op/Pre-Procedure orders verified N/A   Other Documents Verified N/A   Radiology Studies Verified N/A   Relevant Lab Results Available N/A   Required Blood Products, Implants, Devices and /or Special Equipment Available N/A   Site Marked Yes   Site Marked by Whom Garret Gooden RN   Site Sreekanth Visible Yes   Site Sreekanth Location Right   Pre-Procedural Time-Out   Allergies Reviewed Done   Hold tube feeding N/A   Verify House Officer Privileges Done   Verify consent form completed & in chart Done   Perform patient ID X's [id number] Done   Announce the procedure to be performed Done   Sreekanth/Assess site Done   Position patient  Done   Assemble equipment/verify supplies Done   Verify all medication & syringes are labeled Done   Confirm that all persons in room cleanse hands? (ASK, if unsure) Done   Prep Procedure site (N/A for intubations) N/A   Use large drape to cover patient? (N/A for intubations) Done   Set pulse & oximetry alarms to audible; If on ventilator, place on 100% FiO2 N/A   Procedure Provider:Wear sterile gloves, hat, mask with eye shield and sterile gown Done   Procedure Assistant:Wear sterile gloves, hat, mask with eye shield and sterile gown Done   All persons in the room wearing a mask and hat Done   Ultrasound guidance Done   Trendelenberg position for upper torso line N/A   Use chlorhexidine impregnated disk Done   Date Dressing  Done   Sterile field maintained throughout procedure including dressing application Done   Position confirmation (Fluoroscopy OR Chest  X-ray ordered and verified) Done   Hand and Barrier   Hand Hygiene Performed   Barrier Precautions Performed   Skin Antisepsis ChloraPrep   Pre-Incision Time-out   Procedure to be Performed PICC   Correct Patient Yes   Correct Site Yes   Correct Procedure Yes   Correct Position Yes   Correct Laterality Right   All Members of the Procedure/Surgical Team Introduced Yes   All Procedure/Surgical Team Members Present Yes   All Procedure/Surgical Team Members Agreed to Proceed with the Case Yes   Antibiotics Ordered/Given N/A   Allergies Reviewed Yes   Required Blood Products, Implants, Devices and /or Special Equipment Available N/A   Site Sreekanth Visible Yes

## 2020-02-18 NOTE — PT/OT/SLP PROGRESS
Physical Therapy Treatment    Patient Name:  Katherine Hernandez   MRN:  [id number]    Recommendations:     Discharge Recommendations:  home   Discharge Equipment Recommendations: none   Barriers to discharge: decreased mobility,strength and endurance    Assessment:     Katherine Hernandez is a 88 y.o. female admitted with a medical diagnosis of Sepsis due to Klebsiella pneumoniae.  She presents with the following impairments/functional limitations:  weakness, impaired endurance, impaired functional mobilty, gait instability, impaired balance, decreased lower extremity function, pain, decreased ROM, impaired coordination,pt with improved status and requires Min A with all mobility,pt has assistance of daughter at home and will benefit from  services upon discharge.    Rehab Prognosis: Good; patient would benefit from acute skilled PT services to address these deficits and reach maximum level of function.    Recent Surgery: Procedure(s) (LRB):  ULTRASOUND, UPPER GI TRACT, ENDOSCOPIC (N/A)  ERCP (ENDOSCOPIC RETROGRADE CHOLANGIOPANCREATOGRAPHY) (N/A) 1 Day Post-Op    Plan:     During this hospitalization, patient to be seen 5 x/week to address the identified rehab impairments via gait training, therapeutic activities, therapeutic exercises, neuromuscular re-education, wheelchair management/training and progress toward the following goals:    · Plan of Care Expires:  03/12/20    Subjective     Chief Complaint: n/a  Patient/Family Comments/goals: pt may be leaving today.  Pain/Comfort:  · Pain Rating 1: (no rating)  · Location - Side 1: Right  · Location - Orientation 1: generalized  · Location 1: knee(with flexion)  · Pain Addressed 1: Reposition, Distraction      Objective:     Communicated with nsg prior to session.  Patient found supine with bed alarm, telemetry upon PT entry to room.     General Precautions: Standard, aspiration, hearing impaired   Orthopedic Precautions:N/A   Braces: N/A     Functional Mobility:  · Bed  Mobility:     · Supine to Sit: minimum assistance  · Sit to Supine: minimum assistance, moderate assistance and at le's  · Transfers:     · Sit to Stand:  contact guard assistance and minimum assistance with rolling walker  · Gait: amb ~15' X 1 with RW and Min A  · Balance: fair standing balance with RW      AM-PAC 6 CLICK MOBILITY  Turning over in bed (including adjusting bedclothes, sheets and blankets)?: 3  Sitting down on and standing up from a chair with arms (e.g., wheelchair, bedside commode, etc.): 3  Moving from lying on back to sitting on the side of the bed?: 3  Moving to and from a bed to a chair (including a wheelchair)?: 3  Need to walk in hospital room?: 3  Climbing 3-5 steps with a railing?: 1  Basic Mobility Total Score: 16       Therapeutic Activities and Exercises: le supine ex's X 15 reps inc: ap,qs,hs,abd/add,slr with CGA.       Patient left supine with all lines intact, call button in reach, bed alarm on and daughter present..    GOALS: see general POC  Multidisciplinary Problems     Physical Therapy Goals        Problem: Physical Therapy Goal    Goal Priority Disciplines Outcome Goal Variances Interventions   Physical Therapy Goal     PT, PT/OT Ongoing, Progressing     Description:  1. Ambulate 40' with RW with CG  2.  Supine to/from sit with supervision  3.  Bed to/from chair with CG with RW  4.  Up in chair 2 hours                      Time Tracking:     PT Received On: 02/18/20  PT Start Time: 0937     PT Stop Time: 1003  PT Total Time (min): 26 min     Billable Minutes: Gait Training 14 and Therapeutic Exercise 12    Treatment Type: Treatment  PT/PTA: PTA     PTA Visit Number: 4     Franklyn Vaca, SHEILA  02/18/2020

## 2020-02-18 NOTE — PROGRESS NOTES
[facility] Medicine  Progress Note    Patient Name: Katherine Heranndez  MRN: [id number]  Patient Class: IP- Inpatient   Admission Date: 2/11/2020  Length of Stay: 5 days  Attending Physician: Ishan Lora MD  Primary Care Provider: Israel Batista MD        Subjective:     Principal Problem:Sepsis due to Klebsiella pneumoniae        HPI:  Katherine Hernandez is an 89 yo female with HTN, HLD, chronic diastolic heart failure, paroxysmal atrial fibrillation, long tern anticoagulation use, carotid artery disease, chronic kidney disease stage 3, anemia of chronic disease, tachy-tejal syndrome, s/p pacemaker placement, chronic neck pain, chronic back pain and depression who presented to [facility] ED 2/11/20 with complaint of generalized body aches associated with chest pain, abdominal pain and n/v x 1. Family at bedside also reports intermittent dry nonproductive cough. No hematemesis or melena. Pt with low grade fever (Tmax 100.2) in ED. Initial labs remarkable for H/H 9.5/31, BUN/Cr 27/1.6 (previously 22/1.3). Troponin negative. No acute changes on telemetry. Influenza negative. CXR negative. She received Vancomycin/Zosyn and 2 liters normal saline. Patient admitted to [facility].     Overview/Hospital Course:  Blood cultures collected 2/11 positive for gram negative rods. Repeat cultures 2/13 pending. Pt afebrile. She reports lower abdominal/pelvic pain on exam. Nausea/vomiting resolved. CT abdomen did not really show any acute findings that could be source of bacteremia. Blood cultures resulted and pan-sensitive Klebsiella pneumoniae, so zosyn was transitioned to cefazolin. ID consulted and recommended GI consult due to persistent RUQ abdominal pain     Interval History: Feeling a little better this AM. Ate more yesterday. Still with some mild abdominal pain. Ready for the procedure today.     Review of Systems   Constitutional: Negative for chills and fever.    Respiratory: Negative for cough and shortness of breath.    Cardiovascular: Negative for chest pain and palpitations.   Gastrointestinal: Positive for nausea. Negative for vomiting.     Objective:     Vital Signs (Most Recent):  Temp: 97.9 °F (36.6 °C) (02/17/20 1540)  Pulse: 60 (02/17/20 1540)  Resp: 18 (02/17/20 1540)  BP: 138/60 (02/17/20 1540)  SpO2: (!) 94 % (02/17/20 1320) Vital Signs (24h Range):  Temp:  [97.6 °F (36.4 °C)-98.4 °F (36.9 °C)] 97.9 °F (36.6 °C)  Pulse:  [59-66] 60  Resp:  [15-19] 18  SpO2:  [90 %-100 %] 94 %  BP: (138-187)/(60-81) 138/60     Weight: 75.8 kg (167 lb)  Body mass index is 30.54 kg/m².    Intake/Output Summary (Last 24 hours) at 2/17/2020 1801  Last data filed at 2/17/2020 1700  Gross per 24 hour   Intake 450 ml   Output 750 ml   Net -300 ml      Physical Exam   Constitutional: She is oriented to person, place, and time. She appears well-developed and well-nourished. No distress.   Cardiovascular: Normal rate and regular rhythm.   Murmur heard.  Pulmonary/Chest: Effort normal and breath sounds normal. No respiratory distress. She has no wheezes.   Abdominal: Soft. Bowel sounds are normal. She exhibits no distension. There is tenderness in the right upper quadrant.   Musculoskeletal: She exhibits no edema.   Neurological: She is alert and oriented to person, place, and time.   Skin: Skin is warm and dry.   Psychiatric: She has a normal mood and affect. Her behavior is normal.   Nursing note and vitals reviewed.      Significant Labs:   CBC:   Recent Labs   Lab 02/16/20  0549 02/17/20  0511   WBC 5.53 5.83   HGB 8.5* 8.3*   HCT 27.3* 26.6*    273     CMP:   Recent Labs   Lab 02/16/20  0549 02/17/20  0511    138   K 2.8* 3.0*   CL 99 100   CO2 29 29   GLU 81 86   BUN 10 8   CREATININE 1.4 1.3   CALCIUM 8.0* 8.0*   PROT 6.0 5.8*   ALBUMIN 2.9* 2.8*   BILITOT 0.3 0.3   ALKPHOS 68 65   AST 36 29   ALT 24 10   ANIONGAP 10 9   EGFRNONAA 34* 37*     Magnesium:   Recent Labs    Lab 02/16/20  0549 02/17/20  0511   MG 2.0 2.1       Significant Imaging: I have reviewed all pertinent imaging results/findings within the past 24 hours.      Assessment/Plan:      * Sepsis due to Klebsiella pneumoniae  Klebsiella pneumoniae bacteremia  RUQ abdominal pain     Afebrile. CXR negative. UA negative. Influenza negative.   WBC count remains WNL.   LFTs are back to WNL.   Continue cefazolin 2 gram IV BID  Appreciate ID evaluation. TTE done today and without evidence of vegetations.   Appreciate GI assistance and planning for EUS today +/- ERCP depending on the findings.       Acute kidney injury superimposed on chronic kidney disease  CKD (chronic kidney disease) stage 3, GFR 30-59 ml/min  Creatinine stable at her baseline Cr 1.3  Avoid nephrotoxic meds     GERD (gastroesophageal reflux disease)  Continue PPI       Anemia of chronic disease  H/H stable, monitor       Depression  Continue effexor       Essential hypertension  Hyperlipidemia   Paroxysmal atrial fibrillation  Chronic anticoagulation  CAD (coronary artery disease)  Chronic diastolic heart failure  Tachy-tejal syndrome s/p pacemaker placement     Continue amiodarone, furosemide and metoprolol. Eliquis held due to EUS today.    Continue statin  Telemetry monitoring.       VTE Risk Mitigation (From admission, onward)         Ordered     Place JENNIFER hose  Until discontinued      02/11/20 8542                      Ishan Lora MD  Department of Hospital Medicine   [facility]

## 2020-02-18 NOTE — PLAN OF CARE
Discharge rounds on patient. Discussed followup appointments, blue discharge folder, discharge nurse will go over home medications and reasons for medications and final discharge instructions. All patient/caregiver questions answered. Patient verbalized understanding.           02/18/20 1533   Final Note   Assessment Type Final Discharge Note   Anticipated Discharge Disposition Home-[facility]   What phone number can be called within the next 1-3 days to see how you are doing after discharge? [phone]   Hospital Follow Up  Appt(s) scheduled? Yes   Discharge plans and expectations educations in teach back method with documentation complete? Yes   Right Care Referral Info   Post Acute Recommendation IRF   Referral Type Home [facility] Name [facility]        02/18/20 1533   Final Note   Assessment Type Final Discharge Note   Anticipated Discharge Disposition Home-Health   What phone number can be called within the next 1-3 days to see how you are doing after discharge? [phone]   Hospital Follow Up  Appt(s) scheduled? Yes   Discharge plans and expectations educations in teach back method with documentation complete? Yes   Right Care Referral Info   Post Acute Recommendation IRF   Referral Type [facility] Name [facility]         Follow-up With  Details  Why  Contact Info   Israel Batista MD  On 2/27/2020  For your hospital follow up at 10:45am.   [address] [facility]  [facility]  [place] [address] [phone]   [facility]    A [facility] nurse will be out to visit you on 2/20/2020. If you have any questions please call [phone]. [address] [id number]

## 2020-02-18 NOTE — PROCEDURES
"Katherine Hernandez is a 88 y.o. female patient.    Temp: 98 °F (36.7 °C) (02/18/20 1144)  Pulse: 60 (02/18/20 1144)  Resp: 20 (02/18/20 1144)  BP: (!) 142/63 (02/18/20 1144)  SpO2: (!) 92 % (02/18/20 0820)  Weight: 75 kg (165 lb 5.5 oz) (02/18/20 0600)  Height: 5' 2" (157.5 cm) (02/17/20 0800)    PICC  Date/Time: 2/18/2020 11:28 AM  Performed by: Garret Gooden RN  Supervising provider: Lamar Zuleta RN  Consent Done: Yes  Time out: Immediately prior to procedure a time out was called to verify the correct patient, procedure, equipment, support staff and site/side marked as required  Indications: med administration and vascular access  Anesthesia: local infiltration  Local anesthetic: lidocaine 1% without epinephrine  Anesthetic Total (mL): 3  Preparation: skin prepped with ChloraPrep  Skin prep agent dried: skin prep agent completely dried prior to procedure  Sterile barriers: all five maximum sterile barriers used - cap, mask, sterile gown, sterile gloves, and large sterile sheet  Hand hygiene: hand hygiene performed prior to central venous catheter insertion  Location details: right basilic  Catheter type: double lumen  Catheter size: 5 Fr  Catheter Length: 39cm    Ultrasound guidance: yes  Vessel Caliber: medium and patent, compressibility normal  Vascular Doppler: not done  Needle advanced into vessel with real time Ultrasound guidance.  Guidewire confirmed in vessel.  Sterile sheath used.  no esophageal manometryNumber of attempts: 1  Post-procedure: blood return through all ports, chlorhexidine patch and sterile dressing applied  Estimated blood loss (mL): 0  Specimens: No  Implants: No  Assessment: placement verified by x-ray  Tip Termination Explanation: see rad. report  Complications: none  Comments: Do not use until placement verified by MD Garret Gooden  2/18/2020  "

## 2020-02-18 NOTE — PROGRESS NOTES
[facility]ious Disease Progress Note     Primary Team:   Consultant Attending: Dr. Crews  Date of Admit: 2/11/2020    Summary of history     89 y/o female with HTN, HLD, CHF, a fib, long term anticoagulant use, CAD, CKD3, anemia of chronic disease, tachy tejal syndrome, Pacemaker placement, chronic neck pain, chronic back pain, depression; [place] [facility] [place] with chest pain, back pain, bilateral shoulder pain, nausea and vomiting X 1. Had dry cough, low grade fever in the ED to 100.2. She was admitted and started on vanco and zosyn empirically. BC on 2/11 one of 2 sets positive for GNR - klebsiella pneumoniae sensitive to all. Flu swab on admit negative; Repeat BC on 2/13 NGTD. UA on admit 2/11 was negative except for trace protein. UA on 2/14 was negative except for trace blood. Portable CXR on 2/11 was negative for acute process. Right knee x ray 2/12 positive for subtle cortical irregularity with nonspecific linear lucency seen involving the medial femoral condyle, degenerative changes and no significant joint effusion seen. CT abdomen and pelvis done on 2/14 positive for bilateral small pleural effusions, s/p cholecystectomy, mild prominence of the common bile duct no definite obstruction, sigmoid diverticulosis, some fractures of L1, L2 and L3 (age indeterminate) remote fracture S2 and right femoral neck. 2/14 the vanco and zosyn were changed to cefazolin. ID consulted 2/15 for help with antibiotics and to figure out source for the klebsiella bacteremia.      2/15 Patient is hard of hearing - daughter in room can provide most information. She became sick all of a sudden - daughter states that patient was in her PCP office earlier this week and was not having any problems. Patient had cholecystectomy years ago. Had right hip surgery and pacemaker placement without problems. Patient complaining of diarrhea. Also of nausea and abdominal pain - epigastric and RUQ pain. No complaints of SOB.      2/16  "still with abdominal pain and nausea    Interval events     In the interim patient remains afebrile with no leukocytosis. She underwent an ERCP today with stone being removed.    Subjective     Patient appears to resting comfortably and has no complaints. She reports that her abdominal pain and nausea have resolved.    Current Medications:     Infusions:       Scheduled:   amiodarone  200 mg Oral Daily    ceFAZolin (ANCEF) IVPB  2 g Intravenous Q12H    furosemide  40 mg Oral Every other day    levothyroxine  100 mcg Oral Before breakfast    metoprolol tartrate  25 mg Oral BID    multivitamin  1 tablet Oral Daily    pantoprazole  40 mg Oral Daily    rosuvastatin  10 mg Oral QHS    senna-docusate 8.6-50 mg  1 tablet Oral QHS    venlafaxine  37.5 mg Oral Daily        PRN:  acetaminophen, albuterol-ipratropium, hydrALAZINE, melatonin, ondansetron, oxyCODONE-acetaminophen, sodium chloride 0.9%, traMADol    Antibiotics and Day Number of Therapy:  Antibiotics (From admission, onward)    Start     Stop Route Frequency Ordered    20 1915  cefazolin (ANCEF) 2 gram in dextrose 5% 50 mL IVPB (premix)      -- IV Every 12 hours (non-standard times) 20 1800          Objective   Last 24 Hour Vital Signs:  BP  Min: 138/60  Max: 187/81  Temp  Av °F (36.7 °C)  Min: 97.6 °F (36.4 °C)  Max: 98.4 °F (36.9 °C)  Pulse  Av.3  Min: 59  Max: 63  Resp  Av.6  Min: 15  Max: 19  SpO2  Av.7 %  Min: 90 %  Max: 100 %  Height  Av' 2" (157.5 cm)  Min: 5' 2" (157.5 cm)  Max: 5' 2" (157.5 cm)  Weight  Av.8 kg (167 lb)  Min: 75.8 kg (167 lb)  Max: 75.8 kg (167 lb)    Lines, drains, airway:  Left hand peripheral IV    Physical Examination:    Examination  General: well appearing, comfortable   HEENT: normal oral mucosa, normal dentition, conjunctiva normal, pupils normal  Cardiac: systolic murmur auscultated, pulse regular    Pulmonary/Chest: chest clear, no respiratory distress   GI/Rectal: no " organomegaly, no masses, non tender, normal bowel sounds, rectal exam deferred   : deferred   Msk: normal motor screening exam  Vascular: normal peripheral perfusion   Lymph nodes: none palpated  Skin/ Extremities: no rash, no pedal edema, no ulceration, ecchymoses on thighs and arms, very thin skin     Neurology/ Mental status: awake and alert      Lab data:  CBC:   Lab Results   Component Value Date    WBC 5.83 02/17/2020    HGB 8.3 (L) 02/17/2020     02/17/2020    MCV 99 (H) 02/17/2020    RDW 15.4 (H) 02/17/2020       Estimated Creatinine Clearance: 28.5 mL/min (based on SCr of 1.3 mg/dL).    Microbiology Data  Microbiology Results (last 7 days)     Procedure Component Value Units Date/Time    Blood culture [[id number]] Collected:  02/13/20 1040    Order Status:  Completed Specimen:  Blood Updated:  02/17/20 1612     Blood Culture, Routine No Growth to date      No Growth to date      No Growth to date      No Growth to date      No Growth to date    Blood culture [[id number]] Collected:  02/13/20 1049    Order Status:  Completed Specimen:  Blood Updated:  02/17/20 1612     Blood Culture, Routine No Growth to date      No Growth to date      No Growth to date      No Growth to date      No Growth to date    Narrative:       Collection has been rescheduled by EML at 02/13/2020 10:42 Reason:   unable to collect second set nurse notified   Collection has been rescheduled by EML at 02/13/2020 10:42 Reason:   unable to collect second set nurse notified     Blood culture x two cultures. Draw prior to antibiotics. [[id number]] Collected:  02/11/20 1946    Order Status:  Completed Specimen:  Blood from Peripheral, Antecubital, Right Updated:  02/16/20 2312     Blood Culture, Routine No growth after 5 days.    Narrative:       Aerobic and anaerobic    Blood culture x two cultures. Draw prior to antibiotics. [[id number]]  (Abnormal)  (Susceptibility) Collected:  02/11/20 1945    Order Status:  Completed Specimen:   Blood from Peripheral, Antecubital, Left Updated:  02/14/20 1445     Blood Culture, Routine Gram stain aer bottle: Gram negative rods       Results called to and read back by: Katelin Ramachandran RN  02/12/2020        11:41      KLEBSIELLA PNEUMONIAE    Narrative:       Aerobic and anaerobic    Influenza A & B by Molecular [[id number]] Collected:  02/11/20 1946    Order Status:  Completed Specimen:  Nasopharyngeal Swab Updated:  02/11/20 2022     Influenza A, Molecular Negative     Influenza B, Molecular Negative     Flu A & B Source Nasal swab          Assessment     87 y/o female with HTN, HLD, CHF, a fib, Carotid artery disease, CKD3, anemia, tachy tejal, s/p pacemaker, chronic neck pain and back pain presented to [facility] 2[place]11 with body aches, nausea vomiting and diarrhea with low grade fever 100.2 in ED. Was admitted and BC on admit positive for klebsiella pneumoniae sensitive to all. Patient was originally on vanco and zosyn and was streamlined to cefazolin on 2/14. Patient with abdominal pain. Ct abdomen and pelvis - s/p cholecystectomy and slight dilation of common bile duct, sigmoid diverticulosis, bilateral small pleural effusions.        Recommendations     Klebsiella pneumoniae bacteremia    Continue cefazolin 2 grams IVPB every 12 h as is for now (cr cl around 24.6)  Recommend continuing this antibiotic until 2/27 to complete a 2 week course from culture clearance  TTE performed today with no suggestion of vegetation   ERCP showed choledocholithiasis which is the likely source of the bacteremia        Thank you for this consult. We will follow.    Blane Cox  [facility] ID Fellow

## 2020-02-18 NOTE — SUBJECTIVE & OBJECTIVE
Interval History: Feeling a little better this AM. Ate more yesterday. Still with some mild abdominal pain. Ready for the procedure today.     Review of Systems   Constitutional: Negative for chills and fever.   Respiratory: Negative for cough and shortness of breath.    Cardiovascular: Negative for chest pain and palpitations.   Gastrointestinal: Positive for nausea. Negative for vomiting.     Objective:     Vital Signs (Most Recent):  Temp: 97.9 °F (36.6 °C) (02/17/20 1540)  Pulse: 60 (02/17/20 1540)  Resp: 18 (02/17/20 1540)  BP: 138/60 (02/17/20 1540)  SpO2: (!) 94 % (02/17/20 1320) Vital Signs (24h Range):  Temp:  [97.6 °F (36.4 °C)-98.4 °F (36.9 °C)] 97.9 °F (36.6 °C)  Pulse:  [59-66] 60  Resp:  [15-19] 18  SpO2:  [90 %-100 %] 94 %  BP: (138-187)/(60-81) 138/60     Weight: 75.8 kg (167 lb)  Body mass index is 30.54 kg/m².    Intake/Output Summary (Last 24 hours) at 2/17/2020 1801  Last data filed at 2/17/2020 1700  Gross per 24 hour   Intake 450 ml   Output 750 ml   Net -300 ml      Physical Exam   Constitutional: She is oriented to person, place, and time. She appears well-developed and well-nourished. No distress.   Cardiovascular: Normal rate and regular rhythm.   Murmur heard.  Pulmonary/Chest: Effort normal and breath sounds normal. No respiratory distress. She has no wheezes.   Abdominal: Soft. Bowel sounds are normal. She exhibits no distension. There is tenderness in the right upper quadrant.   Musculoskeletal: She exhibits no edema.   Neurological: She is alert and oriented to person, place, and time.   Skin: Skin is warm and dry.   Psychiatric: She has a normal mood and affect. Her behavior is normal.   Nursing note and vitals reviewed.      Significant Labs:   CBC:   Recent Labs   Lab 02/16/20  0549 02/17/20  0511   WBC 5.53 5.83   HGB 8.5* 8.3*   HCT 27.3* 26.6*    273     CMP:   Recent Labs   Lab 02/16/20  0549 02/17/20  0511    138   K 2.8* 3.0*   CL 99 100   CO2 29 29   GLU 81 86    BUN 10 8   CREATININE 1.4 1.3   CALCIUM 8.0* 8.0*   PROT 6.0 5.8*   ALBUMIN 2.9* 2.8*   BILITOT 0.3 0.3   ALKPHOS 68 65   AST 36 29   ALT 24 10   ANIONGAP 10 9   EGFRNONAA 34* 37*     Magnesium:   Recent Labs   Lab 02/16/20  0549 02/17/20  0511   MG 2.0 2.1       Significant Imaging: I have reviewed all pertinent imaging results/findings within the past 24 hours.

## 2020-02-18 NOTE — NURSING
md notified that patient is having discomfort in her abd and discomfort from having multiple bm's. CHARLES chaudhari notified

## 2020-02-20 ENCOUNTER — PATIENT OUTREACH (OUTPATIENT)
Dept: ADMINISTRATIVE | Facility: CLINIC | Age: 85
End: 2020-02-20

## 2020-02-20 NOTE — PATIENT INSTRUCTIONS
Sepsis     To treat sepsis, antibiotics and fluids may by given through an intravenous (IV) line.     Sepsis happens when your body responds with widespread inflammation to a bad infection or bacteremia--the presence of bacteria in your bloodstream. Sepsis can be deadly. Blood pressure may drop and the lungs and kidneys may start to fail. Emergency care for sepsis is crucial.  Risk factors  Those most at risk for sepsis are:  · Infants or older adults  · People who have an illness that weakens their immune system, such as cancer, AIDS, or diabetes  · People being treated with chemotherapy medicines or radiation, which weakens the immune system  · People who have had a transplant  · People with a very severe infection such as pneumonia, meningitis, or a urinary tract infection  When to go to the emergency department (ED)  Sepsis is an emergency. Go to the nearest ED if you have a fever with any of these symptoms:  · Chills and shaking  · Rapid heartbeat and breathing  · Trouble breathing  · Severe nausea or uncontrolled vomiting  · Confusion, disorientation, drowsiness, or dizziness  · Decreased urination  · Severe pain, including in the back or joints   What to expect in the ED  · Blood and urine tests are done to look for bacteria. They also check for organ failure.  · Blood, urine, or sputum cultures may be taken. The samples are sent to a lab. They are placed in a special container. Any bacteria should grow in 24 hours.  · X-rays or other imaging tests may be done.  A person with sepsis will be admitted to the hospital and treated with antibiotics. Treatment may also include oxygen and intravenous fluids.  Date Last Reviewed: 10/1/2016  © 1748-4713 [employer]. [address] [place] [address], [place] [address]. All rights reserved. This information is not intended as a substitute for professional medical care. Always follow your healthcare professional's instructions.

## 2020-03-02 ENCOUNTER — EXTERNAL HOME HEALTH (OUTPATIENT)
Dept: HOME HEALTH SERVICES | Facility: HOSPITAL | Age: 85
End: 2020-03-02

## 2020-06-01 ENCOUNTER — DOCUMENT SCAN (OUTPATIENT)
Dept: HOME HEALTH SERVICES | Facility: HOSPITAL | Age: 85
End: 2020-06-01
Payer: MEDICARE

## 2020-12-20 PROBLEM — E86.0 DEHYDRATION: Status: ACTIVE | Noted: 2020-12-20

## 2020-12-20 PROBLEM — A41.4 SEPSIS DUE TO KLEBSIELLA PNEUMONIAE: Status: RESOLVED | Noted: 2020-02-12 | Resolved: 2020-12-20

## 2020-12-20 PROBLEM — R79.89 ELEVATED LFTS: Status: ACTIVE | Noted: 2020-12-20

## 2020-12-22 PROBLEM — N17.9 AKI (ACUTE KIDNEY INJURY): Status: RESOLVED | Noted: 2018-02-02 | Resolved: 2020-12-22

## 2020-12-28 ENCOUNTER — OUTPATIENT CASE MANAGEMENT (OUTPATIENT)
Dept: ADMINISTRATIVE | Facility: OTHER | Age: 85
End: 2020-12-28

## 2020-12-28 NOTE — PROGRESS NOTES
Outpatient Care Management  Initial Patient Assessment    Patient: Katherine Hernandez  MRN: [id number]  Date of Service: 12/28/2020  Completed by: Debo Hughes RN  Referral Date: 12/22/2020  Program: Case Management (High Risk)    Reason for Visit   Patient presents with    OPCM Chart Review    OPCM Enrollment Call    Initial Assessment    Plan Of Care    PHQ-9     12/28/20       Brief Summary: Contacted patient/Cg to complete initial assessment for OPCM. During call, permission received from patient to speak with her daughter Anca Hernandez, about patient's care, agreed to enroll into OPCM program. Referral received following recent inpatient admission (12/20-12/22) for abnormal behavior, A-Fib ONI, UTI. Patient is a 89 y.o. female with a history of obesity, hypertension, coronary artery disease, congestive heart failure,atrial fibrillation, tachy-tejal syndrome status post pacemaker placement on 3/28/18, hyperlipidemia, chronic kidney disease stage 3, anemia, hypothyroidism, GERD, arthritis, depression. Patient lives with her daughter Ms. March who is her primary caregiver in a single story home owned by . Cg reported that pt was previously able to ambulate a few steps on her own but now is very weak, in debilitated state. Pt uses assistive devices to facilitate in her ADL/IADL's (walker, cane, w/c BSC, shower chair) All DME is in order and being used. Cg detailed pt uses a recliner chair that is lifted via remote. Pt tried to lift herself using remote in an attempt to ambulate when pt experienced fall. Pt has cuts, skin tear but no fractures. Cg reported that she assists patient with management of her medications, uses pill box, medication list. Denies any difficulty affording her medications, med rec completed (Cg was aware of medications' name, purpose, dosage, frequency )with some discrepancies noted. PCP BRANDI Moraes is not affiliated with [facility], provider is   With [facility].  Upon  reviewing pt's chronic conditions Cg detailed pt most concerning conditions are CHF and UTI. OCPM reviewed s/s involved with both conditions. The importance of performing interventions to manage conditions were discussed such as checking v/s daily (assess BP, check weight, perform perineal care frequently, check urine for mal odor, sudden or new confusion to pt. Cg acknowledged she does check pt's v/s daily (vs were not taken yet during contact with OPCM) and she makes efforts through out the day to make sure pt's is perineal care is consistent. PHQ2 performed and score of 1 obtained. Patient does not drive and relies on her daughter for transportation. Cg stated she would like any possible support with her mother's care, she is with pt through out the day and has been managing pt's care without much assistance. Cg agreed to pt being referred to OPCM SW to coordinate referrals to MOW (Meals [employer] Wheels and [employer]) for any available services. OCPM also discussed benefits available to pt through [employer]. Cg was aware of most benefits but was not aware of [employer] (Mom's Meals). OPCM suggested using [employer] benefits especially after pt's recent hospital admission.OPCM RN will send educational materials via patient portal to assist with increasing knowledge for CHF and UTI . Cg verbalized understanding, agreed to continued communication with OPCM RN . No Advanced care Planning completed, Cg verbalized pt has living will which is included in pt's chart, does not wish to complete advance directive planning. Encouraged patient to call this RN if having any questions/concerns, contact number provided.      OPC placed call to provider's office [facility] to notify of admission and schedule f/u appt. OPC was advised Cg would need to call to provide email address to confirm appointment. Appointment set up for 12/29 at 10 am OPCM placed call to Cg to inform of appointment and need to call to confirm appoinment. [facility] [place] was  "able to speak with Cg and inform of appointment. Cg agreed to call to confirm appointment.     Assessment Documentation     OPCM Initial Assessment    Involvement of Care  Do I have permission to speak with other family members about your care?: Yes (Comment: Anca Henrandez )  Assessment completed by: Children  Identified Areas of Need  Advanced Care Planning: No  Housing: no  Medication Adherence: No  *Active medication list was reviewed and reconciled with patient and/or caregiver:   Nutrition: no  Lab Adherence: no  Depression: No  Cognitive/Behavioral Health: yes (Comment: had UTI - "cursing ppl", did not want to cooperate with hospital staff )  Communication: no  Health Literacy: no  Fall risk?: No  Equipment/Supplies/Services: no          Problem List and History     Problems Addressed This Visit    Atrial Fibrillation: Not identified by patient as current problem (Comment: has pacemaker in place )  Depression: Not identified by patient as current problem  Heart Failure: Identified as current problem  Hypertension: Not identified by patient as current problem  Anemia: Not identified by patient as current problem  Chronic Kidney Disease: Not identified by patient as current problem  Hyperlipidemia: Not identified by patient as current problem  UTI: Identified as current problem  Heart Disease: Not identified by patient as current problem         Reviewed medical and social history with patient and/or caregiver. A complex care plan was discussed and completed today, with input from patient and/or caregiver.    Patient Instructions     Instructions were provided via the [employer] patient resources and are available for the patient to view on the patient portal, if active.  Patient Instructions     Bladder Infection, Female (Adult)    Urine is normally doesn't have any bacteria in it. But bacteria can get into the urinary tract from the skin around the rectum. Or they can travel in the blood from elsewhere in the " "body. Once they are in your urinary tract, they can cause infection in the urethra (urethritis), the bladder (cystitis), or the kidneys (pyelonephritis).  The most common place for an infection is in the bladder. This is called a bladder infection. This is one of the most common infections in women. Most bladder infections are easily treated. They are not serious unless the infection spreads to the kidney.  The phrases "bladder infection," "UTI," and "cystitis" are often used to describe the same thing. But they are not always the same. Cystitis is an inflammation of the bladder. The most common cause of cystitis is an infection.  Symptoms  The infection causes inflammation in the urethra and bladder. This causes many of the symptoms. The most common symptoms of a bladder infection are:  · Pain or burning when urinating  · Having to urinate more often than usual  · Urgent need to urinate  · Only a small amount of urine comes out  · Blood in urine  · Abdominal discomfort. This is usually in the lower abdomen above the pubic bone.  · Cloudy urine  · Strong- or bad-smelling urine  · Unable to urinate (urinary retention)  · Unable to hold urine in (urinary incontinence)  · Fever  · Loss of appetite  · Confusion (in older adults)  Causes  Bladder infections are not contagious. You can't get one from someone else, from a toilet seat, or from sharing a bath.  The most common cause of bladder infections is bacteria from the bowels. The bacteria get onto the skin around the opening of the urethra. From there, they can get into the urine and travel up to the bladder, causing inflammation and infection. This usually happens because of:  · Wiping improperly after urinating. Always wipe from front to back.  · Bowel incontinence  · Pregnancy  · Procedures such as having a catheter inserted  · Older age  · Not emptying your bladder. This can allow bacteria a chance to grow in your " urine.  · Dehydration  · Constipation  · Sex  · Use of a diaphragm for birth control   Treatment  Bladder infections are diagnosed by a urine test. They are treated with antibiotics and usually clear up quickly without complications. Treatment helps prevent a more serious kidney infection.  Medicines  Medicines can help in the treatment of a bladder infection:  · Take antibiotics until they are used up, even if you feel better. It is important to finish them to make sure the infection has cleared.  · You can use acetaminophen or ibuprofen for pain, fever, or discomfort, unless another medicine was prescribed. If you have chronic liver or kidney disease, talk with your healthcare provider before using these medicines. Also talk with your provider if you've ever had a stomach ulcer or gastrointestinal bleeding, or are taking blood-thinner medicines.  · If you are given phenazopydridine to reduce burning with urination, it will cause your urine to become a bright orange color. This can stain clothing.  Care and prevention  These self-care steps can help prevent future infections:  · Drink plenty of fluids to prevent dehydration and flush out your bladder. Do this unless you must restrict fluids for other health reasons, or your doctor told you not to.  · Proper cleaning after going to the bathroom is important. Wipe from front to back after using the toilet to prevent the spread of bacteria.  · Urinate more often. Don't try to hold urine in for a long time.  · Wear loose-fitting clothes and cotton underwear. Avoid tight-fitting pants.  · Improve your diet and prevent constipation. Eat more fresh fruit and vegetables, and fiber, and less junk and fatty foods.  · Avoid sex until your symptoms are gone.  · Avoid caffeine, alcohol, and spicy foods. These can irritate your bladder.  · Urinate right after intercourse to flush out your bladder.  · If you use birth control pills and have frequent bladder infections, discuss it  with your doctor.  Follow-up care  Call your healthcare provider if all symptoms are not gone after 3 days of treatment. This is especially important if you have repeat infections.  If a culture was done, you will be told if your treatment needs to be changed. If directed, you can call to find out the results.  If X-rays were done, you will be told if the results will affect your treatment.  Call 911  Call 911 if any of the following occur:  · Trouble breathing  · Hard to wake up or confusion  · Fainting or loss of consciousness  · Rapid heart rate  When to seek medical advice  Call your healthcare provider right away if any of these occur:  · Fever of 100.4ºF (38.0ºC) or higher, or as directed by your healthcare provider  · Symptoms are not better by the third day of treatment  · Back or belly (abdominal) pain that gets worse  · Repeated vomiting, or unable to keep medicine down  · Weakness or dizziness  · Vaginal discharge  · Pain, redness, or swelling in the outer vaginal area (labia)  Date Last Reviewed: 10/1/2016  © 9555-7599 [employer]. [address] [place] [address]. All rights reserved. This information is not intended as a substitute for professional medical care. Always follow your healthcare professional's instructions.            Home Care Guide for Heart Failure Patients     Next Steps:   Confirm receipt of resource materials  Collaborate with OPCM LMSW  F/U on message to PCP (med discrepancies and f/u visit after hospital admission)  Review BP readings  Continue education on CHF and UTI prevention    Follow up in about 11 days (around 1/8/2021) for RN follow up.    Todays OPCM Self-Management Care Plan was developed with the patients/caregivers input and was based on identified barriers from todays assessment.  Goals were written today with the patient/caregiver and the patient has agreed to work towards these goals to improve his/her overall well-being. Patient verbalized  understanding of the care plan, goals, and all of today's instructions. Encouraged patient/caregiver to communicate with his/her physician and health care team about health conditions and the treatment plan.  Provided my contact information today and encouraged patient/caregiver to call me with any questions as needed.

## 2020-12-28 NOTE — PATIENT INSTRUCTIONS

## 2021-01-08 ENCOUNTER — OUTPATIENT CASE MANAGEMENT (OUTPATIENT)
Dept: ADMINISTRATIVE | Facility: OTHER | Age: 86
End: 2021-01-08

## 2022-05-18 NOTE — ASSESSMENT & PLAN NOTE
Patient's blood pressure is poorly-controlled; will continue home regimen of losartan, and provide as-needed clonidine.  Will avoid AV-arturo blockers.   done

## 2023-11-30 NOTE — TELEPHONE ENCOUNTER
A generator pocket was made at the left chest with electrocautery. Notify home health that I called in proventil inhaler which pt. Can use prn; if her respiratory symptoms are severe or become severe, pt. Will need to proceed to ER; typically, home health evaluates wounds and sends me orders based on evaluation. Rx sent to [facility].